# Patient Record
Sex: MALE | Race: WHITE | NOT HISPANIC OR LATINO | Employment: OTHER | URBAN - METROPOLITAN AREA
[De-identification: names, ages, dates, MRNs, and addresses within clinical notes are randomized per-mention and may not be internally consistent; named-entity substitution may affect disease eponyms.]

---

## 2018-08-08 PROBLEM — E87.1 HYPONATREMIA: Status: ACTIVE | Noted: 2018-08-08

## 2018-08-08 PROBLEM — R56.9 OBSERVED SEIZURE-LIKE ACTIVITY: Status: ACTIVE | Noted: 2018-08-08

## 2018-08-08 PROBLEM — G93.89 RIGHT FRONTAL LOBE MASS: Status: ACTIVE | Noted: 2018-08-08

## 2018-08-08 PROBLEM — Z72.0 TOBACCO USE: Status: ACTIVE | Noted: 2018-08-08

## 2018-08-11 PROBLEM — R55 SYNCOPE: Status: ACTIVE | Noted: 2018-08-11

## 2018-08-11 PROBLEM — R56.9 SEIZURE: Status: ACTIVE | Noted: 2018-08-11

## 2018-08-13 PROBLEM — E87.1 HYPONATREMIA: Status: RESOLVED | Noted: 2018-08-08 | Resolved: 2018-08-13

## 2018-08-13 PROBLEM — R55 SYNCOPE: Status: RESOLVED | Noted: 2018-08-11 | Resolved: 2018-08-13

## 2018-08-22 ENCOUNTER — TELEPHONE (OUTPATIENT)
Dept: NEUROSURGERY | Facility: CLINIC | Age: 33
End: 2018-08-22

## 2018-08-22 ENCOUNTER — CLINICAL SUPPORT (OUTPATIENT)
Dept: NEUROSURGERY | Facility: CLINIC | Age: 33
End: 2018-08-22
Payer: MEDICAID

## 2018-08-22 DIAGNOSIS — D49.7 NEOPLASM OF CENTRAL NERVOUS SYSTEM: ICD-10-CM

## 2018-08-22 NOTE — PROGRESS NOTES
Pt is two weeks s/p craniotomy with Dr. Paulson. No s/s of infection. Incision is warm, dry, and intact. Pt reports no post operative pain. Pt is not requesting pain medication refill. Educated patient on weight lifting status, bending/lifting/twisting, and to call with any changes or questions. Pt aware of imaging and follow up appt. with provider. No further questions.

## 2018-08-22 NOTE — TELEPHONE ENCOUNTER
This pt came in for a wound check but he wasn't scheduled for a 4 week follow up or anything. I scheduled him for a 4 week post op with a synaptive MRI. Just wanted to make sure that is all he needs for his follow up appts. Thanks!!

## 2018-09-19 ENCOUNTER — OFFICE VISIT (OUTPATIENT)
Dept: NEUROSURGERY | Facility: CLINIC | Age: 33
End: 2018-09-19
Payer: MEDICAID

## 2018-09-19 VITALS
DIASTOLIC BLOOD PRESSURE: 79 MMHG | BODY MASS INDEX: 21.52 KG/M2 | SYSTOLIC BLOOD PRESSURE: 118 MMHG | WEIGHT: 133.94 LBS | HEART RATE: 66 BPM | HEIGHT: 66 IN

## 2018-09-19 DIAGNOSIS — C71.9 LOW GRADE GLIOMA OF BRAIN: ICD-10-CM

## 2018-09-19 PROCEDURE — 99213 OFFICE O/P EST LOW 20 MIN: CPT | Mod: PBBFAC,PN | Performed by: PHYSICIAN ASSISTANT

## 2018-09-19 PROCEDURE — 99999 PR PBB SHADOW E&M-EST. PATIENT-LVL III: CPT | Mod: PBBFAC,,, | Performed by: PHYSICIAN ASSISTANT

## 2018-09-19 PROCEDURE — 99024 POSTOP FOLLOW-UP VISIT: CPT | Mod: ,,, | Performed by: PHYSICIAN ASSISTANT

## 2018-09-19 NOTE — PROGRESS NOTES
Neurosurgery History & Physical    Patient ID: Дмитрий Winn is a 33 y.o. male.    Chief Complaint   Patient presents with    Post-op Evaluation     4 week post op craniotomy neoplam excision; pt states feels 100% better; no pain       Review of Systems   Constitutional: Negative for chills, diaphoresis, fatigue and fever.   HENT: Negative for congestion, hearing loss, rhinorrhea and sore throat.    Eyes: Negative for photophobia, pain, redness and visual disturbance.   Respiratory: Negative for cough, chest tightness, shortness of breath and wheezing.    Cardiovascular: Negative for chest pain, palpitations and leg swelling.   Gastrointestinal: Negative for abdominal distention, abdominal pain, constipation, diarrhea, nausea and vomiting.   Genitourinary: Negative for difficulty urinating, dysuria, frequency, hematuria and urgency.   Musculoskeletal: Negative for back pain, gait problem, myalgias, neck pain and neck stiffness.   Skin: Negative for pallor and rash.   Neurological: Negative for dizziness, seizures, speech difficulty, weakness, light-headedness, numbness and headaches.   Psychiatric/Behavioral: Negative for confusion, hallucinations and sleep disturbance.       History reviewed. No pertinent past medical history.  Social History     Socioeconomic History    Marital status: Single     Spouse name: Not on file    Number of children: Not on file    Years of education: Not on file    Highest education level: Not on file   Social Needs    Financial resource strain: Not on file    Food insecurity - worry: Not on file    Food insecurity - inability: Not on file    Transportation needs - medical: Not on file    Transportation needs - non-medical: Not on file   Occupational History    Not on file   Tobacco Use    Smoking status: Current Every Day Smoker     Packs/day: 1.00     Years: 8.00     Pack years: 8.00     Types: Cigarettes     Start date: 2010   Substance and Sexual Activity     "Alcohol use: Yes     Alcohol/week: 15.0 oz     Types: 25 Cans of beer per week    Drug use: No    Sexual activity: Not on file   Other Topics Concern    Not on file   Social History Narrative    Not on file     History reviewed. No pertinent family history.  Review of patient's allergies indicates:  No Known Allergies    Current Outpatient Medications:     levETIRAcetam (KEPPRA) 500 MG Tab, Take 1 tablet (500 mg total) by mouth 2 (two) times daily., Disp: 60 tablet, Rfl: 1  No current facility-administered medications for this visit.   Blood pressure 118/79, pulse 66, height 5' 6" (1.676 m), weight 60.7 kg (133 lb 14.9 oz).      Neurologic Exam     Mental Status   Oriented to person, place, and time.   Oriented to person.   Oriented to place.   Oriented to time.   Follows 3 step commands.   Attention: normal. Concentration: normal.   Speech: speech is normal   Level of consciousness: alert  Knowledge: consistent with education.   Able to name object. Able to read. Able to repeat. Able to write. Normal comprehension.      Cranial Nerves      CN II   Visual acuity: normal  Right visual field deficit: none  Left visual field deficit: none      CN III, IV, VI   Pupils are equal, round, and reactive to light.  Right pupil: Size: 3 mm. Shape: regular. Reactivity: brisk. Consensual response: intact.   Left pupil: Size: 3 mm. Shape: regular. Reactivity: brisk. Consensual response: intact.   CN III: no CN III palsy  CN VI: no CN VI palsy  Nystagmus: none   Diplopia: none  Ophthalmoparesis: none  Conjugate gaze: present     CN V   Right facial sensation deficit: none  Left facial sensation deficit: none     CN VII   Right facial weakness: none  Left facial weakness: none     CN VIII   Hearing: intact     CN IX, X   CN IX normal.   CN X normal.      CN XI   Right sternocleidomastoid strength: normal  Left sternocleidomastoid strength: normal  Right trapezius strength: normal  Left trapezius strength: normal     CN XII "   Fasciculations: absent  Tongue deviation: none     Motor Exam   Muscle bulk: normal  Overall muscle tone: normal  Right arm pronator drift: absent  Left arm pronator drift: absent     Strength   Right deltoid: 5/5  Left deltoid: 5/5  Right biceps: 5/5  Left biceps: 5/5  Right triceps: 5/5  Left triceps: 5/5  Right wrist flexion: 5/5  Left wrist flexion: 5/5  Right wrist extension: 5/5  Left wrist extension: 5/5  Right interossei: 5/5  Left interossei: 5/5  Right iliopsoas: 5/5  Left iliopsoas: 5/5  Right quadriceps: 5/5  Left quadriceps: 5/5  Right hamstrin/5  Left hamstrin/5  Right anterior tibial: 5/5  Left anterior tibial: 5/5  Right posterior tibial: 5/5  Left posterior tibial: 5/5  Right peroneal: 5/5  Left peroneal: 5/5  Right gastroc: 5/5  Left gastroc: 5/5     Sensory Exam   Right arm light touch: normal  Left arm light touch: normal  Right leg light touch: normal  Left leg light touch: normal     Gait, Coordination, and Reflexes      Gait  Gait: normal      Coordination   Romberg: negative  Finger to nose coordination: normal  Heel to shin coordination: normal  Tandem walking coordination: normal     Tremor   Resting tremor: absent  Intention tremor: absent  Action tremor: absent     Reflexes   Right brachioradialis: 2+  Left brachioradialis: 2+  Right biceps: 2+  Left biceps: 2+  Right triceps: 2+  Left triceps: 2+  Right patellar: 2+  Left patellar: 2+  Right achilles: 2+  Left achilles: 2+  Right Freeman: absent  Left Freeman: absent  Right ankle clonus: absent  Left ankle clonus: absent      Physical Exam  Constitutional: Oriented to person, place, and time. Appears well-developed and well-nourished.   HENT:   Head: Normocephalic and atraumatic.   Eyes: Pupils are equal, round, and reactive to light.   Neck: Normal range of motion. Neck supple.   Cardiovascular: Normal rate.    Pulmonary/Chest: Effort normal.   Musculoskeletal: Normal range of motion. Exhibits no edema.   Neurological: Alert  and oriented to person, place, and time. Normal Finger-Nose-Finger Test, a normal Heel to Shin Test, a normal Romberg Test and a normal Tandem Gait Test. Gait normal.   Reflex Scores:       Tricep reflexes are 2+ on the right side and 2+ on the left side.       Bicep reflexes are 2+ on the right side and 2+ on the left side.       Brachioradialis reflexes are 2+ on the right side and 2+ on the left side.       Patellar reflexes are 2+ on the right side and 2+ on the left side.       Achilles reflexes are 2+ on the right side and 2+ on the left side.  Skin: Skin is warm, dry and intact.   Psychiatric: Normal mood and affect. Speech is normal and behavior is normal. Judgment and thought content normal.   Nursing note and vitals reviewed.      Provider dictation:    Дмитрий Wnin is a 33 year-old right-handed  male with normal health and development who presented to the ED via EMS following a seizure. Neuroimaging showed a large frontal mass with an unusual appearance suspicious for low grade glioma or neuroglial cyst. He was admitted for craniotomy and excisional biopsy during that hospitalization. He presents today for neurosurgical follow-up s/p right supraorbital frontal craniotomy and resection of gyrus rectus tumor on 8/10/2018. Patient denies any seizures since presentation and is currently maintained on Levetiracetam 500 mg BID. He denies any headaches, weakness, or vision changes.     On exam patient is neurologically intact. Incision is well healed.     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    MRI brain synaptive personally reviewed and shows no enhancement or tumor recurrence.     Дмитрий Winn is recovering well from surgery. Patient is currently uninsured and medicaid pending. We will refer him to [facility] oncology clinic for further evaluation and treatment. Patient will not require immediate  chemo/radiation, but we need him to establish care with oncology. Since patient has been seizure free he is clear to stop the Levetiracetam at this point and drive. He is also clear to resume normal activity and return to work from our stand point. Patient and imaging reviewed with Dr. Paulson. Patient was informed to call the clinic with any further questions or concerns.     1. Low grade glioma of brain  Ambulatory consult to Hematology / Oncology

## 2018-09-25 ENCOUNTER — TELEPHONE (OUTPATIENT)
Dept: NEUROLOGY | Facility: HOSPITAL | Age: 33
End: 2018-09-25

## 2018-09-25 NOTE — TELEPHONE ENCOUNTER
----- Message from Samantha Mo sent at 9/21/2018  3:27 PM CDT -----  Regarding: NEW  Contact: [phone]  NEW-  - Who called: Referral faxed from Jennifer Cabrera    Referred by: Dr Moreno    Why do you need to be seen?  Low grade glioma of brain    Which doctor are you requesting?     You may contact patient back to schedule at: [phone]

## 2018-09-25 NOTE — TELEPHONE ENCOUNTER
msg received, pt needs neuro oncology at [facility] or [facility]. Sent msg to Dr. Lynch office informing them of this.

## 2018-09-26 ENCOUNTER — TELEPHONE (OUTPATIENT)
Dept: NEUROSURGERY | Facility: CLINIC | Age: 33
End: 2018-09-26

## 2018-09-26 NOTE — TELEPHONE ENCOUNTER
----- Message from Cielo Betancourt RN sent at 9/25/2018  4:49 PM CDT -----  We received a call referring this patient to the neuroendcrine tumor clinic at [facility].  He would have to be referred to oncology at [facility] or [facility]. We do not see this type of patient in our clinic.

## 2018-11-01 ENCOUNTER — INITIAL CONSULT (OUTPATIENT)
Dept: HEMATOLOGY/ONCOLOGY | Facility: CLINIC | Age: 33
End: 2018-11-01
Payer: MEDICAID

## 2018-11-01 VITALS
TEMPERATURE: 98 F | BODY MASS INDEX: 22 KG/M2 | WEIGHT: 136.88 LBS | HEIGHT: 66 IN | SYSTOLIC BLOOD PRESSURE: 124 MMHG | HEART RATE: 56 BPM | DIASTOLIC BLOOD PRESSURE: 74 MMHG

## 2018-11-01 DIAGNOSIS — C71.9 ASTROCYTOMA BRAIN TUMOR: Primary | ICD-10-CM

## 2018-11-01 DIAGNOSIS — G93.89 RIGHT FRONTAL LOBE MASS: Primary | ICD-10-CM

## 2018-11-01 DIAGNOSIS — R56.9 SEIZURE: ICD-10-CM

## 2018-11-01 PROCEDURE — 99205 OFFICE O/P NEW HI 60 MIN: CPT | Mod: S$PBB,,, | Performed by: INTERNAL MEDICINE

## 2018-11-01 PROCEDURE — 99999 PR PBB SHADOW E&M-EST. PATIENT-LVL III: CPT | Mod: PBBFAC,,, | Performed by: INTERNAL MEDICINE

## 2018-11-01 PROCEDURE — 99213 OFFICE O/P EST LOW 20 MIN: CPT | Mod: PBBFAC | Performed by: INTERNAL MEDICINE

## 2018-11-01 NOTE — PROGRESS NOTES
Hematology and Medical Oncology   New Patient Consult     11/01/2018  Referred by:  Shoshana Moreno    Primary Oncologic Diagnosis: Grade II Glioma     History of Present Ilness:   Дмитрий Winn (Дмитрий) is a pleasant 33 y.o.male who presents today to discuss long term follow up plans for his low grade glioma. He is doing well 3 months post op with no side effects, complications or deficits.      Oncology History:  --On August 8, 2018 he presented to [facility].  presented to the ED via EMS following a seizure. Neuroimaging showed a large frontal mass with an unusual appearance suspicious for low grade glioma or neuroglial cyst.   --CT on 8/8/18 INTRACRANIAL: 4.8 x 3.5 x 3.6 cm  (AP x TR x CC) relatively homogeneous hypoattenuating air lesion favored to be intra-axial within the inferior right frontal lobe.  Though there does appear to be surface which is partially abuts the floor of the anterior cranial fossa.  Trace leftward anterior midline bowing.  Minimal mass effect on the anterior horn of the right lateral ventricle.  Basal cisterns are patent.  No acute intracranial hemorrhage.  No hydrocephalus.  --MRI on 8/8/18 Diffusion-weighted imaging is negative for acute or subacute ischemia.  There is right frontal lobe lesion appears likely intraparenchymal less likely dural arising from the floor of the anterior cranial fossa.  This measures 4.6 x 3.5 cm transverse by 3.8 cm craniocaudad.  This is T1 hypointense, T2 hyperintense with some mild heterogeneity within.  No enhancement is appreciated.  There is mild associated vasogenic edema.  There is mild right to left midline shift anterior falcine level of approximately 3 mm.  -- right supraorbital frontal craniotomy and resection of gyrus rectus tumor on 8/10/2018. 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   Expected postoperative changes.  Signal along the inferior margin of the resection cavity in the gyrus rectus and orbital gyrus is similar to preoperative study.        PAST MEDICAL HISTORY:   History reviewed. No pertinent past medical history.    PAST SURGICAL HISTORY:   Past Surgical History:   Procedure Laterality Date    CRANIOTOMY FOR EXCISION OF INTRACRANIAL TUMOR Right 8/10/2018    Procedure: CRANIOTOMY, FOR INTRACRANIAL NEOPLASM EXCISION Right eyebrow incision / LAY[facility] [employer] craniotomy for tumor resection;  Surgeon: Lefty Paulson MD;  Location: [facility];  Service: Neurosurgery;  Laterality: Right;    CRANIOTOMY, FOR INTRACRANIAL NEOPLASM EXCISION Right eyebrow incision / LAYNE [employer] craniotomy for tumor resection Right 8/10/2018    Performed by Lefty [facility]son MD at [facility] OR       PAST SOCIAL HISTORY:   reports that he has been smoking cigarettes.  He started smoking about 8 years ago. He has a 8.00 pack-year smoking history. He does not have any smokeless tobacco history on file. He reports that he drinks about 15.0 oz of alcohol per week. He reports that he does not use drugs.    FAMILY HISTORY:  Family History   Problem Relation Age of Onset    Cancer Maternal Grandfather     Prostate cancer Maternal Grandfather     Cancer Paternal Grandmother     Breast cancer Paternal Grandmother        CURRENT MEDICATIONS:   No current outpatient medications on file.     No current facility-administered medications for this visit.      ALLERGIES:   Review of patient's allergies indicates:  No Known Allergies      Review of Systems:     Review of Systems   Constitutional: Negative for appetite change, chills, diaphoresis, fatigue, fever and unexpected weight change.   HENT:   Negative for hearing loss, mouth sores, nosebleeds, sore throat, trouble swallowing and voice change.    Eyes: Negative for eye problems and icterus.   Respiratory: Negative for chest tightness, cough, hemoptysis, shortness of  breath and wheezing.    Cardiovascular: Negative for chest pain, leg swelling and palpitations.   Gastrointestinal: Negative for abdominal distention, abdominal pain, blood in stool, diarrhea, nausea and vomiting.   Endocrine: Negative for hot flashes.   Genitourinary: Negative for bladder incontinence, difficulty urinating, dysuria and hematuria.    Musculoskeletal: Negative for arthralgias, back pain, flank pain, gait problem, myalgias, neck pain and neck stiffness.   Skin: Negative for itching, rash and wound.   Neurological: Negative for dizziness, extremity weakness, gait problem, headaches, numbness, seizures and speech difficulty.   Hematological: Negative for adenopathy. Does not bruise/bleed easily.   Psychiatric/Behavioral: Negative for confusion, depression and sleep disturbance. The patient is not nervous/anxious.           Physical Exam:     Vitals:    11/01/18 0852   BP: 124/74   Pulse: (!) 56   Temp: 97.7 °F (36.5 °C)     Physical Exam   Constitutional: He is oriented to person, place, and time. He appears well-developed and well-nourished. No distress.   HENT:   Head: Normocephalic and atraumatic.   Mouth/Throat: Oropharynx is clear and moist. No oropharyngeal exudate.   Well healed right forehead incision    Eyes: Conjunctivae and EOM are normal. Pupils are equal, round, and reactive to light.   Neck: Normal range of motion. Neck supple. No JVD present. No tracheal deviation present. No thyromegaly present.   Cardiovascular: Normal rate, regular rhythm and normal heart sounds. Exam reveals no friction rub.   No murmur heard.  Pulmonary/Chest: Effort normal and breath sounds normal. No stridor. No respiratory distress. He has no wheezes. He has no rales. He exhibits no tenderness.   Abdominal: Soft. Bowel sounds are normal. He exhibits no distension. There is no tenderness. There is no rebound and no guarding.   Musculoskeletal: Normal range of motion. He exhibits no edema, tenderness or deformity.    Lymphadenopathy:     He has no axillary adenopathy.   Neurological: He is alert and oriented to person, place, and time. He displays normal reflexes. No cranial nerve deficit or sensory deficit. He exhibits normal muscle tone. Coordination normal.   Skin: Skin is warm and dry. Capillary refill takes less than 2 seconds. No rash noted. He is not diaphoretic. No erythema. No pallor.   Psychiatric: He has a normal mood and affect. His behavior is normal. Judgment and thought content normal.       ECOG Performance Status: (foot note - ECOG PS provided by Eastern Cooperative Oncology Group) 0 - Asymptomatic    Karnofsky Performance Score:  100%- Normal, No Complaints, No Evidence of Disease    Labs:   Lab Results   Component Value Date    WBC 20.02 (H) 08/11/2018    HGB 13.5 (L) 08/11/2018    HCT 40.2 08/11/2018     08/11/2018    ALT 33 08/08/2018    AST 27 08/08/2018     08/11/2018    K 3.9 08/11/2018     08/11/2018    CREATININE 0.61 08/11/2018    BUN 11 08/11/2018    CO2 23 08/11/2018    INR 1.0 08/09/2018         Imaging:Previous imaging has been reviewed   Assessment and Plan:     Mr. Winn is a 33 year old male who presents today for surveillance planning of his Grade II Glioma.      Astrocytoma  --WHO Grade II that was resected on 8/10/18  --Will do q3-6 month brain MRI's as active surveillance, the next scan is planned for January 2019  --No current indication for therapy        60 minutes were spent face to face with the patient and his  family to discuss the disease, natural history, treatment options and survival statistics. I have provided the patient with an opportunity to ask questions and have all questions answered to his satisfaction.       he will return to clinic in 2 months, but knows to call in the interim if symptoms change or should a problem arise.        Dasha Coronado MD  Hematology and Medical Oncology  Bone Marrow Transplant  [facility]

## 2018-11-01 NOTE — LETTER
November 1, 2018      Shoshana Moreno PA-C  [address] [facility] [address] [place] [address]           [facility] Hematology Oncology  [address]  [place] [address]  Phone: [phone]          Patient: Дмитрий Winn   MR Number: [id number]   YOB: 1985   Date of Visit: 11/1/2018       Dear Shoshana Moreno:    Thank you for referring Дмитрий Winn to me for evaluation. Attached you will find relevant portions of my assessment and plan of care.    If you have questions, please do not hesitate to call me. I look forward to following Дмитрий Winn along with you.    Sincerely,    Dasha Coronado MD    Enclosure  CC:  No Recipients    If you would like to receive this communication electronically, please contact [email or web address] or [phone] to request more information on [employer] access.    For providers and/or their staff who would like to refer a patient to [facility], please contact us through our one-stop-shop provider referral line, [facility] , at [phone].    If you feel you have received this communication in error or would no longer like to receive these types of communications, please e-mail [email or web address]

## 2019-01-11 ENCOUNTER — HOSPITAL ENCOUNTER (OUTPATIENT)
Dept: RADIOLOGY | Facility: HOSPITAL | Age: 34
Discharge: HOME | End: 2019-01-11
Attending: INTERNAL MEDICINE
Payer: MEDICAID

## 2019-01-11 DIAGNOSIS — C71.9 ASTROCYTOMA BRAIN TUMOR: ICD-10-CM

## 2019-01-11 PROCEDURE — 70553 MRI BRAIN W WO CONTRAST: ICD-10-PCS | Mod: 26,,, | Performed by: RADIOLOGY

## 2019-01-11 PROCEDURE — 25500020 PHARM REV CODE 255: Performed by: INTERNAL MEDICINE

## 2019-01-11 PROCEDURE — 70553 MRI BRAIN STEM W/O & W/DYE: CPT | Mod: TC

## 2019-01-11 PROCEDURE — A9585 GADOBUTROL INJECTION: HCPCS | Performed by: INTERNAL MEDICINE

## 2019-01-11 PROCEDURE — 70553 MRI BRAIN STEM W/O & W/DYE: CPT | Mod: 26,,, | Performed by: RADIOLOGY

## 2019-01-11 RX ORDER — GADOBUTROL 604.72 MG/ML
7 INJECTION INTRAVENOUS
Status: COMPLETED | OUTPATIENT
Start: 2019-01-11 | End: 2019-01-11

## 2019-01-11 RX ADMIN — GADOBUTROL 7 ML: 604.72 INJECTION INTRAVENOUS at 02:01

## 2019-01-14 ENCOUNTER — OFFICE VISIT (OUTPATIENT)
Dept: HEMATOLOGY/ONCOLOGY | Facility: CLINIC | Age: 34
End: 2019-01-14
Payer: MEDICAID

## 2019-01-14 VITALS
WEIGHT: 139.13 LBS | HEIGHT: 66 IN | TEMPERATURE: 98 F | DIASTOLIC BLOOD PRESSURE: 81 MMHG | BODY MASS INDEX: 22.36 KG/M2 | SYSTOLIC BLOOD PRESSURE: 135 MMHG | HEART RATE: 56 BPM | RESPIRATION RATE: 18 BRPM

## 2019-01-14 DIAGNOSIS — C71.9 ASTROCYTOMA BRAIN TUMOR: Primary | ICD-10-CM

## 2019-01-14 PROCEDURE — 99215 OFFICE O/P EST HI 40 MIN: CPT | Mod: S$PBB,,, | Performed by: INTERNAL MEDICINE

## 2019-01-14 PROCEDURE — 99215 PR OFFICE/OUTPT VISIT, EST, LEVL V, 40-54 MIN: ICD-10-PCS | Mod: S$PBB,,, | Performed by: INTERNAL MEDICINE

## 2019-01-14 PROCEDURE — 99213 OFFICE O/P EST LOW 20 MIN: CPT | Mod: PBBFAC | Performed by: INTERNAL MEDICINE

## 2019-01-14 PROCEDURE — 99999 PR PBB SHADOW E&M-EST. PATIENT-LVL III: CPT | Mod: PBBFAC,,, | Performed by: INTERNAL MEDICINE

## 2019-01-14 PROCEDURE — 99999 PR PBB SHADOW E&M-EST. PATIENT-LVL III: ICD-10-PCS | Mod: PBBFAC,,, | Performed by: INTERNAL MEDICINE

## 2019-01-14 NOTE — PROGRESS NOTES
Hematology and Medical Oncology   Follow Up     01/14/2019    Primary Oncologic Diagnosis: Grade II Glioma     History of Present Ilness:   Дмитрий Winn (Дмитрий) is a pleasant 33 y.o.male who presents today to discuss long term follow up plans for his low grade glioma. He is doing well 6 months post op with no side effects, complications or deficits.      Oncology History:  --On August 8, 2018 he presented to [facility].  presented to the ED via EMS following a seizure. Neuroimaging showed a large frontal mass with an unusual appearance suspicious for low grade glioma or neuroglial cyst.   --CT on 8/8/18 INTRACRANIAL: 4.8 x 3.5 x 3.6 cm  (AP x TR x CC) relatively homogeneous hypoattenuating air lesion favored to be intra-axial within the inferior right frontal lobe.  Though there does appear to be surface which is partially abuts the floor of the anterior cranial fossa.  Trace leftward anterior midline bowing.  Minimal mass effect on the anterior horn of the right lateral ventricle.  Basal cisterns are patent.  No acute intracranial hemorrhage.  No hydrocephalus.  --MRI on 8/8/18 Diffusion-weighted imaging is negative for acute or subacute ischemia.  There is right frontal lobe lesion appears likely intraparenchymal less likely dural arising from the floor of the anterior cranial fossa.  This measures 4.6 x 3.5 cm transverse by 3.8 cm craniocaudad.  This is T1 hypointense, T2 hyperintense with some mild heterogeneity within.  No enhancement is appreciated.  There is mild associated vasogenic edema.  There is mild right to left midline shift anterior falcine level of approximately 3 mm.  -- right supraorbital frontal craniotomy and resection of gyrus rectus tumor on 8/10/2018. 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  Expected postoperative changes.  Signal along  the inferior margin of the resection cavity in the gyrus rectus and orbital gyrus is similar to preoperative study.  --MRI on 1/11/19 Lesion best viewed on the this measures 3.7 x 2.6 cm in maximal transverse dimension on today's study and over 2.6 cm in craniocaudad extent (compared to 3.5 x 2.2 x 2.0 cm respectively on 09/19/2018).        Interval History:  Mr. Winn is doing exceptionally well. He denies all issues. 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     PAST MEDICAL HISTORY:   No past medical history on file.    PAST SURGICAL HISTORY:   Past Surgical History:   Procedure Laterality Date    CRANIOTOMY, FOR INTRACRANIAL NEOPLASM EXCISION Right eyebrow incision / LAY[facility] craniotomy for tumor resection Right 8/10/2018    Performed by Lefty Paulson MD at [facility] OR       PAST SOCIAL HISTORY:   reports that he has been smoking cigarettes.  He started smoking about 9 years ago. He has a 8.00 pack-year smoking history. He does not have any smokeless tobacco history on file. He reports that he drinks about 15.0 oz of alcohol per week. He reports that he does not use drugs.    FAMILY HISTORY:  Family History   Problem Relation Age of Onset    Cancer Maternal Grandfather     Prostate cancer Maternal Grandfather     Cancer Paternal Grandmother     Breast cancer Paternal Grandmother        CURRENT MEDICATIONS:   No current outpatient medications on file.     No current facility-administered medications for this visit.      ALLERGIES:   Review of patient's allergies indicates:  No Known Allergies      Review of Systems:     Review of Systems   Constitutional: Negative for appetite change, chills, diaphoresis, fatigue, fever and unexpected weight change.   HENT:   Negative for hearing loss, mouth sores, nosebleeds, sore throat, trouble swallowing and voice change.    Eyes: Negative for eye problems and icterus.   Respiratory: Negative for chest tightness, cough, hemoptysis,  shortness of breath and wheezing.    Cardiovascular: Negative for chest pain, leg swelling and palpitations.   Gastrointestinal: Negative for abdominal distention, abdominal pain, blood in stool, diarrhea, nausea and vomiting.   Endocrine: Negative for hot flashes.   Genitourinary: Negative for bladder incontinence, difficulty urinating, dysuria and hematuria.    Musculoskeletal: Negative for arthralgias, back pain, flank pain, gait problem, myalgias, neck pain and neck stiffness.   Skin: Negative for itching, rash and wound.   Neurological: Negative for dizziness, extremity weakness, gait problem, headaches, numbness, seizures and speech difficulty.   Hematological: Negative for adenopathy. Does not bruise/bleed easily.   Psychiatric/Behavioral: Negative for confusion, depression and sleep disturbance. The patient is not nervous/anxious.           Physical Exam:     Vitals:    01/14/19 1629   BP: 135/81   Pulse: (!) 56   Resp: 18   Temp: 98.1 °F (36.7 °C)       Physical Exam   Constitutional: He is oriented to person, place, and time. He appears well-developed and well-nourished. No distress.   HENT:   Head: Normocephalic and atraumatic.   Mouth/Throat: Oropharynx is clear and moist. No oropharyngeal exudate.   Well healed right forehead incision    Eyes: Conjunctivae and EOM are normal. Pupils are equal, round, and reactive to light.   Neck: Normal range of motion. Neck supple. No JVD present. No tracheal deviation present. No thyromegaly present.   Cardiovascular: Normal rate, regular rhythm and normal heart sounds. Exam reveals no friction rub.   No murmur heard.  Pulmonary/Chest: Effort normal and breath sounds normal. No stridor. No respiratory distress. He has no wheezes. He has no rales. He exhibits no tenderness.   Abdominal: Soft. Bowel sounds are normal. He exhibits no distension. There is no tenderness. There is no rebound and no guarding.   Musculoskeletal: Normal range of motion. He exhibits no edema,  tenderness or deformity.   Lymphadenopathy:     He has no axillary adenopathy.   Neurological: He is alert and oriented to person, place, and time. He displays normal reflexes. No cranial nerve deficit or sensory deficit. He exhibits normal muscle tone. Coordination normal.   Skin: Skin is warm and dry. Capillary refill takes less than 2 seconds. No rash noted. He is not diaphoretic. No erythema. No pallor.   Psychiatric: He has a normal mood and affect. His behavior is normal. Judgment and thought content normal.       ECOG Performance Status: (foot note - ECOG PS provided by Eastern Cooperative Oncology Group) 0 - Asymptomatic    Karnofsky Performance Score:  100%- Normal, No Complaints, No Evidence of Disease    Labs:   Lab Results   Component Value Date    WBC 9.12 01/11/2019    HGB 14.6 01/11/2019    HCT 44.0 01/11/2019     01/11/2019    ALT 21 01/11/2019    AST 25 01/11/2019     (L) 01/11/2019    K 4.2 01/11/2019    CL 98 01/11/2019    CREATININE 0.8 01/11/2019    BUN 11 01/11/2019    CO2 27 01/11/2019    INR 1.0 08/09/2018         Imaging:Previous imaging has been reviewed   Assessment and Plan:     Mr. Winn is a 33 year old male who presents today for surveillance planning of his Grade II Glioma.      Astrocytoma  --WHO Grade II that was resected on 8/10/18  --Will do q3-6 month brain MRI's as active surveillance, the next scan is planned for January 2019  --Most recent MRI reveals a 0.2cm increase in tumor size, but no change in the T2 flair. After discussion with radiation oncology we are jeffrey  --No current indication for therapy        30 minutes were spent face to face with the patient and his  family to discuss the disease, natural history, treatment options and survival statistics. I have provided the patient with an opportunity to ask questions and have all questions answered to his satisfaction.       he will return to clinic in 3 months, but knows to call in the interim if symptoms  change or should a problem arise.        Dasha Coronado MD  Hematology and Medical Oncology  Bone Marrow Transplant  [facility]

## 2019-04-29 ENCOUNTER — HOSPITAL ENCOUNTER (OUTPATIENT)
Dept: RADIOLOGY | Facility: HOSPITAL | Age: 34
Discharge: HOME | End: 2019-04-29
Attending: INTERNAL MEDICINE
Payer: MEDICAID

## 2019-04-29 DIAGNOSIS — C71.9 ASTROCYTOMA BRAIN TUMOR: ICD-10-CM

## 2019-04-29 PROCEDURE — 70553 MRI BRAIN W WO CONTRAST: ICD-10-PCS | Mod: 26,,, | Performed by: RADIOLOGY

## 2019-04-29 PROCEDURE — 70553 MRI BRAIN STEM W/O & W/DYE: CPT | Mod: 26,,, | Performed by: RADIOLOGY

## 2019-04-29 PROCEDURE — A9585 GADOBUTROL INJECTION: HCPCS | Mod: PO | Performed by: INTERNAL MEDICINE

## 2019-04-29 PROCEDURE — 70553 MRI BRAIN STEM W/O & W/DYE: CPT | Mod: TC,PO

## 2019-04-29 PROCEDURE — 25500020 PHARM REV CODE 255: Mod: PO | Performed by: INTERNAL MEDICINE

## 2019-04-29 RX ORDER — GADOBUTROL 604.72 MG/ML
6 INJECTION INTRAVENOUS
Status: COMPLETED | OUTPATIENT
Start: 2019-04-29 | End: 2019-04-29

## 2019-04-29 RX ADMIN — GADOBUTROL 6 ML: 604.72 INJECTION INTRAVENOUS at 11:04

## 2019-04-30 ENCOUNTER — OFFICE VISIT (OUTPATIENT)
Dept: HEMATOLOGY/ONCOLOGY | Facility: CLINIC | Age: 34
End: 2019-04-30
Payer: MEDICAID

## 2019-04-30 VITALS
WEIGHT: 145.5 LBS | HEART RATE: 63 BPM | BODY MASS INDEX: 23.38 KG/M2 | TEMPERATURE: 98 F | HEIGHT: 66 IN | SYSTOLIC BLOOD PRESSURE: 119 MMHG | DIASTOLIC BLOOD PRESSURE: 82 MMHG

## 2019-04-30 DIAGNOSIS — Z72.0 TOBACCO USE: ICD-10-CM

## 2019-04-30 DIAGNOSIS — C71.9 ASTROCYTOMA BRAIN TUMOR: Primary | ICD-10-CM

## 2019-04-30 PROCEDURE — 99215 PR OFFICE/OUTPT VISIT, EST, LEVL V, 40-54 MIN: ICD-10-PCS | Mod: S$PBB,,, | Performed by: INTERNAL MEDICINE

## 2019-04-30 PROCEDURE — 99213 OFFICE O/P EST LOW 20 MIN: CPT | Mod: PBBFAC | Performed by: INTERNAL MEDICINE

## 2019-04-30 PROCEDURE — 99215 OFFICE O/P EST HI 40 MIN: CPT | Mod: S$PBB,,, | Performed by: INTERNAL MEDICINE

## 2019-04-30 PROCEDURE — 99999 PR PBB SHADOW E&M-EST. PATIENT-LVL III: CPT | Mod: PBBFAC,,, | Performed by: INTERNAL MEDICINE

## 2019-04-30 PROCEDURE — 99999 PR PBB SHADOW E&M-EST. PATIENT-LVL III: ICD-10-PCS | Mod: PBBFAC,,, | Performed by: INTERNAL MEDICINE

## 2019-04-30 NOTE — PROGRESS NOTES
Hematology and Medical Oncology   Follow Up     04/30/2019    Primary Oncologic Diagnosis: Grade II Glioma     History of Present Ilness:   Дмитрий Winn (Дмитрий) is a pleasant 33 y.o.male who presents today to discuss long term follow up plans for his low grade glioma. He is doing well 6 months post op with no side effects, complications or deficits.      Oncology History:  --On August 8, 2018 he presented to [facility].  presented to the ED via EMS following a seizure. Neuroimaging showed a large frontal mass with an unusual appearance suspicious for low grade glioma or neuroglial cyst.   --CT on 8/8/18 INTRACRANIAL: 4.8 x 3.5 x 3.6 cm  (AP x TR x CC) relatively homogeneous hypoattenuating air lesion favored to be intra-axial within the inferior right frontal lobe.  Though there does appear to be surface which is partially abuts the floor of the anterior cranial fossa.  Trace leftward anterior midline bowing.  Minimal mass effect on the anterior horn of the right lateral ventricle.  Basal cisterns are patent.  No acute intracranial hemorrhage.  No hydrocephalus.  --MRI on 8/8/18 Diffusion-weighted imaging is negative for acute or subacute ischemia.  There is right frontal lobe lesion appears likely intraparenchymal less likely dural arising from the floor of the anterior cranial fossa.  This measures 4.6 x 3.5 cm transverse by 3.8 cm craniocaudad.  This is T1 hypointense, T2 hyperintense with some mild heterogeneity within.  No enhancement is appreciated.  There is mild associated vasogenic edema.  There is mild right to left midline shift anterior falcine level of approximately 3 mm.  -- right supraorbital frontal craniotomy and resection of gyrus rectus tumor on 8/10/2018. 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  Expected postoperative changes.  Signal along  the inferior margin of the resection cavity in the gyrus rectus and orbital gyrus is similar to preoperative study.  --MRI on 1/11/19 Lesion best viewed on the this measures 3.7 x 2.6 cm in maximal transverse dimension on today's study and over 2.6 cm in craniocaudad extent (compared to 3.5 x 2.2 x 2.0 cm respectively on 09/19/2018)   --MRI on 4/29/19 The residual T2 hyperintense lesion in the inferior right frontal lobe measures approximately 35 x 26 x 26 mm    Interval History:  Mr. Winn is doing exceptionally well. He denies all issues. 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  He has recently adjusted his diet to include cottage cheese and flax seeds.    PAST MEDICAL HISTORY:   No past medical history on file.    PAST SURGICAL HISTORY:   Past Surgical History:   Procedure Laterality Date    CRANIOTOMY, FOR INTRACRANIAL NEOPLASM EXCISION Right eyebrow incision / [facility] craniotomy for tumor resection Right 8/10/2018    Performed by Lefty Paulson MD at [facility] OR       PAST SOCIAL HISTORY:   reports that he quit smoking about 8 months ago. His smoking use included cigarettes. He started smoking about 9 years ago. He has a 8.00 pack-year smoking history. He does not have any smokeless tobacco history on file. He reports that he drinks about 15.0 oz of alcohol per week. He reports that he does not use drugs.    FAMILY HISTORY:  Family History   Problem Relation Age of Onset    Cancer Maternal Grandfather     Prostate cancer Maternal Grandfather     Cancer Paternal Grandmother     Breast cancer Paternal Grandmother        CURRENT MEDICATIONS:   No current outpatient medications on file.     No current facility-administered medications for this visit.      ALLERGIES:   Review of patient's allergies indicates:  No Known Allergies      Review of Systems:     Review of Systems   Constitutional: Negative for appetite change, chills, diaphoresis, fatigue, fever and unexpected  weight change.   HENT:   Negative for hearing loss, mouth sores, nosebleeds, sore throat, trouble swallowing and voice change.    Eyes: Negative for eye problems and icterus.   Respiratory: Negative for chest tightness, cough, hemoptysis, shortness of breath and wheezing.    Cardiovascular: Negative for chest pain, leg swelling and palpitations.   Gastrointestinal: Negative for abdominal distention, abdominal pain, blood in stool, diarrhea, nausea and vomiting.   Endocrine: Negative for hot flashes.   Genitourinary: Negative for bladder incontinence, difficulty urinating, dysuria and hematuria.    Musculoskeletal: Negative for arthralgias, back pain, flank pain, gait problem, myalgias, neck pain and neck stiffness.   Skin: Negative for itching, rash and wound.   Neurological: Negative for dizziness, extremity weakness, gait problem, headaches, numbness, seizures and speech difficulty.   Hematological: Negative for adenopathy. Does not bruise/bleed easily.   Psychiatric/Behavioral: Negative for confusion, depression and sleep disturbance. The patient is not nervous/anxious.           Physical Exam:     Vitals:    04/30/19 1059   BP: 119/82   Pulse: 63   Temp: 97.6 °F (36.4 °C)       Physical Exam   Constitutional: He is oriented to person, place, and time. He appears well-developed and well-nourished. No distress.   HENT:   Head: Normocephalic and atraumatic.   Mouth/Throat: Oropharynx is clear and moist. No oropharyngeal exudate.   Well healed right forehead incision    Eyes: Pupils are equal, round, and reactive to light. Conjunctivae and EOM are normal.   Neck: Normal range of motion. Neck supple. No JVD present. No tracheal deviation present. No thyromegaly present.   Cardiovascular: Normal rate, regular rhythm and normal heart sounds. Exam reveals no friction rub.   No murmur heard.  Pulmonary/Chest: Effort normal and breath sounds normal. No stridor. No respiratory distress. He has no wheezes. He has no rales.  He exhibits no tenderness.   Abdominal: Soft. Bowel sounds are normal. He exhibits no distension. There is no tenderness. There is no rebound and no guarding.   Musculoskeletal: Normal range of motion. He exhibits no edema, tenderness or deformity.   Lymphadenopathy:     He has no axillary adenopathy.   Neurological: He is alert and oriented to person, place, and time. He displays normal reflexes. No cranial nerve deficit or sensory deficit. He exhibits normal muscle tone. Coordination normal.   Skin: Skin is warm and dry. Capillary refill takes less than 2 seconds. No rash noted. He is not diaphoretic. No erythema. No pallor.   Psychiatric: He has a normal mood and affect. His behavior is normal. Judgment and thought content normal.       ECOG Performance Status: (foot note - ECOG PS provided by Eastern Cooperative Oncology Group) 0 - Asymptomatic    Karnofsky Performance Score:  100%- Normal, No Complaints, No Evidence of Disease    Labs:   Lab Results   Component Value Date    WBC 9.12 01/11/2019    HGB 14.6 01/11/2019    HCT 44.0 01/11/2019     01/11/2019    ALT 21 01/11/2019    AST 25 01/11/2019     (L) 01/11/2019    K 4.2 01/11/2019    CL 98 01/11/2019    CREATININE 0.8 01/11/2019    BUN 11 01/11/2019    CO2 27 01/11/2019    INR 1.0 08/09/2018         Imaging:Previous imaging has been reviewed   Assessment and Plan:     Mr. Winn is a 33 year old male who presents today for surveillance planning of his Grade II Glioma.      Astrocytoma  --WHO Grade II that was resected on 8/10/18  --Will do q3 month brain MRI's as active surveillance, the next scan is planned for August 2019  --Most recent MRI reveals a 0.2cm increase in tumor size, but no change in the T2 flair. After discussion with radiation oncology we are jeffrey  --No current indication for therapy        30 minutes were spent face to face with the patient and his  family to discuss the disease, natural history, treatment options and survival  statistics. I have provided the patient with an opportunity to ask questions and have all questions answered to his satisfaction.       he will return to clinic in 3 months, but knows to call in the interim if symptoms change or should a problem arise.        Dasha Coronado MD  Hematology and Medical Oncology  Bone Marrow Transplant  [facility]

## 2019-08-02 ENCOUNTER — TELEPHONE (OUTPATIENT)
Dept: HEMATOLOGY/ONCOLOGY | Facility: CLINIC | Age: 34
End: 2019-08-02

## 2019-08-02 ENCOUNTER — HOSPITAL ENCOUNTER (OUTPATIENT)
Dept: RADIOLOGY | Facility: HOSPITAL | Age: 34
Discharge: HOME | End: 2019-08-02
Attending: INTERNAL MEDICINE
Payer: MEDICAID

## 2019-08-02 DIAGNOSIS — C71.9 ASTROCYTOMA BRAIN TUMOR: ICD-10-CM

## 2019-08-02 DIAGNOSIS — C71.9 ASTROCYTOMA BRAIN TUMOR: Primary | ICD-10-CM

## 2019-08-02 PROCEDURE — 70553 MRI BRAIN STEM W/O & W/DYE: CPT | Mod: TC,PO

## 2019-08-02 PROCEDURE — 70553 MRI BRAIN STEM W/O & W/DYE: CPT | Mod: 26,,, | Performed by: RADIOLOGY

## 2019-08-02 PROCEDURE — 25500020 PHARM REV CODE 255: Mod: PO | Performed by: INTERNAL MEDICINE

## 2019-08-02 PROCEDURE — A9585 GADOBUTROL INJECTION: HCPCS | Mod: PO | Performed by: INTERNAL MEDICINE

## 2019-08-02 PROCEDURE — 70553 MRI BRAIN W WO CONTRAST: ICD-10-PCS | Mod: 26,,, | Performed by: RADIOLOGY

## 2019-08-02 RX ORDER — GADOBUTROL 604.72 MG/ML
6 INJECTION INTRAVENOUS
Status: COMPLETED | OUTPATIENT
Start: 2019-08-02 | End: 2019-08-02

## 2019-08-02 RX ADMIN — GADOBUTROL 6 ML: 604.72 INJECTION INTRAVENOUS at 09:08

## 2019-08-05 ENCOUNTER — LAB VISIT (OUTPATIENT)
Dept: LAB | Facility: HOSPITAL | Age: 34
End: 2019-08-05
Payer: MEDICAID

## 2019-08-05 ENCOUNTER — OFFICE VISIT (OUTPATIENT)
Dept: HEMATOLOGY/ONCOLOGY | Facility: CLINIC | Age: 34
End: 2019-08-05
Payer: MEDICAID

## 2019-08-05 VITALS
DIASTOLIC BLOOD PRESSURE: 80 MMHG | RESPIRATION RATE: 16 BRPM | HEIGHT: 66 IN | BODY MASS INDEX: 23.64 KG/M2 | SYSTOLIC BLOOD PRESSURE: 127 MMHG | WEIGHT: 147.06 LBS | HEART RATE: 52 BPM | OXYGEN SATURATION: 98 % | TEMPERATURE: 98 F

## 2019-08-05 DIAGNOSIS — C71.9 GLIOMA: Primary | ICD-10-CM

## 2019-08-05 DIAGNOSIS — C71.9 ASTROCYTOMA BRAIN TUMOR: ICD-10-CM

## 2019-08-05 DIAGNOSIS — Z72.0 TOBACCO USE: ICD-10-CM

## 2019-08-05 DIAGNOSIS — R56.9 SEIZURE: ICD-10-CM

## 2019-08-05 LAB
ALBUMIN SERPL BCP-MCNC: 4.5 G/DL (ref 3.5–5.2)
ALP SERPL-CCNC: 86 U/L (ref 55–135)
ALT SERPL W/O P-5'-P-CCNC: 19 U/L (ref 10–44)
ANION GAP SERPL CALC-SCNC: 8 MMOL/L (ref 8–16)
AST SERPL-CCNC: 20 U/L (ref 10–40)
BASOPHILS # BLD AUTO: 0.05 K/UL (ref 0–0.2)
BASOPHILS NFR BLD: 0.7 % (ref 0–1.9)
BILIRUB SERPL-MCNC: 0.4 MG/DL (ref 0.1–1)
BUN SERPL-MCNC: 12 MG/DL (ref 6–20)
CALCIUM SERPL-MCNC: 9.5 MG/DL (ref 8.7–10.5)
CHLORIDE SERPL-SCNC: 102 MMOL/L (ref 95–110)
CO2 SERPL-SCNC: 27 MMOL/L (ref 23–29)
CREAT SERPL-MCNC: 0.8 MG/DL (ref 0.5–1.4)
DIFFERENTIAL METHOD: NORMAL
EOSINOPHIL # BLD AUTO: 0.1 K/UL (ref 0–0.5)
EOSINOPHIL NFR BLD: 1.2 % (ref 0–8)
ERYTHROCYTE [DISTWIDTH] IN BLOOD BY AUTOMATED COUNT: 12.5 % (ref 11.5–14.5)
EST. GFR  (AFRICAN AMERICAN): >60 ML/MIN/1.73 M^2
EST. GFR  (NON AFRICAN AMERICAN): >60 ML/MIN/1.73 M^2
GLUCOSE SERPL-MCNC: 84 MG/DL (ref 70–110)
HCT VFR BLD AUTO: 45 % (ref 40–54)
HGB BLD-MCNC: 15.4 G/DL (ref 14–18)
IMM GRANULOCYTES # BLD AUTO: 0.03 K/UL (ref 0–0.04)
IMM GRANULOCYTES NFR BLD AUTO: 0.4 % (ref 0–0.5)
LYMPHOCYTES # BLD AUTO: 1.7 K/UL (ref 1–4.8)
LYMPHOCYTES NFR BLD: 25 % (ref 18–48)
MCH RBC QN AUTO: 30.3 PG (ref 27–31)
MCHC RBC AUTO-ENTMCNC: 34.2 G/DL (ref 32–36)
MCV RBC AUTO: 89 FL (ref 82–98)
MONOCYTES # BLD AUTO: 0.7 K/UL (ref 0.3–1)
MONOCYTES NFR BLD: 10.3 % (ref 4–15)
NEUTROPHILS # BLD AUTO: 4.2 K/UL (ref 1.8–7.7)
NEUTROPHILS NFR BLD: 62.4 % (ref 38–73)
NRBC BLD-RTO: 0 /100 WBC
PLATELET # BLD AUTO: 271 K/UL (ref 150–350)
PMV BLD AUTO: 12 FL (ref 9.2–12.9)
POTASSIUM SERPL-SCNC: 4.5 MMOL/L (ref 3.5–5.1)
PROT SERPL-MCNC: 7.4 G/DL (ref 6–8.4)
RBC # BLD AUTO: 5.08 M/UL (ref 4.6–6.2)
SODIUM SERPL-SCNC: 137 MMOL/L (ref 136–145)
WBC # BLD AUTO: 6.72 K/UL (ref 3.9–12.7)

## 2019-08-05 PROCEDURE — 99999 PR PBB SHADOW E&M-EST. PATIENT-LVL III: ICD-10-PCS | Mod: PBBFAC,,, | Performed by: INTERNAL MEDICINE

## 2019-08-05 PROCEDURE — 85025 COMPLETE CBC W/AUTO DIFF WBC: CPT

## 2019-08-05 PROCEDURE — 36415 COLL VENOUS BLD VENIPUNCTURE: CPT

## 2019-08-05 PROCEDURE — 99215 PR OFFICE/OUTPT VISIT, EST, LEVL V, 40-54 MIN: ICD-10-PCS | Mod: S$PBB,,, | Performed by: INTERNAL MEDICINE

## 2019-08-05 PROCEDURE — 99215 OFFICE O/P EST HI 40 MIN: CPT | Mod: S$PBB,,, | Performed by: INTERNAL MEDICINE

## 2019-08-05 PROCEDURE — 99999 PR PBB SHADOW E&M-EST. PATIENT-LVL III: CPT | Mod: PBBFAC,,, | Performed by: INTERNAL MEDICINE

## 2019-08-05 PROCEDURE — 80053 COMPREHEN METABOLIC PANEL: CPT

## 2019-08-05 PROCEDURE — 99213 OFFICE O/P EST LOW 20 MIN: CPT | Mod: PBBFAC | Performed by: INTERNAL MEDICINE

## 2019-08-05 NOTE — PROGRESS NOTES
Hematology and Medical Oncology   Follow Up     08/05/2019    Primary Oncologic Diagnosis: Grade II Glioma     History of Present Ilness:   Дмитрий Winn (Дмитрий) is a pleasant 34 y.o.male who presents today to discuss long term follow up plans for his low grade glioma. He is doing well 6 months post op with no side effects, complications or deficits.      Oncology History:  --On August 8, 2018 he presented to [facility].  presented to the ED via EMS following a seizure. Neuroimaging showed a large frontal mass with an unusual appearance suspicious for low grade glioma or neuroglial cyst.   --CT on 8/8/18 INTRACRANIAL: 4.8 x 3.5 x 3.6 cm  (AP x TR x CC) relatively homogeneous hypoattenuating air lesion favored to be intra-axial within the inferior right frontal lobe.  Though there does appear to be surface which is partially abuts the floor of the anterior cranial fossa.  Trace leftward anterior midline bowing.  Minimal mass effect on the anterior horn of the right lateral ventricle.  Basal cisterns are patent.  No acute intracranial hemorrhage.  No hydrocephalus.  --MRI on 8/8/18 Diffusion-weighted imaging is negative for acute or subacute ischemia.  There is right frontal lobe lesion appears likely intraparenchymal less likely dural arising from the floor of the anterior cranial fossa.  This measures 4.6 x 3.5 cm transverse by 3.8 cm craniocaudad.  This is T1 hypointense, T2 hyperintense with some mild heterogeneity within.  No enhancement is appreciated.  There is mild associated vasogenic edema.  There is mild right to left midline shift anterior falcine level of approximately 3 mm.  -- right supraorbital frontal craniotomy and resection of gyrus rectus tumor on 8/10/2018. 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  Expected postoperative changes.  Signal along  the inferior margin of the resection cavity in the gyrus rectus and orbital gyrus is similar to preoperative study.  --MRI on 1/11/19 Lesion best viewed on the this measures 3.7 x 2.6 cm in maximal transverse dimension on today's study and over 2.6 cm in craniocaudad extent (compared to 3.5 x 2.2 x 2.0 cm respectively on 09/19/2018)   --MRI on 4/29/19 The residual T2 hyperintense lesion in the inferior right frontal lobe measures approximately 35 x 26 x 26 mm  --MRI on 8/2/19 There is no change in the appearance of the residual inferior right frontal lobe mass when compared to the most recent prior study when measured the at similar levels.  The mass measures approximately 3.5 x 2.6 x 3.6 cm.  As discussed above, slight discrepancy in craniocaudad measurement between the current study in the most recent prior study is based on measuring defer location and different sequences (sagittal versus coronal).  By my measurement, there is no change.  There is no new abnormality.     Interval History:  Mr. Winn is doing exceptionally well. He denies all issues. 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    PAST MEDICAL HISTORY:   No past medical history on file.    PAST SURGICAL HISTORY:   Past Surgical History:   Procedure Laterality Date    CRANIOTOMY, FOR INTRACRANIAL NEOPLASM EXCISION Right eyebrow incision / LAY[facility] craniotomy for tumor resection Right 8/10/2018    Performed by Lefty Paulson MD at [facility] OR       PAST SOCIAL HISTORY:   reports that he quit smoking about a year ago. His smoking use included cigarettes. He started smoking about 9 years ago. He has a 8.00 pack-year smoking history. He does not have any smokeless tobacco history on file. He reports that he drinks about 15.0 oz of alcohol per week. He reports that he does not use drugs.    FAMILY HISTORY:  Family History   Problem Relation Age of  Onset    Cancer Maternal Grandfather     Prostate cancer Maternal Grandfather     Cancer Paternal Grandmother     Breast cancer Paternal Grandmother        CURRENT MEDICATIONS:   No current outpatient medications on file.     No current facility-administered medications for this visit.      ALLERGIES:   Review of patient's allergies indicates:  No Known Allergies      Review of Systems:     Review of Systems   Constitutional: Negative for appetite change, chills, diaphoresis, fatigue, fever and unexpected weight change.   HENT:   Negative for hearing loss, mouth sores, nosebleeds, sore throat, trouble swallowing and voice change.    Eyes: Negative for eye problems and icterus.   Respiratory: Negative for chest tightness, cough, hemoptysis, shortness of breath and wheezing.    Cardiovascular: Negative for chest pain, leg swelling and palpitations.   Gastrointestinal: Negative for abdominal distention, abdominal pain, blood in stool, diarrhea, nausea and vomiting.   Endocrine: Negative for hot flashes.   Genitourinary: Negative for bladder incontinence, difficulty urinating, dysuria and hematuria.    Musculoskeletal: Negative for arthralgias, back pain, flank pain, gait problem, myalgias, neck pain and neck stiffness.   Skin: Negative for itching, rash and wound.   Neurological: Negative for dizziness, extremity weakness, gait problem, headaches, numbness, seizures and speech difficulty.   Hematological: Negative for adenopathy. Does not bruise/bleed easily.   Psychiatric/Behavioral: Negative for confusion, depression and sleep disturbance. The patient is not nervous/anxious.           Physical Exam:     Vitals:    08/05/19 1142   BP: 127/80   Pulse: (!) 52   Resp: 16   Temp: 98.2 °F (36.8 °C)       Physical Exam   Constitutional: He is oriented to person, place, and time. He appears well-developed and well-nourished. No distress.   HENT:   Head: Normocephalic and atraumatic.   Mouth/Throat: Oropharynx is clear  and moist. No oropharyngeal exudate.   Well healed right forehead incision    Eyes: Pupils are equal, round, and reactive to light. Conjunctivae and EOM are normal.   Neck: Normal range of motion. Neck supple. No JVD present. No tracheal deviation present. No thyromegaly present.   Cardiovascular: Normal rate, regular rhythm and normal heart sounds. Exam reveals no friction rub.   No murmur heard.  Pulmonary/Chest: Effort normal and breath sounds normal. No stridor. No respiratory distress. He has no wheezes. He has no rales. He exhibits no tenderness.   Abdominal: Soft. Bowel sounds are normal. He exhibits no distension. There is no tenderness. There is no rebound and no guarding.   Musculoskeletal: Normal range of motion. He exhibits no edema, tenderness or deformity.   Lymphadenopathy:     He has no axillary adenopathy.   Neurological: He is alert and oriented to person, place, and time. He displays normal reflexes. No cranial nerve deficit or sensory deficit. He exhibits normal muscle tone. Coordination normal.   Skin: Skin is warm and dry. Capillary refill takes less than 2 seconds. No rash noted. He is not diaphoretic. No erythema. No pallor.   Psychiatric: He has a normal mood and affect. His behavior is normal. Judgment and thought content normal.       ECOG Performance Status: (foot note - ECOG PS provided by Eastern Cooperative Oncology Group) 0 - Asymptomatic    Karnofsky Performance Score:  100%- Normal, No Complaints, No Evidence of Disease    Labs:   Lab Results   Component Value Date    WBC 9.12 01/11/2019    HGB 14.6 01/11/2019    HCT 44.0 01/11/2019     01/11/2019    ALT 21 01/11/2019    AST 25 01/11/2019     (L) 01/11/2019    K 4.2 01/11/2019    CL 98 01/11/2019    CREATININE 0.8 01/11/2019    BUN 11 01/11/2019    CO2 27 01/11/2019    INR 1.0 08/09/2018         Imaging:Previous imaging has been reviewed   Assessment and Plan:     Mr. Winn is a 34 year old male who presents today  for surveillance planning of his Grade II Glioma.      Astrocytoma  --WHO Grade II that was resected on 8/10/18  --Will do q3 month brain MRI's as active surveillance, the next scan is planned for November 2019  --No current indication for therapy        30 minutes were spent face to face with the patient and his  family to discuss the disease, natural history, treatment options and survival statistics. I have provided the patient with an opportunity to ask questions and have all questions answered to his satisfaction.       he will return to clinic in 3 months, but knows to call in the interim if symptoms change or should a problem arise.        Dasha Coronado MD  Hematology and Medical Oncology  Bone Marrow Transplant  [facility]

## 2019-10-09 ENCOUNTER — TELEPHONE (OUTPATIENT)
Dept: NEUROSURGERY | Facility: CLINIC | Age: 34
End: 2019-10-09

## 2019-10-09 NOTE — TELEPHONE ENCOUNTER
----- Message from Manda Graham PA-C sent at 10/9/2019 12:42 AM CDT -----  Followed by [facility] has MRI in Nov. Please put on our recall list for tumor to look at it. S/p crani 8/10/18 by dr perera. Will need MRI every 6 months   WHO II astrocytoma    Last MRI 8/2019 showed tumor.

## 2019-10-31 ENCOUNTER — TELEPHONE (OUTPATIENT)
Dept: HEMATOLOGY/ONCOLOGY | Facility: CLINIC | Age: 34
End: 2019-10-31

## 2019-10-31 NOTE — TELEPHONE ENCOUNTER
----- Message from Adryan Rodríguez sent at 10/31/2019  9:53 AM CDT -----  Contact: patient   Pt need to reschedule his upcoming appointment to after 11/22, please call back at [phone] (home)

## 2019-10-31 NOTE — TELEPHONE ENCOUNTER
Called pt and no answer.   Rescheduled apps per his request in left message  Sending letter in mail

## 2019-11-11 ENCOUNTER — TELEPHONE (OUTPATIENT)
Dept: HEMATOLOGY/ONCOLOGY | Facility: CLINIC | Age: 34
End: 2019-11-11

## 2019-11-11 NOTE — TELEPHONE ENCOUNTER
Left message for pt notifying that we can cancel appointments for now and will reschedule once January schedule is open

## 2019-11-11 NOTE — TELEPHONE ENCOUNTER
----- Message from Shadia Nur sent at 11/11/2019 11:57 AM CST -----  Contact: self  Type:  Appointment Request      Patient states need to reschedule all appointments for 1/2/2020 due to insurance goes in affect January    Name of Caller:Patient   When is the first available appointment?  Symptoms:  Would the patient rather a call back or a response via [employer]? call  Best Call Back Number:[phone]  Additional Information:

## 2020-01-14 ENCOUNTER — LAB VISIT (OUTPATIENT)
Dept: LAB | Facility: HOSPITAL | Age: 35
End: 2020-01-14
Attending: INTERNAL MEDICINE
Payer: COMMERCIAL

## 2020-01-14 ENCOUNTER — OFFICE VISIT (OUTPATIENT)
Dept: HEMATOLOGY/ONCOLOGY | Facility: CLINIC | Age: 35
End: 2020-01-14
Payer: COMMERCIAL

## 2020-01-14 VITALS
BODY MASS INDEX: 23.81 KG/M2 | WEIGHT: 148.13 LBS | OXYGEN SATURATION: 100 % | SYSTOLIC BLOOD PRESSURE: 125 MMHG | HEART RATE: 71 BPM | RESPIRATION RATE: 16 BRPM | DIASTOLIC BLOOD PRESSURE: 75 MMHG | TEMPERATURE: 98 F | HEIGHT: 66 IN

## 2020-01-14 DIAGNOSIS — C71.9 ASTROCYTOMA BRAIN TUMOR: ICD-10-CM

## 2020-01-14 DIAGNOSIS — Z72.0 TOBACCO USE: ICD-10-CM

## 2020-01-14 DIAGNOSIS — R56.9 SEIZURE: ICD-10-CM

## 2020-01-14 DIAGNOSIS — C71.9 GLIOMA: Primary | ICD-10-CM

## 2020-01-14 DIAGNOSIS — G93.89 RIGHT FRONTAL LOBE MASS: ICD-10-CM

## 2020-01-14 LAB
ALBUMIN SERPL BCP-MCNC: 4.4 G/DL (ref 3.5–5.2)
ALP SERPL-CCNC: 72 U/L (ref 55–135)
ALT SERPL W/O P-5'-P-CCNC: 23 U/L (ref 10–44)
ANION GAP SERPL CALC-SCNC: 10 MMOL/L (ref 8–16)
AST SERPL-CCNC: 19 U/L (ref 10–40)
BASOPHILS # BLD AUTO: 0.09 K/UL (ref 0–0.2)
BASOPHILS NFR BLD: 1.4 % (ref 0–1.9)
BILIRUB SERPL-MCNC: 0.6 MG/DL (ref 0.1–1)
BUN SERPL-MCNC: 8 MG/DL (ref 6–20)
CALCIUM SERPL-MCNC: 9 MG/DL (ref 8.7–10.5)
CHLORIDE SERPL-SCNC: 103 MMOL/L (ref 95–110)
CO2 SERPL-SCNC: 26 MMOL/L (ref 23–29)
CREAT SERPL-MCNC: 0.9 MG/DL (ref 0.5–1.4)
DIFFERENTIAL METHOD: NORMAL
EOSINOPHIL # BLD AUTO: 0.1 K/UL (ref 0–0.5)
EOSINOPHIL NFR BLD: 1.2 % (ref 0–8)
ERYTHROCYTE [DISTWIDTH] IN BLOOD BY AUTOMATED COUNT: 12.5 % (ref 11.5–14.5)
EST. GFR  (AFRICAN AMERICAN): >60 ML/MIN/1.73 M^2
EST. GFR  (NON AFRICAN AMERICAN): >60 ML/MIN/1.73 M^2
GLUCOSE SERPL-MCNC: 96 MG/DL (ref 70–110)
HCT VFR BLD AUTO: 43.7 % (ref 40–54)
HGB BLD-MCNC: 14.8 G/DL (ref 14–18)
IMM GRANULOCYTES # BLD AUTO: 0.02 K/UL (ref 0–0.04)
IMM GRANULOCYTES NFR BLD AUTO: 0.3 % (ref 0–0.5)
LYMPHOCYTES # BLD AUTO: 1.9 K/UL (ref 1–4.8)
LYMPHOCYTES NFR BLD: 30.1 % (ref 18–48)
MCH RBC QN AUTO: 30 PG (ref 27–31)
MCHC RBC AUTO-ENTMCNC: 33.9 G/DL (ref 32–36)
MCV RBC AUTO: 89 FL (ref 82–98)
MONOCYTES # BLD AUTO: 0.8 K/UL (ref 0.3–1)
MONOCYTES NFR BLD: 11.8 % (ref 4–15)
NEUTROPHILS # BLD AUTO: 3.6 K/UL (ref 1.8–7.7)
NEUTROPHILS NFR BLD: 55.2 % (ref 38–73)
NRBC BLD-RTO: 0 /100 WBC
PLATELET # BLD AUTO: 282 K/UL (ref 150–350)
PMV BLD AUTO: 12.4 FL (ref 9.2–12.9)
POTASSIUM SERPL-SCNC: 4.1 MMOL/L (ref 3.5–5.1)
PROT SERPL-MCNC: 7.3 G/DL (ref 6–8.4)
RBC # BLD AUTO: 4.93 M/UL (ref 4.6–6.2)
SODIUM SERPL-SCNC: 139 MMOL/L (ref 136–145)
WBC # BLD AUTO: 6.45 K/UL (ref 3.9–12.7)

## 2020-01-14 PROCEDURE — 36415 COLL VENOUS BLD VENIPUNCTURE: CPT

## 2020-01-14 PROCEDURE — 85025 COMPLETE CBC W/AUTO DIFF WBC: CPT

## 2020-01-14 PROCEDURE — 99215 PR OFFICE/OUTPT VISIT, EST, LEVL V, 40-54 MIN: ICD-10-PCS | Mod: S$GLB,,, | Performed by: INTERNAL MEDICINE

## 2020-01-14 PROCEDURE — 80053 COMPREHEN METABOLIC PANEL: CPT

## 2020-01-14 PROCEDURE — 3008F BODY MASS INDEX DOCD: CPT | Mod: CPTII,S$GLB,, | Performed by: INTERNAL MEDICINE

## 2020-01-14 PROCEDURE — 3008F PR BODY MASS INDEX (BMI) DOCUMENTED: ICD-10-PCS | Mod: CPTII,S$GLB,, | Performed by: INTERNAL MEDICINE

## 2020-01-14 PROCEDURE — 99999 PR PBB SHADOW E&M-EST. PATIENT-LVL III: CPT | Mod: PBBFAC,,, | Performed by: INTERNAL MEDICINE

## 2020-01-14 PROCEDURE — 99215 OFFICE O/P EST HI 40 MIN: CPT | Mod: S$GLB,,, | Performed by: INTERNAL MEDICINE

## 2020-01-14 PROCEDURE — 99999 PR PBB SHADOW E&M-EST. PATIENT-LVL III: ICD-10-PCS | Mod: PBBFAC,,, | Performed by: INTERNAL MEDICINE

## 2020-01-14 NOTE — PROGRESS NOTES
Hematology and Medical Oncology   Follow Up     01/14/2020    Primary Oncologic Diagnosis: Grade II Glioma     History of Present Ilness:   Дмитрий Winn (Дмитрий) is a pleasant 34 y.o.male who presents today to discuss long term follow up plans for his low grade glioma. He is doing well 6 months post op with no side effects, complications or deficits.      Oncology History:  --On August 8, 2018 he presented to [facility].  presented to the ED via EMS following a seizure. Neuroimaging showed a large frontal mass with an unusual appearance suspicious for low grade glioma or neuroglial cyst.   --CT on 8/8/18 INTRACRANIAL: 4.8 x 3.5 x 3.6 cm  (AP x TR x CC) relatively homogeneous hypoattenuating air lesion favored to be intra-axial within the inferior right frontal lobe.  Though there does appear to be surface which is partially abuts the floor of the anterior cranial fossa.  Trace leftward anterior midline bowing.  Minimal mass effect on the anterior horn of the right lateral ventricle.  Basal cisterns are patent.  No acute intracranial hemorrhage.  No hydrocephalus.  --MRI on 8/8/18 Diffusion-weighted imaging is negative for acute or subacute ischemia.  There is right frontal lobe lesion appears likely intraparenchymal less likely dural arising from the floor of the anterior cranial fossa.  This measures 4.6 x 3.5 cm transverse by 3.8 cm craniocaudad.  This is T1 hypointense, T2 hyperintense with some mild heterogeneity within.  No enhancement is appreciated.  There is mild associated vasogenic edema.  There is mild right to left midline shift anterior falcine level of approximately 3 mm.  -- right supraorbital frontal craniotomy and resection of gyrus rectus tumor on 8/10/2018. 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  Expected postoperative changes.  Signal along  the inferior margin of the resection cavity in the gyrus rectus and orbital gyrus is similar to preoperative study.  --MRI on 1/11/19 Lesion best viewed on the this measures 3.7 x 2.6 cm in maximal transverse dimension on today's study and over 2.6 cm in craniocaudad extent (compared to 3.5 x 2.2 x 2.0 cm respectively on 09/19/2018)   --MRI on 4/29/19 The residual T2 hyperintense lesion in the inferior right frontal lobe measures approximately 35 x 26 x 26 mm  --MRI on 8/2/19 There is no change in the appearance of the residual inferior right frontal lobe mass when compared to the most recent prior study when measured the at similar levels.  The mass measures approximately 3.5 x 2.6 x 3.6 cm.  As discussed above, slight discrepancy in craniocaudad measurement between the current study in the most recent prior study is based on measuring defer location and different sequences (sagittal versus coronal).  By my measurement, there is no change.  There is no new abnormality.     Interval History:  Mr. Winn is doing exceptionally well. He denies all issues. 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    PAST MEDICAL HISTORY:   No past medical history on file.    PAST SURGICAL HISTORY:   Past Surgical History:   Procedure Laterality Date    CRANIOTOMY FOR EXCISION OF INTRACRANIAL TUMOR Right 8/10/2018    Procedure: CRANIOTOMY, FOR INTRACRANIAL NEOPLASM EXCISION Right eyebrow incision / [facility] craniotomy for tumor resection;  Surgeon: Lefty Paulson MD;  Location: [facility] OR;  Service: Neurosurgery;  Laterality: Right;       PAST SOCIAL HISTORY:   reports that he quit smoking about 17 months ago. His smoking use included cigarettes. He started smoking about 10 years ago. He has a 8.00 pack-year smoking history. He does not have any smokeless tobacco history on file. He reports that he drinks about 25.0 standard  drinks of alcohol per week. He reports that he does not use drugs.    FAMILY HISTORY:  Family History   Problem Relation Age of Onset    Cancer Maternal Grandfather     Prostate cancer Maternal Grandfather     Cancer Paternal Grandmother     Breast cancer Paternal Grandmother        CURRENT MEDICATIONS:   No current outpatient medications on file.     No current facility-administered medications for this visit.      ALLERGIES:   Review of patient's allergies indicates:  No Known Allergies      Review of Systems:     Review of Systems   Constitutional: Negative for appetite change, chills, diaphoresis, fatigue, fever and unexpected weight change.   HENT:   Negative for hearing loss, mouth sores, nosebleeds, sore throat, trouble swallowing and voice change.    Eyes: Negative for eye problems and icterus.   Respiratory: Negative for chest tightness, cough, hemoptysis, shortness of breath and wheezing.    Cardiovascular: Negative for chest pain, leg swelling and palpitations.   Gastrointestinal: Negative for abdominal distention, abdominal pain, blood in stool, diarrhea, nausea and vomiting.   Endocrine: Negative for hot flashes.   Genitourinary: Negative for bladder incontinence, difficulty urinating, dysuria and hematuria.    Musculoskeletal: Negative for arthralgias, back pain, flank pain, gait problem, myalgias, neck pain and neck stiffness.   Skin: Negative for itching, rash and wound.   Neurological: Negative for dizziness, extremity weakness, gait problem, headaches, numbness, seizures and speech difficulty.   Hematological: Negative for adenopathy. Does not bruise/bleed easily.   Psychiatric/Behavioral: Negative for confusion, depression and sleep disturbance. The patient is not nervous/anxious.           Physical Exam:     Vitals:    01/14/20 1111   BP: 125/75   Pulse: 71   Resp: 16   Temp: 98.4 °F (36.9 °C)       Physical Exam   Constitutional: He is oriented to person, place, and time. He appears  well-developed and well-nourished. No distress.   HENT:   Head: Normocephalic and atraumatic.   Mouth/Throat: Oropharynx is clear and moist. No oropharyngeal exudate.   Well healed right forehead incision    Eyes: Pupils are equal, round, and reactive to light. Conjunctivae and EOM are normal.   Neck: Normal range of motion. Neck supple. No JVD present. No tracheal deviation present. No thyromegaly present.   Cardiovascular: Normal rate, regular rhythm and normal heart sounds. Exam reveals no friction rub.   No murmur heard.  Pulmonary/Chest: Effort normal and breath sounds normal. No stridor. No respiratory distress. He has no wheezes. He has no rales. He exhibits no tenderness.   Abdominal: Soft. Bowel sounds are normal. He exhibits no distension. There is no tenderness. There is no rebound and no guarding.   Musculoskeletal: Normal range of motion. He exhibits no edema, tenderness or deformity.   Lymphadenopathy:     He has no axillary adenopathy.   Neurological: He is alert and oriented to person, place, and time. He displays normal reflexes. No cranial nerve deficit or sensory deficit. He exhibits normal muscle tone. Coordination normal.   Skin: Skin is warm and dry. Capillary refill takes less than 2 seconds. No rash noted. He is not diaphoretic. No erythema. No pallor.   Psychiatric: He has a normal mood and affect. His behavior is normal. Judgment and thought content normal.       ECOG Performance Status: (foot note - ECOG PS provided by Eastern Cooperative Oncology Group) 0 - Asymptomatic    Karnofsky Performance Score:  100%- Normal, No Complaints, No Evidence of Disease    Labs:   Lab Results   Component Value Date    WBC 6.45 01/14/2020    HGB 14.8 01/14/2020    HCT 43.7 01/14/2020     01/14/2020    ALT 23 01/14/2020    AST 19 01/14/2020     01/14/2020    K 4.1 01/14/2020     01/14/2020    CREATININE 0.9 01/14/2020    BUN 8 01/14/2020    CO2 26 01/14/2020    INR 1.0 08/09/2018          Imaging:Previous imaging has been reviewed   Assessment and Plan:     Mr. Winn is a 34 year old male who presents today for surveillance planning of his Grade II Glioma.      Astrocytoma  --WHO Grade II that was resected on 8/10/18  --Will do q3 month brain MRI's as active surveillance, the next scan is planned for November 2019  --No current indication for therapy        30 minutes were spent face to face with the patient and his  family to discuss the disease, natural history, treatment options and survival statistics. I have provided the patient with an opportunity to ask questions and have all questions answered to his satisfaction.       he will return to clinic in 3 months, but knows to call in the interim if symptoms change or should a problem arise.        Dasha Coronado MD  Hematology and Medical Oncology  Bone Marrow Transplant  [facility]

## 2020-01-14 NOTE — PROGRESS NOTES
Hematology and Medical Oncology   Follow Up     01/14/2020    Primary Oncologic Diagnosis: Grade II Glioma     History of Present Ilness:   Дмитрий Winn (Дмитрий) is a pleasant 34 y.o.male who presents today to discuss long term follow up plans for his low grade glioma. He is doing well 6 months post op with no side effects, complications or deficits.      Oncology History:  --On August 8, 2018 he presented to [facility].  presented to the ED via EMS following a seizure. Neuroimaging showed a large frontal mass with an unusual appearance suspicious for low grade glioma or neuroglial cyst.   --CT on 8/8/18 INTRACRANIAL: 4.8 x 3.5 x 3.6 cm  (AP x TR x CC) relatively homogeneous hypoattenuating air lesion favored to be intra-axial within the inferior right frontal lobe.  Though there does appear to be surface which is partially abuts the floor of the anterior cranial fossa.  Trace leftward anterior midline bowing.  Minimal mass effect on the anterior horn of the right lateral ventricle.  Basal cisterns are patent.  No acute intracranial hemorrhage.  No hydrocephalus.  --MRI on 8/8/18 Diffusion-weighted imaging is negative for acute or subacute ischemia.  There is right frontal lobe lesion appears likely intraparenchymal less likely dural arising from the floor of the anterior cranial fossa.  This measures 4.6 x 3.5 cm transverse by 3.8 cm craniocaudad.  This is T1 hypointense, T2 hyperintense with some mild heterogeneity within.  No enhancement is appreciated.  There is mild associated vasogenic edema.  There is mild right to left midline shift anterior falcine level of approximately 3 mm.  -- right supraorbital frontal craniotomy and resection of gyrus rectus tumor on 8/10/2018. 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  Expected postoperative changes.  Signal along  the inferior margin of the resection cavity in the gyrus rectus and orbital gyrus is similar to preoperative study.  --MRI on 1/11/19 Lesion best viewed on the this measures 3.7 x 2.6 cm in maximal transverse dimension on today's study and over 2.6 cm in craniocaudad extent (compared to 3.5 x 2.2 x 2.0 cm respectively on 09/19/2018)   --MRI on 4/29/19 The residual T2 hyperintense lesion in the inferior right frontal lobe measures approximately 35 x 26 x 26 mm  --MRI on 8/2/19 There is no change in the appearance of the residual inferior right frontal lobe mass when compared to the most recent prior study when measured the at similar levels.  The mass measures approximately 3.5 x 2.6 x 3.6 cm.  As discussed above, slight discrepancy in craniocaudad measurement between the current study in the most recent prior study is based on measuring defer location and different sequences (sagittal versus coronal).  By my measurement, there is no change.  There is no new abnormality.   --MRI on 1/9/2020 Right frontal resection cavity is similar appearance with residual 3.8 x 2.9 x 3.6 cm (AP x TR x CC) right inferior frontal lesion (series 12, image 84, series 14, image 46).  The size is not significantly changed from prior study as remeasured given differences in slice orientation.  It again demonstrates T1 hypointense, nonenhancing, T2 hyperintense and T2 FLAIR peripherally hyperintense and centrally isointense to white matter.  No abnormal associated enhancement.  No significant surrounding T2 FLAIR signal abnormality.  No associated significant intracranial mass effect.    Interval History:  Mr. Winn presents today with his mom. He is doing exceptionally well. He denies all issues. 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 Work has been busy for him throughout the holidays.    PAST MEDICAL HISTORY:   No past medical history on file.    PAST SURGICAL HISTORY:   Past Surgical History:   Procedure  Laterality Date    CRANIOTOMY FOR EXCISION OF INTRACRANIAL TUMOR Right 8/10/2018    Procedure: CRANIOTOMY, FOR INTRACRANIAL NEOPLASM EXCISION Right eyebrow incision / LAYNE [employer] craniotomy for tumor resection;  Surgeon: Lefty Paulson MD;  Location: [facility];  Service: Neurosurgery;  Laterality: Right;       PAST SOCIAL HISTORY:   reports that he quit smoking about 17 months ago. His smoking use included cigarettes. He started smoking about 10 years ago. He has a 8.00 pack-year smoking history. He does not have any smokeless tobacco history on file. He reports that he drinks about 25.0 standard drinks of alcohol per week. He reports that he does not use drugs.    FAMILY HISTORY:  Family History   Problem Relation Age of Onset    Cancer Maternal Grandfather     Prostate cancer Maternal Grandfather     Cancer Paternal Grandmother     Breast cancer Paternal Grandmother        CURRENT MEDICATIONS:   No current outpatient medications on file.     No current facility-administered medications for this visit.      ALLERGIES:   Review of patient's allergies indicates:  No Known Allergies      Review of Systems:     Review of Systems   Constitutional: Negative for appetite change, chills, diaphoresis, fatigue, fever and unexpected weight change.   HENT:   Negative for hearing loss, mouth sores, nosebleeds, sore throat, trouble swallowing and voice change.    Eyes: Negative for eye problems and icterus.   Respiratory: Negative for chest tightness, cough, hemoptysis, shortness of breath and wheezing.    Cardiovascular: Negative for chest pain, leg swelling and palpitations.   Gastrointestinal: Negative for abdominal distention, abdominal pain, blood in stool, diarrhea, nausea and vomiting.   Endocrine: Negative for hot flashes.   Genitourinary: Negative for bladder incontinence, difficulty urinating, dysuria and hematuria.    Musculoskeletal: Negative for arthralgias, back pain, flank pain, gait problem, myalgias,  neck pain and neck stiffness.   Skin: Negative for itching, rash and wound.   Neurological: Negative for dizziness, extremity weakness, gait problem, headaches, numbness, seizures and speech difficulty.   Hematological: Negative for adenopathy. Does not bruise/bleed easily.   Psychiatric/Behavioral: Negative for confusion, depression and sleep disturbance. The patient is not nervous/anxious.           Physical Exam:     Vitals:    01/14/20 1111   BP: 125/75   Pulse: 71   Resp: 16   Temp: 98.4 °F (36.9 °C)       Physical Exam   Constitutional: He is oriented to person, place, and time. He appears well-developed and well-nourished. No distress.   HENT:   Head: Normocephalic and atraumatic.   Mouth/Throat: Oropharynx is clear and moist. No oropharyngeal exudate.   Well healed right forehead incision    Eyes: Pupils are equal, round, and reactive to light. Conjunctivae and EOM are normal.   Neck: Normal range of motion. Neck supple. No JVD present. No tracheal deviation present. No thyromegaly present.   Cardiovascular: Normal rate, regular rhythm and normal heart sounds. Exam reveals no friction rub.   No murmur heard.  Pulmonary/Chest: Effort normal and breath sounds normal. No stridor. No respiratory distress. He has no wheezes. He has no rales. He exhibits no tenderness.   Abdominal: Soft. Bowel sounds are normal. He exhibits no distension. There is no tenderness. There is no rebound and no guarding.   Musculoskeletal: Normal range of motion. He exhibits no edema, tenderness or deformity.   Lymphadenopathy:     He has no axillary adenopathy.   Neurological: He is alert and oriented to person, place, and time. He displays normal reflexes. No cranial nerve deficit or sensory deficit. He exhibits normal muscle tone. Coordination normal.   Skin: Skin is warm and dry. Capillary refill takes less than 2 seconds. No rash noted. He is not diaphoretic. No erythema. No pallor.   Psychiatric: He has a normal mood and affect.  His behavior is normal. Judgment and thought content normal.       ECOG Performance Status: (foot note - ECOG PS provided by Eastern Cooperative Oncology Group) 0 - Asymptomatic    Karnofsky Performance Score:  100%- Normal, No Complaints, No Evidence of Disease    Labs:   Lab Results   Component Value Date    WBC 6.45 01/14/2020    HGB 14.8 01/14/2020    HCT 43.7 01/14/2020     01/14/2020    ALT 23 01/14/2020    AST 19 01/14/2020     01/14/2020    K 4.1 01/14/2020     01/14/2020    CREATININE 0.9 01/14/2020    BUN 8 01/14/2020    CO2 26 01/14/2020    INR 1.0 08/09/2018         Imaging:Previous imaging has been reviewed   Assessment and Plan:     Mr. Winn is a 34 year old male who presents today for surveillance planning of his Grade II Glioma.      Astrocytoma  --WHO Grade II that was resected on 8/10/18  --Will do q3 month brain MRI's as active surveillance, the next scan is planned for April 2020  --No current indication for therapy        30 minutes were spent face to face with the patient and his  family to discuss the disease, natural history, treatment options and survival statistics. I have provided the patient with an opportunity to ask questions and have all questions answered to his satisfaction.       he will return to clinic in 3 months, but knows to call in the interim if symptoms change or should a problem arise.        Dasha Coronado MD  Hematology and Medical Oncology  Bone Marrow Transplant  [facility]

## 2020-04-09 ENCOUNTER — TELEPHONE (OUTPATIENT)
Dept: HEMATOLOGY/ONCOLOGY | Facility: CLINIC | Age: 35
End: 2020-04-09

## 2020-04-09 NOTE — TELEPHONE ENCOUNTER
Attempted to contact pt in regards to changing 4/15 appt to virtual visit. No answer, left vm with callback number.

## 2020-05-13 ENCOUNTER — PATIENT MESSAGE (OUTPATIENT)
Dept: HEMATOLOGY/ONCOLOGY | Facility: CLINIC | Age: 35
End: 2020-05-13

## 2020-05-15 ENCOUNTER — TELEPHONE (OUTPATIENT)
Dept: HEMATOLOGY/ONCOLOGY | Facility: CLINIC | Age: 35
End: 2020-05-15

## 2020-05-15 ENCOUNTER — OFFICE VISIT (OUTPATIENT)
Dept: HEMATOLOGY/ONCOLOGY | Facility: CLINIC | Age: 35
End: 2020-05-15
Payer: COMMERCIAL

## 2020-05-15 DIAGNOSIS — R56.9 SEIZURE: ICD-10-CM

## 2020-05-15 DIAGNOSIS — C71.9 ASTROCYTOMA: Primary | ICD-10-CM

## 2020-05-15 DIAGNOSIS — C71.9 ASTROCYTOMA BRAIN TUMOR: ICD-10-CM

## 2020-05-15 PROCEDURE — 99215 PR OFFICE/OUTPT VISIT, EST, LEVL V, 40-54 MIN: ICD-10-PCS | Mod: 95,,, | Performed by: INTERNAL MEDICINE

## 2020-05-15 PROCEDURE — 99215 OFFICE O/P EST HI 40 MIN: CPT | Mod: 95,,, | Performed by: INTERNAL MEDICINE

## 2020-05-15 NOTE — PROGRESS NOTES
Hematology and Medical Oncology   Follow Up --Virtual Visit     05/15/2020    Primary Oncologic Diagnosis: Grade II Glioma     Telemedicine Documentation:  The patient location is: home  The chief complaint leading to consultation is: astrocytoma  Visit type: audiovisual  Total time spent with patient: 30  Each patient to whom he or she provides medical services by telemedicine is:  (1) informed of the relationship between the physician and patient and the respective role of any other health care provider with respect to management of the patient; and (2) notified that he or she may decline to receive medical services by telemedicine and may withdraw from such care at any time.    History of Present Ilness:   Дмитрий Winn (Дмитрий) is a pleasant 34 y.o.male who presents today to discuss long term follow up plans for his low grade glioma. He is doing well 6 months post op with no side effects, complications or deficits.      Oncology History:  --On August 8, 2018 he presented to [facility].  presented to the ED via EMS following a seizure. Neuroimaging showed a large frontal mass with an unusual appearance suspicious for low grade glioma or neuroglial cyst.   --CT on 8/8/18 INTRACRANIAL: 4.8 x 3.5 x 3.6 cm  (AP x TR x CC) relatively homogeneous hypoattenuating air lesion favored to be intra-axial within the inferior right frontal lobe.  Though there does appear to be surface which is partially abuts the floor of the anterior cranial fossa.  Trace leftward anterior midline bowing.  Minimal mass effect on the anterior horn of the right lateral ventricle.  Basal cisterns are patent.  No acute intracranial hemorrhage.  No hydrocephalus.  --MRI on 8/8/18 Diffusion-weighted imaging is negative for acute or subacute ischemia.  There is right frontal lobe lesion appears likely intraparenchymal less likely dural arising from the floor of the anterior cranial fossa.  This measures 4.6 x 3.5 cm  transverse by 3.8 cm craniocaudad.  This is T1 hypointense, T2 hyperintense with some mild heterogeneity within.  No enhancement is appreciated.  There is mild associated vasogenic edema.  There is mild right to left midline shift anterior falcine level of approximately 3 mm.  -- right supraorbital frontal craniotomy and resection of gyrus rectus tumor on 8/10/2018. 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  Expected postoperative changes.  Signal along the inferior margin of the resection cavity in the gyrus rectus and orbital gyrus is similar to preoperative study.  --MRI on 1/11/19 Lesion best viewed on the this measures 3.7 x 2.6 cm in maximal transverse dimension on today's study and over 2.6 cm in craniocaudad extent (compared to 3.5 x 2.2 x 2.0 cm respectively on 09/19/2018)   --MRI on 4/29/19 The residual T2 hyperintense lesion in the inferior right frontal lobe measures approximately 35 x 26 x 26 mm  --MRI on 8/2/19 There is no change in the appearance of the residual inferior right frontal lobe mass when compared to the most recent prior study when measured the at similar levels.  The mass measures approximately 3.5 x 2.6 x 3.6 cm.  As discussed above, slight discrepancy in craniocaudad measurement between the current study in the most recent prior study is based on measuring defer location and different sequences (sagittal versus coronal).  By my measurement, there is no change.  There is no new abnormality.   --MRI on 1/9/2020 Right frontal resection cavity is similar appearance with residual 3.8 x 2.9 x 3.6 cm (AP x TR x CC) right inferior frontal lesion (series 12, image 84, series 14, image 46).  The size is not significantly changed from prior study as remeasured given differences in slice orientation.  It again demonstrates T1 hypointense, nonenhancing, T2 hyperintense  and T2 FLAIR peripherally hyperintense and centrally isointense to white matter.  No abnormal associated enhancement.  No significant surrounding T2 FLAIR signal abnormality.  No associated significant intracranial mass effect.  --MRI on 5/11/20: Stable appearance of residual right inferior frontal lobe lesion compared to prior study.Residual 4.1 x 2.9 x 3.7 cm (AP x TR x CC) right     Interval History:  Mr. Winn presents today virtually. He is doing exceptionally well. He denies all issues. 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    PAST MEDICAL HISTORY:   No past medical history on file.    PAST SURGICAL HISTORY:   Past Surgical History:   Procedure Laterality Date    CRANIOTOMY FOR EXCISION OF INTRACRANIAL TUMOR Right 8/10/2018    Procedure: CRANIOTOMY, FOR INTRACRANIAL NEOPLASM EXCISION Right eyebrow incision / LAYNE [employer] craniotomy for tumor resection;  Surgeon: Lefty Paulson MD;  Location: [facility];  Service: Neurosurgery;  Laterality: Right;       PAST SOCIAL HISTORY:   reports that he quit smoking about 21 months ago. His smoking use included cigarettes. He started smoking about 10 years ago. He has a 8.00 pack-year smoking history. He does not have any smokeless tobacco history on file. He reports that he drinks about 25.0 standard drinks of alcohol per week. He reports that he does not use drugs.    FAMILY HISTORY:  Family History   Problem Relation Age of Onset    Cancer Maternal Grandfather     Prostate cancer Maternal Grandfather     Cancer Paternal Grandmother     Breast cancer Paternal Grandmother        CURRENT MEDICATIONS:   No current outpatient medications on file.     No current facility-administered medications for this visit.      ALLERGIES:   Review of patient's allergies indicates:  No Known Allergies      Review of Systems:     Review of Systems   Constitutional: Negative for appetite  change, chills, diaphoresis, fatigue, fever and unexpected weight change.   HENT:   Negative for hearing loss, mouth sores, nosebleeds, sore throat, trouble swallowing and voice change.    Eyes: Negative for eye problems and icterus.   Respiratory: Negative for chest tightness, cough, hemoptysis, shortness of breath and wheezing.    Cardiovascular: Negative for chest pain, leg swelling and palpitations.   Gastrointestinal: Negative for abdominal distention, abdominal pain, blood in stool, diarrhea, nausea and vomiting.   Endocrine: Negative for hot flashes.   Genitourinary: Negative for bladder incontinence, difficulty urinating, dysuria and hematuria.    Musculoskeletal: Negative for arthralgias, back pain, flank pain, gait problem, myalgias, neck pain and neck stiffness.   Skin: Negative for itching, rash and wound.   Neurological: Negative for dizziness, extremity weakness, gait problem, headaches, numbness, seizures and speech difficulty.   Hematological: Negative for adenopathy. Does not bruise/bleed easily.   Psychiatric/Behavioral: Negative for confusion, depression and sleep disturbance. The patient is not nervous/anxious.           Physical Exam:     Limited due to virtual visit    ECOG Performance Status: (foot note - ECOG PS provided by Eastern Cooperative Oncology Group) 0 - Asymptomatic    Karnofsky Performance Score:  100%- Normal, No Complaints, No Evidence of Disease    Labs:   Lab Results   Component Value Date    WBC 6.45 01/14/2020    HGB 14.8 01/14/2020    HCT 43.7 01/14/2020     01/14/2020    ALT 23 01/14/2020    AST 19 01/14/2020     01/14/2020    K 4.1 01/14/2020     01/14/2020    CREATININE 0.9 01/14/2020    BUN 8 01/14/2020    CO2 26 01/14/2020    INR 1.0 08/09/2018         Imaging:Previous imaging has been reviewed   Assessment and Plan:     Mr. Winn is a 34 year old male who presents today for surveillance planning of his Grade II Glioma.      Astrocytoma  --WHO  Grade II that was resected on 8/10/18  --Will do q4 month brain MRI's as active surveillance, the next scan is planned for September 2020  --No current indication for therapy      30 minutes were spent face to face with the patient and his  family to discuss the disease, natural history, treatment options and survival statistics. I have provided the patient with an opportunity to ask questions and have all questions answered to his satisfaction.     he will return to clinic in 3 months, but knows to call in the interim if symptoms change or should a problem arise.      Dasha Coronado MD  Hematology and Medical Oncology  Bone Marrow Transplant  [facility]

## 2020-05-15 NOTE — TELEPHONE ENCOUNTER
"----- Message from Gisele Stahl sent at 5/15/2020  1:26 PM CDT -----  Contact: Дмитрий  Consult/Advisory:    Name Of Caller: Дмитрий  Provider Name: Dr. Coronado  Does patient feel the need to be seen today? No  Relationship to the Pt?: Self  Contact Preference?: [phone]  What is the nature of the call?:  Patient calling regarding appointment at 130 PM. He thought it was a virtual video appointment could not check in.  Please contact to discuss    Additional Notes:  "Thank you for all that you do for our patients'"      "

## 2020-05-29 PROBLEM — R27.0 ATAXIA: Status: ACTIVE | Noted: 2020-05-29

## 2020-05-30 PROBLEM — G40.209 LOCALIZATION-RELATED (FOCAL) (PARTIAL) SYMPTOMATIC EPILEPSY AND EPILEPTIC SYNDROMES WITH COMPLEX PARTIAL SEIZURES, NOT INTRACTABLE, WITHOUT STATUS EPILEPTICUS: Status: ACTIVE | Noted: 2018-08-11

## 2020-06-01 ENCOUNTER — TELEPHONE (OUTPATIENT)
Dept: NEUROLOGY | Facility: CLINIC | Age: 35
End: 2020-06-01

## 2020-06-01 DIAGNOSIS — G40.209 LOCALIZATION-RELATED (FOCAL) (PARTIAL) SYMPTOMATIC EPILEPSY AND EPILEPTIC SYNDROMES WITH COMPLEX PARTIAL SEIZURES, NOT INTRACTABLE, WITHOUT STATUS EPILEPTICUS: Primary | ICD-10-CM

## 2020-06-01 NOTE — TELEPHONE ENCOUNTER
----- Message from Rashaun Noel, DO sent at 5/30/2020  1:12 PM CDT -----   Hey guys can we please arrange for follow up for this very pleasant young man in the next 6 weeks or so, either with Dr. Olivo or Dileep.  Francesca could also see him as a hospital follow up.  He will also need to get a routine outpatient EEG for baseline.  Thanks

## 2020-06-16 ENCOUNTER — PATIENT MESSAGE (OUTPATIENT)
Dept: NEUROLOGY | Facility: CLINIC | Age: 35
End: 2020-06-16

## 2020-06-16 RX ORDER — LEVETIRACETAM 1000 MG/1
1000 TABLET ORAL 2 TIMES DAILY
Qty: 60 TABLET | Refills: 1 | Status: SHIPPED | OUTPATIENT
Start: 2020-06-16 | End: 2020-07-24

## 2020-06-16 NOTE — TELEPHONE ENCOUNTER
This is a patient seen by Dr. Noel in the hospital who has been scheduled for follow up in clinic with me.  I have not personally seen him.  I will provide refills sufficient to see me in clinic.  He will need outpatient follow up to continue getting refills.

## 2020-07-06 ENCOUNTER — OFFICE VISIT (OUTPATIENT)
Dept: URGENT CARE | Facility: CLINIC | Age: 35
End: 2020-07-06
Payer: COMMERCIAL

## 2020-07-06 VITALS — HEART RATE: 59 BPM | OXYGEN SATURATION: 98 % | TEMPERATURE: 97 F

## 2020-07-06 DIAGNOSIS — R53.83 FATIGUE, UNSPECIFIED TYPE: ICD-10-CM

## 2020-07-06 DIAGNOSIS — Z20.822 EXPOSURE TO COVID-19 VIRUS: Primary | ICD-10-CM

## 2020-07-06 PROCEDURE — 99202 PR OFFICE/OUTPT VISIT, NEW, LEVL II, 15-29 MIN: ICD-10-PCS | Mod: S$GLB,,, | Performed by: FAMILY MEDICINE

## 2020-07-06 PROCEDURE — 99202 OFFICE O/P NEW SF 15 MIN: CPT | Mod: S$GLB,,, | Performed by: FAMILY MEDICINE

## 2020-07-06 PROCEDURE — U0003 INFECTIOUS AGENT DETECTION BY NUCLEIC ACID (DNA OR RNA); SEVERE ACUTE RESPIRATORY SYNDROME CORONAVIRUS 2 (SARS-COV-2) (CORONAVIRUS DISEASE [COVID-19]), AMPLIFIED PROBE TECHNIQUE, MAKING USE OF HIGH THROUGHPUT TECHNOLOGIES AS DESCRIBED BY CMS-2020-01-R: HCPCS

## 2020-07-06 NOTE — PATIENT INSTRUCTIONS

## 2020-07-06 NOTE — PROGRESS NOTES
Subjective:       Patient ID: Дмитрий Winn is a 34 y.o. male.    Vitals:  temperature is 96.8 °F (36 °C). His pulse is 59 (abnormal). His oxygen saturation is 98%.     Chief Complaint: Sore Throat    34-year-old male with a exposure to COVID last week.  Now with slightly scratchy throat with, had cough earlier this week that is now resolved.  No shortness of breath or fever.  No change in taste or smell.    Sore Throat   This is a new problem. Episode onset: few days. The problem has been unchanged. The pain is worse on the left side. There has been no fever. Associated symptoms include congestion. Pertinent negatives include no coughing, ear pain, shortness of breath, stridor or vomiting. He has tried nothing for the symptoms.       Constitution: Positive for sweating and fatigue. Negative for chills and fever.   HENT: Positive for congestion and sore throat. Negative for ear pain, sinus pain, sinus pressure and voice change.    Neck: Negative for painful lymph nodes.   Eyes: Negative for eye redness.   Respiratory: Negative for chest tightness, cough, sputum production, bloody sputum, COPD, shortness of breath, stridor, wheezing and asthma.    Gastrointestinal: Negative for nausea and vomiting.   Musculoskeletal: Negative for muscle ache.   Skin: Negative for rash.   Allergic/Immunologic: Negative for seasonal allergies and asthma.   Hematologic/Lymphatic: Negative for swollen lymph nodes.       Objective:      Physical Exam      Due to state of emergency, patient was not examined per [facility] protocol for this situation. Patient is speaking in complete sentences and in no distress.   Assessment:       1. Exposure to Covid-19 Virus    2. Fatigue, unspecified type        Plan:         Exposure to Covid-19 Virus  -     COVID-19 Routine Screening    Fatigue, unspecified type  -     COVID-19 Routine Screening    WE WILL CALL YOU IN 4-6 DAYS WITH ANY POSITIVE RESULT, OR IF WE SEE YOU HAVE NOT VIEWED ON THE  PATIENT PORTAL

## 2020-07-09 LAB — SARS-COV-2 RNA RESP QL NAA+PROBE: NOT DETECTED

## 2020-07-24 ENCOUNTER — OFFICE VISIT (OUTPATIENT)
Dept: NEUROLOGY | Facility: CLINIC | Age: 35
End: 2020-07-24
Payer: COMMERCIAL

## 2020-07-24 VITALS
HEIGHT: 66 IN | BODY MASS INDEX: 23.66 KG/M2 | HEART RATE: 64 BPM | DIASTOLIC BLOOD PRESSURE: 81 MMHG | WEIGHT: 147.25 LBS | TEMPERATURE: 98 F | RESPIRATION RATE: 20 BRPM | SYSTOLIC BLOOD PRESSURE: 126 MMHG

## 2020-07-24 DIAGNOSIS — G40.209 LOCALIZATION-RELATED (FOCAL) (PARTIAL) SYMPTOMATIC EPILEPSY AND EPILEPTIC SYNDROMES WITH COMPLEX PARTIAL SEIZURES, NOT INTRACTABLE, WITHOUT STATUS EPILEPTICUS: Primary | ICD-10-CM

## 2020-07-24 DIAGNOSIS — C71.9 ASTROCYTOMA: ICD-10-CM

## 2020-07-24 PROCEDURE — 3008F PR BODY MASS INDEX (BMI) DOCUMENTED: ICD-10-PCS | Mod: CPTII,S$GLB,, | Performed by: PSYCHIATRY & NEUROLOGY

## 2020-07-24 PROCEDURE — 99999 PR PBB SHADOW E&M-EST. PATIENT-LVL III: ICD-10-PCS | Mod: PBBFAC,,, | Performed by: PSYCHIATRY & NEUROLOGY

## 2020-07-24 PROCEDURE — 99215 OFFICE O/P EST HI 40 MIN: CPT | Mod: S$GLB,,, | Performed by: PSYCHIATRY & NEUROLOGY

## 2020-07-24 PROCEDURE — 99215 PR OFFICE/OUTPT VISIT, EST, LEVL V, 40-54 MIN: ICD-10-PCS | Mod: S$GLB,,, | Performed by: PSYCHIATRY & NEUROLOGY

## 2020-07-24 PROCEDURE — 99999 PR PBB SHADOW E&M-EST. PATIENT-LVL III: CPT | Mod: PBBFAC,,, | Performed by: PSYCHIATRY & NEUROLOGY

## 2020-07-24 PROCEDURE — 3008F BODY MASS INDEX DOCD: CPT | Mod: CPTII,S$GLB,, | Performed by: PSYCHIATRY & NEUROLOGY

## 2020-07-24 RX ORDER — LEVETIRACETAM 750 MG/1
750 TABLET ORAL 2 TIMES DAILY
Qty: 60 TABLET | Refills: 11 | Status: SHIPPED | OUTPATIENT
Start: 2020-07-24 | End: 2021-07-16
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
## 2020-11-24 ENCOUNTER — PATIENT MESSAGE (OUTPATIENT)
Dept: NEUROLOGY | Facility: CLINIC | Age: 35
End: 2020-11-24

## 2021-02-23 ENCOUNTER — PATIENT MESSAGE (OUTPATIENT)
Dept: HEMATOLOGY/ONCOLOGY | Facility: CLINIC | Age: 36
End: 2021-02-23

## 2021-02-23 DIAGNOSIS — C71.9 ASTROCYTOMA: Primary | ICD-10-CM

## 2021-02-23 DIAGNOSIS — C71.9 GLIOMA: ICD-10-CM

## 2021-03-05 ENCOUNTER — LAB VISIT (OUTPATIENT)
Dept: LAB | Facility: HOSPITAL | Age: 36
End: 2021-03-05
Payer: COMMERCIAL

## 2021-03-05 ENCOUNTER — OFFICE VISIT (OUTPATIENT)
Dept: HEMATOLOGY/ONCOLOGY | Facility: CLINIC | Age: 36
End: 2021-03-05
Payer: COMMERCIAL

## 2021-03-05 VITALS
WEIGHT: 152.56 LBS | OXYGEN SATURATION: 100 % | HEIGHT: 66 IN | DIASTOLIC BLOOD PRESSURE: 77 MMHG | SYSTOLIC BLOOD PRESSURE: 138 MMHG | BODY MASS INDEX: 24.52 KG/M2 | HEART RATE: 68 BPM | RESPIRATION RATE: 17 BRPM | TEMPERATURE: 98 F

## 2021-03-05 DIAGNOSIS — C71.9 ASTROCYTOMA: ICD-10-CM

## 2021-03-05 DIAGNOSIS — C71.9 GLIOMA: ICD-10-CM

## 2021-03-05 DIAGNOSIS — G93.89 RIGHT FRONTAL LOBE MASS: ICD-10-CM

## 2021-03-05 DIAGNOSIS — C71.9 ASTROCYTOMA: Primary | ICD-10-CM

## 2021-03-05 LAB
ALBUMIN SERPL BCP-MCNC: 4.6 G/DL (ref 3.5–5.2)
ALP SERPL-CCNC: 81 U/L (ref 55–135)
ALT SERPL W/O P-5'-P-CCNC: 29 U/L (ref 10–44)
ANION GAP SERPL CALC-SCNC: 9 MMOL/L (ref 8–16)
AST SERPL-CCNC: 25 U/L (ref 10–40)
BASOPHILS # BLD AUTO: 0.07 K/UL (ref 0–0.2)
BASOPHILS NFR BLD: 1 % (ref 0–1.9)
BILIRUB SERPL-MCNC: 0.8 MG/DL (ref 0.1–1)
BUN SERPL-MCNC: 9 MG/DL (ref 6–20)
CALCIUM SERPL-MCNC: 9.7 MG/DL (ref 8.7–10.5)
CHLORIDE SERPL-SCNC: 103 MMOL/L (ref 95–110)
CO2 SERPL-SCNC: 27 MMOL/L (ref 23–29)
CREAT SERPL-MCNC: 1 MG/DL (ref 0.5–1.4)
DIFFERENTIAL METHOD: ABNORMAL
EOSINOPHIL # BLD AUTO: 0.1 K/UL (ref 0–0.5)
EOSINOPHIL NFR BLD: 1.1 % (ref 0–8)
ERYTHROCYTE [DISTWIDTH] IN BLOOD BY AUTOMATED COUNT: 12.3 % (ref 11.5–14.5)
EST. GFR  (AFRICAN AMERICAN): >60 ML/MIN/1.73 M^2
EST. GFR  (NON AFRICAN AMERICAN): >60 ML/MIN/1.73 M^2
GLUCOSE SERPL-MCNC: 99 MG/DL (ref 70–110)
HCT VFR BLD AUTO: 45.3 % (ref 40–54)
HGB BLD-MCNC: 15.1 G/DL (ref 14–18)
IMM GRANULOCYTES # BLD AUTO: 0.02 K/UL (ref 0–0.04)
IMM GRANULOCYTES NFR BLD AUTO: 0.3 % (ref 0–0.5)
LYMPHOCYTES # BLD AUTO: 1.8 K/UL (ref 1–4.8)
LYMPHOCYTES NFR BLD: 24.7 % (ref 18–48)
MCH RBC QN AUTO: 30 PG (ref 27–31)
MCHC RBC AUTO-ENTMCNC: 33.3 G/DL (ref 32–36)
MCV RBC AUTO: 90 FL (ref 82–98)
MONOCYTES # BLD AUTO: 0.9 K/UL (ref 0.3–1)
MONOCYTES NFR BLD: 12.6 % (ref 4–15)
NEUTROPHILS # BLD AUTO: 4.3 K/UL (ref 1.8–7.7)
NEUTROPHILS NFR BLD: 60.3 % (ref 38–73)
NRBC BLD-RTO: 0 /100 WBC
PLATELET # BLD AUTO: 245 K/UL (ref 150–350)
PMV BLD AUTO: 13 FL (ref 9.2–12.9)
POTASSIUM SERPL-SCNC: 5.1 MMOL/L (ref 3.5–5.1)
PROT SERPL-MCNC: 7.6 G/DL (ref 6–8.4)
RBC # BLD AUTO: 5.04 M/UL (ref 4.6–6.2)
SODIUM SERPL-SCNC: 139 MMOL/L (ref 136–145)
WBC # BLD AUTO: 7.17 K/UL (ref 3.9–12.7)

## 2021-03-05 PROCEDURE — 99999 PR PBB SHADOW E&M-EST. PATIENT-LVL III: ICD-10-PCS | Mod: PBBFAC,,, | Performed by: INTERNAL MEDICINE

## 2021-03-05 PROCEDURE — 99215 OFFICE O/P EST HI 40 MIN: CPT | Mod: S$GLB,,, | Performed by: INTERNAL MEDICINE

## 2021-03-05 PROCEDURE — 85025 COMPLETE CBC W/AUTO DIFF WBC: CPT | Performed by: INTERNAL MEDICINE

## 2021-03-05 PROCEDURE — 99999 PR PBB SHADOW E&M-EST. PATIENT-LVL III: CPT | Mod: PBBFAC,,, | Performed by: INTERNAL MEDICINE

## 2021-03-05 PROCEDURE — 3008F BODY MASS INDEX DOCD: CPT | Mod: CPTII,S$GLB,, | Performed by: INTERNAL MEDICINE

## 2021-03-05 PROCEDURE — 80053 COMPREHEN METABOLIC PANEL: CPT | Performed by: INTERNAL MEDICINE

## 2021-03-05 PROCEDURE — 36415 COLL VENOUS BLD VENIPUNCTURE: CPT | Performed by: INTERNAL MEDICINE

## 2021-03-05 PROCEDURE — 99215 PR OFFICE/OUTPT VISIT, EST, LEVL V, 40-54 MIN: ICD-10-PCS | Mod: S$GLB,,, | Performed by: INTERNAL MEDICINE

## 2021-03-05 PROCEDURE — 1126F AMNT PAIN NOTED NONE PRSNT: CPT | Mod: S$GLB,,, | Performed by: INTERNAL MEDICINE

## 2021-03-05 PROCEDURE — 1126F PR PAIN SEVERITY QUANTIFIED, NO PAIN PRESENT: ICD-10-PCS | Mod: S$GLB,,, | Performed by: INTERNAL MEDICINE

## 2021-03-05 PROCEDURE — 3008F PR BODY MASS INDEX (BMI) DOCUMENTED: ICD-10-PCS | Mod: CPTII,S$GLB,, | Performed by: INTERNAL MEDICINE

## 2021-03-08 ENCOUNTER — TELEPHONE (OUTPATIENT)
Dept: NEUROSURGERY | Facility: CLINIC | Age: 36
End: 2021-03-08

## 2021-03-09 ENCOUNTER — OFFICE VISIT (OUTPATIENT)
Dept: NEUROSURGERY | Facility: CLINIC | Age: 36
End: 2021-03-09
Payer: COMMERCIAL

## 2021-03-09 VITALS
WEIGHT: 153 LBS | SYSTOLIC BLOOD PRESSURE: 131 MMHG | HEART RATE: 60 BPM | BODY MASS INDEX: 24.69 KG/M2 | DIASTOLIC BLOOD PRESSURE: 89 MMHG

## 2021-03-09 DIAGNOSIS — C71.9 ASTROCYTOMA: Primary | ICD-10-CM

## 2021-03-09 PROCEDURE — 3008F PR BODY MASS INDEX (BMI) DOCUMENTED: ICD-10-PCS | Mod: CPTII,S$GLB,, | Performed by: NEUROLOGICAL SURGERY

## 2021-03-09 PROCEDURE — 99214 OFFICE O/P EST MOD 30 MIN: CPT | Mod: S$GLB,,, | Performed by: NEUROLOGICAL SURGERY

## 2021-03-09 PROCEDURE — 99214 PR OFFICE/OUTPT VISIT, EST, LEVL IV, 30-39 MIN: ICD-10-PCS | Mod: S$GLB,,, | Performed by: NEUROLOGICAL SURGERY

## 2021-03-09 PROCEDURE — 99999 PR PBB SHADOW E&M-EST. PATIENT-LVL III: CPT | Mod: PBBFAC,,, | Performed by: NEUROLOGICAL SURGERY

## 2021-03-09 PROCEDURE — 99999 PR PBB SHADOW E&M-EST. PATIENT-LVL III: ICD-10-PCS | Mod: PBBFAC,,, | Performed by: NEUROLOGICAL SURGERY

## 2021-03-09 PROCEDURE — 1126F AMNT PAIN NOTED NONE PRSNT: CPT | Mod: S$GLB,,, | Performed by: NEUROLOGICAL SURGERY

## 2021-03-09 PROCEDURE — 3008F BODY MASS INDEX DOCD: CPT | Mod: CPTII,S$GLB,, | Performed by: NEUROLOGICAL SURGERY

## 2021-03-09 PROCEDURE — 1126F PR PAIN SEVERITY QUANTIFIED, NO PAIN PRESENT: ICD-10-PCS | Mod: S$GLB,,, | Performed by: NEUROLOGICAL SURGERY

## 2021-04-20 ENCOUNTER — HOSPITAL ENCOUNTER (OUTPATIENT)
Dept: RADIOLOGY | Facility: HOSPITAL | Age: 36
Discharge: HOME | End: 2021-04-20
Attending: NEUROLOGICAL SURGERY
Payer: COMMERCIAL

## 2021-04-20 DIAGNOSIS — C71.9 ASTROCYTOMA: Primary | ICD-10-CM

## 2021-04-20 DIAGNOSIS — C71.9 ASTROCYTOMA: ICD-10-CM

## 2021-04-20 PROCEDURE — 70551 MRI BRAIN WITHOUT CONTRAST: ICD-10-PCS | Mod: 26,,, | Performed by: RADIOLOGY

## 2021-04-20 PROCEDURE — 70551 MRI BRAIN STEM W/O DYE: CPT | Mod: TC

## 2021-04-20 PROCEDURE — 70551 MRI BRAIN STEM W/O DYE: CPT | Mod: 26,,, | Performed by: RADIOLOGY

## 2021-04-20 RX ORDER — LORAZEPAM 0.5 MG/1
TABLET ORAL
Qty: 2 TABLET | Refills: 0 | Status: SHIPPED | OUTPATIENT
Start: 2021-04-20 | End: 2022-02-02 | Stop reason: CLARIF

## 2021-04-30 ENCOUNTER — HOSPITAL ENCOUNTER (OUTPATIENT)
Dept: RADIOLOGY | Facility: HOSPITAL | Age: 36
Discharge: HOME | End: 2021-04-30
Attending: NEUROLOGICAL SURGERY
Payer: COMMERCIAL

## 2021-04-30 DIAGNOSIS — C71.9 ASTROCYTOMA: ICD-10-CM

## 2021-04-30 PROCEDURE — 25500020 PHARM REV CODE 255: Performed by: NEUROLOGICAL SURGERY

## 2021-04-30 PROCEDURE — A9585 GADOBUTROL INJECTION: HCPCS | Performed by: NEUROLOGICAL SURGERY

## 2021-04-30 PROCEDURE — 70553 MRI BRAIN STEM W/O & W/DYE: CPT | Mod: 26,,, | Performed by: RADIOLOGY

## 2021-04-30 PROCEDURE — 70553 MRI BRAIN STEM W/O & W/DYE: CPT | Mod: TC

## 2021-04-30 PROCEDURE — 70553 MRI BRAIN (TUMOR WITH PERFUSION) W W/O CONTRAST (XPD): ICD-10-PCS | Mod: 26,,, | Performed by: RADIOLOGY

## 2021-04-30 RX ORDER — GADOBUTROL 604.72 MG/ML
10 INJECTION INTRAVENOUS
Status: COMPLETED | OUTPATIENT
Start: 2021-04-30 | End: 2021-04-30

## 2021-04-30 RX ADMIN — GADOBUTROL 10 ML: 604.72 INJECTION INTRAVENOUS at 04:04

## 2021-05-04 ENCOUNTER — OFFICE VISIT (OUTPATIENT)
Dept: NEUROSURGERY | Facility: CLINIC | Age: 36
End: 2021-05-04
Payer: COMMERCIAL

## 2021-05-04 VITALS
DIASTOLIC BLOOD PRESSURE: 88 MMHG | BODY MASS INDEX: 24.69 KG/M2 | HEART RATE: 64 BPM | SYSTOLIC BLOOD PRESSURE: 148 MMHG | WEIGHT: 153 LBS

## 2021-05-04 DIAGNOSIS — C71.9 ASTROCYTOMA: Primary | ICD-10-CM

## 2021-05-04 PROCEDURE — 99999 PR PBB SHADOW E&M-EST. PATIENT-LVL III: CPT | Mod: PBBFAC,,, | Performed by: NEUROLOGICAL SURGERY

## 2021-05-04 PROCEDURE — 3008F PR BODY MASS INDEX (BMI) DOCUMENTED: ICD-10-PCS | Mod: CPTII,S$GLB,, | Performed by: NEUROLOGICAL SURGERY

## 2021-05-04 PROCEDURE — 1126F AMNT PAIN NOTED NONE PRSNT: CPT | Mod: S$GLB,,, | Performed by: NEUROLOGICAL SURGERY

## 2021-05-04 PROCEDURE — 99214 PR OFFICE/OUTPT VISIT, EST, LEVL IV, 30-39 MIN: ICD-10-PCS | Mod: S$GLB,,, | Performed by: NEUROLOGICAL SURGERY

## 2021-05-04 PROCEDURE — 3008F BODY MASS INDEX DOCD: CPT | Mod: CPTII,S$GLB,, | Performed by: NEUROLOGICAL SURGERY

## 2021-05-04 PROCEDURE — 1126F PR PAIN SEVERITY QUANTIFIED, NO PAIN PRESENT: ICD-10-PCS | Mod: S$GLB,,, | Performed by: NEUROLOGICAL SURGERY

## 2021-05-04 PROCEDURE — 99999 PR PBB SHADOW E&M-EST. PATIENT-LVL III: ICD-10-PCS | Mod: PBBFAC,,, | Performed by: NEUROLOGICAL SURGERY

## 2021-05-04 PROCEDURE — 99214 OFFICE O/P EST MOD 30 MIN: CPT | Mod: S$GLB,,, | Performed by: NEUROLOGICAL SURGERY

## 2021-06-22 ENCOUNTER — PATIENT MESSAGE (OUTPATIENT)
Dept: NEUROSURGERY | Facility: CLINIC | Age: 36
End: 2021-06-22

## 2021-07-06 ENCOUNTER — PATIENT MESSAGE (OUTPATIENT)
Dept: HEMATOLOGY/ONCOLOGY | Facility: CLINIC | Age: 36
End: 2021-07-06

## 2021-08-20 RX ORDER — LEVETIRACETAM 750 MG/1
TABLET ORAL
Qty: 60 TABLET | Refills: 0 | Status: SHIPPED | OUTPATIENT
Start: 2021-08-20 | End: 2021-09-14

## 2021-09-03 ENCOUNTER — PATIENT MESSAGE (OUTPATIENT)
Dept: NEUROSURGERY | Facility: CLINIC | Age: 36
End: 2021-09-03

## 2021-10-12 ENCOUNTER — OFFICE VISIT (OUTPATIENT)
Dept: FAMILY MEDICINE | Facility: CLINIC | Age: 36
End: 2021-10-12
Payer: COMMERCIAL

## 2021-10-12 VITALS
HEIGHT: 66 IN | WEIGHT: 157.88 LBS | HEART RATE: 65 BPM | DIASTOLIC BLOOD PRESSURE: 92 MMHG | SYSTOLIC BLOOD PRESSURE: 132 MMHG | OXYGEN SATURATION: 99 % | BODY MASS INDEX: 25.37 KG/M2

## 2021-10-12 DIAGNOSIS — S99.921A FOOT INJURY, RIGHT, INITIAL ENCOUNTER: Primary | ICD-10-CM

## 2021-10-12 PROCEDURE — 1160F RVW MEDS BY RX/DR IN RCRD: CPT | Mod: CPTII,S$GLB,, | Performed by: FAMILY MEDICINE

## 2021-10-12 PROCEDURE — 3080F PR MOST RECENT DIASTOLIC BLOOD PRESSURE >= 90 MM HG: ICD-10-PCS | Mod: CPTII,S$GLB,, | Performed by: FAMILY MEDICINE

## 2021-10-12 PROCEDURE — 3075F PR MOST RECENT SYSTOLIC BLOOD PRESS GE 130-139MM HG: ICD-10-PCS | Mod: CPTII,S$GLB,, | Performed by: FAMILY MEDICINE

## 2021-10-12 PROCEDURE — 3080F DIAST BP >= 90 MM HG: CPT | Mod: CPTII,S$GLB,, | Performed by: FAMILY MEDICINE

## 2021-10-12 PROCEDURE — 1159F PR MEDICATION LIST DOCUMENTED IN MEDICAL RECORD: ICD-10-PCS | Mod: CPTII,S$GLB,, | Performed by: FAMILY MEDICINE

## 2021-10-12 PROCEDURE — 99214 OFFICE O/P EST MOD 30 MIN: CPT | Mod: S$GLB,,, | Performed by: FAMILY MEDICINE

## 2021-10-12 PROCEDURE — 99214 PR OFFICE/OUTPT VISIT, EST, LEVL IV, 30-39 MIN: ICD-10-PCS | Mod: S$GLB,,, | Performed by: FAMILY MEDICINE

## 2021-10-12 PROCEDURE — 1159F MED LIST DOCD IN RCRD: CPT | Mod: CPTII,S$GLB,, | Performed by: FAMILY MEDICINE

## 2021-10-12 PROCEDURE — 1160F PR REVIEW ALL MEDS BY PRESCRIBER/CLIN PHARMACIST DOCUMENTED: ICD-10-PCS | Mod: CPTII,S$GLB,, | Performed by: FAMILY MEDICINE

## 2021-10-12 PROCEDURE — 99999 PR PBB SHADOW E&M-EST. PATIENT-LVL III: ICD-10-PCS | Mod: PBBFAC,,, | Performed by: FAMILY MEDICINE

## 2021-10-12 PROCEDURE — 3008F PR BODY MASS INDEX (BMI) DOCUMENTED: ICD-10-PCS | Mod: CPTII,S$GLB,, | Performed by: FAMILY MEDICINE

## 2021-10-12 PROCEDURE — 99999 PR PBB SHADOW E&M-EST. PATIENT-LVL III: CPT | Mod: PBBFAC,,, | Performed by: FAMILY MEDICINE

## 2021-10-12 PROCEDURE — 3075F SYST BP GE 130 - 139MM HG: CPT | Mod: CPTII,S$GLB,, | Performed by: FAMILY MEDICINE

## 2021-10-12 PROCEDURE — 3008F BODY MASS INDEX DOCD: CPT | Mod: CPTII,S$GLB,, | Performed by: FAMILY MEDICINE

## 2021-10-12 RX ORDER — INDOMETHACIN 50 MG/1
CAPSULE ORAL
COMMUNITY
Start: 2021-10-08 | End: 2021-10-13

## 2021-10-13 ENCOUNTER — HOSPITAL ENCOUNTER (OUTPATIENT)
Dept: RADIOLOGY | Facility: CLINIC | Age: 36
Discharge: HOME | End: 2021-10-13
Attending: PODIATRIST
Payer: COMMERCIAL

## 2021-10-13 ENCOUNTER — OFFICE VISIT (OUTPATIENT)
Dept: PODIATRY | Facility: CLINIC | Age: 36
End: 2021-10-13
Payer: COMMERCIAL

## 2021-10-13 VITALS — HEIGHT: 66 IN | BODY MASS INDEX: 25.39 KG/M2 | HEART RATE: 76 BPM | WEIGHT: 158 LBS | OXYGEN SATURATION: 100 %

## 2021-10-13 DIAGNOSIS — M79.671 RIGHT FOOT PAIN: ICD-10-CM

## 2021-10-13 DIAGNOSIS — M10.071 ACUTE IDIOPATHIC GOUT OF RIGHT FOOT: Primary | ICD-10-CM

## 2021-10-13 PROCEDURE — 99203 OFFICE O/P NEW LOW 30 MIN: CPT | Mod: S$GLB,,, | Performed by: PODIATRIST

## 2021-10-13 PROCEDURE — 1159F MED LIST DOCD IN RCRD: CPT | Mod: CPTII,S$GLB,, | Performed by: PODIATRIST

## 2021-10-13 PROCEDURE — 1159F PR MEDICATION LIST DOCUMENTED IN MEDICAL RECORD: ICD-10-PCS | Mod: CPTII,S$GLB,, | Performed by: PODIATRIST

## 2021-10-13 PROCEDURE — 73630 X-RAY EXAM OF FOOT: CPT | Mod: RT,S$GLB,, | Performed by: RADIOLOGY

## 2021-10-13 PROCEDURE — 1160F RVW MEDS BY RX/DR IN RCRD: CPT | Mod: CPTII,S$GLB,, | Performed by: PODIATRIST

## 2021-10-13 PROCEDURE — 3008F BODY MASS INDEX DOCD: CPT | Mod: CPTII,S$GLB,, | Performed by: PODIATRIST

## 2021-10-13 PROCEDURE — 3008F PR BODY MASS INDEX (BMI) DOCUMENTED: ICD-10-PCS | Mod: CPTII,S$GLB,, | Performed by: PODIATRIST

## 2021-10-13 PROCEDURE — 73630 XR FOOT COMPLETE 3 VIEW RIGHT: ICD-10-PCS | Mod: RT,S$GLB,, | Performed by: RADIOLOGY

## 2021-10-13 PROCEDURE — 1160F PR REVIEW ALL MEDS BY PRESCRIBER/CLIN PHARMACIST DOCUMENTED: ICD-10-PCS | Mod: CPTII,S$GLB,, | Performed by: PODIATRIST

## 2021-10-13 PROCEDURE — 99203 PR OFFICE/OUTPT VISIT, NEW, LEVL III, 30-44 MIN: ICD-10-PCS | Mod: S$GLB,,, | Performed by: PODIATRIST

## 2021-10-13 RX ORDER — INDOMETHACIN 50 MG/1
50 CAPSULE ORAL 2 TIMES DAILY WITH MEALS
Qty: 90 CAPSULE | Refills: 0 | Status: SHIPPED | OUTPATIENT
Start: 2021-10-13 | End: 2022-09-27

## 2021-10-15 ENCOUNTER — PATIENT MESSAGE (OUTPATIENT)
Dept: NEUROLOGY | Facility: CLINIC | Age: 36
End: 2021-10-15
Payer: COMMERCIAL

## 2021-10-15 RX ORDER — LEVETIRACETAM 750 MG/1
TABLET ORAL
Qty: 60 TABLET | Refills: 0 | Status: SHIPPED | OUTPATIENT
Start: 2021-10-15 | End: 2021-11-20

## 2021-10-15 RX ORDER — LEVETIRACETAM 1000 MG/1
TABLET ORAL
Qty: 60 TABLET | Refills: 0 | Status: SHIPPED | OUTPATIENT
Start: 2021-10-15 | End: 2022-02-02 | Stop reason: CLARIF

## 2021-11-20 RX ORDER — LEVETIRACETAM 750 MG/1
TABLET ORAL
Qty: 60 TABLET | Refills: 0 | Status: SHIPPED | OUTPATIENT
Start: 2021-11-20 | End: 2022-01-07 | Stop reason: SDUPTHER

## 2022-01-07 RX ORDER — LEVETIRACETAM 750 MG/1
750 TABLET ORAL 2 TIMES DAILY
Qty: 60 TABLET | Refills: 1 | Status: SHIPPED | OUTPATIENT
Start: 2022-01-07 | End: 2022-03-18 | Stop reason: SDUPTHER

## 2022-01-07 NOTE — TELEPHONE ENCOUNTER
----- Message from Yanely Fulton sent at 1/7/2022  8:46 AM CST -----  Type:  RX Refill Request    Who Called: mom  Refill or New Rx:  Refill  RX Name and Strength:  levETIRAcetam (KEPPRA) 750 MG Tab  How is the patient currently taking it? (ex. 1XDay):   Take 1 tablet by mouth twice daily  Is this a 30 day or 90 day RX:  60 tablet  Preferred Pharmacy with phone number:    [address] - [address]  [address]  [facility] [address]  Phone: [phone] Fax: [phone]  Local or Mail Order:  local  Ordering Provider:  Johnson Olivo Jr., MD  Best Call Back Number:  [phone]

## 2022-01-24 ENCOUNTER — PATIENT MESSAGE (OUTPATIENT)
Dept: NEUROSURGERY | Facility: CLINIC | Age: 37
End: 2022-01-24
Payer: COMMERCIAL

## 2022-01-29 ENCOUNTER — HOSPITAL ENCOUNTER (OUTPATIENT)
Dept: RADIOLOGY | Facility: HOSPITAL | Age: 37
Discharge: HOME | End: 2022-01-29
Attending: NEUROLOGICAL SURGERY
Payer: COMMERCIAL

## 2022-01-29 DIAGNOSIS — C71.9 ASTROCYTOMA: ICD-10-CM

## 2022-01-29 PROCEDURE — 70553 MRI BRAIN STEM W/O & W/DYE: CPT | Mod: 26,,, | Performed by: RADIOLOGY

## 2022-01-29 PROCEDURE — 25500020 PHARM REV CODE 255: Performed by: NEUROLOGICAL SURGERY

## 2022-01-29 PROCEDURE — 70553 MRI BRAIN (TUMOR WITH PERFUSION) W W/O CONTRAST (XPD): ICD-10-PCS | Mod: 26,,, | Performed by: RADIOLOGY

## 2022-01-29 PROCEDURE — A9585 GADOBUTROL INJECTION: HCPCS | Performed by: NEUROLOGICAL SURGERY

## 2022-01-29 PROCEDURE — 70553 MRI BRAIN STEM W/O & W/DYE: CPT | Mod: TC

## 2022-01-29 RX ORDER — GADOBUTROL 604.72 MG/ML
8 INJECTION INTRAVENOUS
Status: COMPLETED | OUTPATIENT
Start: 2022-01-29 | End: 2022-01-29

## 2022-01-29 RX ADMIN — GADOBUTROL 8 ML: 604.72 INJECTION INTRAVENOUS at 12:01

## 2022-02-01 ENCOUNTER — OFFICE VISIT (OUTPATIENT)
Dept: NEUROSURGERY | Facility: CLINIC | Age: 37
End: 2022-02-01
Payer: COMMERCIAL

## 2022-02-01 ENCOUNTER — TELEPHONE (OUTPATIENT)
Dept: NEUROSURGERY | Facility: CLINIC | Age: 37
End: 2022-02-01
Payer: COMMERCIAL

## 2022-02-01 DIAGNOSIS — Z01.818 PRE-OP TESTING: ICD-10-CM

## 2022-02-01 DIAGNOSIS — C71.9 ASTROCYTOMA: Primary | ICD-10-CM

## 2022-02-01 PROCEDURE — 1159F PR MEDICATION LIST DOCUMENTED IN MEDICAL RECORD: ICD-10-PCS | Mod: CPTII,95,, | Performed by: NEUROLOGICAL SURGERY

## 2022-02-01 PROCEDURE — 1159F MED LIST DOCD IN RCRD: CPT | Mod: CPTII,95,, | Performed by: NEUROLOGICAL SURGERY

## 2022-02-01 PROCEDURE — 99214 PR OFFICE/OUTPT VISIT, EST, LEVL IV, 30-39 MIN: ICD-10-PCS | Mod: 95,,, | Performed by: NEUROLOGICAL SURGERY

## 2022-02-01 PROCEDURE — 1160F RVW MEDS BY RX/DR IN RCRD: CPT | Mod: CPTII,95,, | Performed by: NEUROLOGICAL SURGERY

## 2022-02-01 PROCEDURE — 1160F PR REVIEW ALL MEDS BY PRESCRIBER/CLIN PHARMACIST DOCUMENTED: ICD-10-PCS | Mod: CPTII,95,, | Performed by: NEUROLOGICAL SURGERY

## 2022-02-01 PROCEDURE — 99214 OFFICE O/P EST MOD 30 MIN: CPT | Mod: 95,,, | Performed by: NEUROLOGICAL SURGERY

## 2022-02-01 NOTE — PROGRESS NOTES
The patient location is: Home  The chief complaint leading to consultation is: astrocytoma    Visit type: audiovisual    Face to Face time with patient: 15 minutes of total time spent on the encounter, which includes face to face time and non-face to face time preparing to see the patient (eg, review of tests), Obtaining and/or reviewing separately obtained history, Documenting clinical information in the electronic or other health record, Independently interpreting results (not separately reported) and communicating results to the patient/family/caregiver, or Care coordination (not separately reported).         Each patient to whom he or she provides medical services by telemedicine is:  (1) informed of the relationship between the physician and patient and the respective role of any other health care provider with respect to management of the patient; and (2) notified that he or she may decline to receive medical services by telemedicine and may withdraw from such care at any time.    Notes:   Subjective:   Mahi RANKIN, attest that this documentation has been prepared under the direction and in the presence of Chago Van MD.     Patient ID: Дмитрий Winn is a 36 y.o. male     Chief Complaint: No chief complaint on file.          HPI  MrEmelyn Winn is a 36 y.o. 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 At the time of our last office visit on 5/4/2021, pt had no complaints.     Today the pt reports he has been doing well in the interim and denies any symptoms at this time.     Review of Systems   Constitutional: Negative for activity change, appetite change, fatigue, fever and unexpected weight change.   HENT: Negative for facial swelling.    Eyes: Negative.    Respiratory: Negative.     Cardiovascular: Negative.    Gastrointestinal: Negative for diarrhea, nausea and vomiting.   Endocrine: Negative.    Genitourinary: Negative.    Musculoskeletal: Negative for back pain, joint swelling, myalgias and neck pain.   Neurological: Negative for dizziness, seizures, weakness, numbness and headaches.   Psychiatric/Behavioral: Negative.       Past Medical History:   Diagnosis Date    Cancer     brain tumor    Epilepsy        Objective:      Physical Exam  Constitutional:       General: He is not in acute distress.     Appearance: Normal appearance.   HENT:      Head: Normocephalic and atraumatic.   Neurological:      Mental Status: He is alert and oriented to person, place, and time.            IMAGING:  MRI Brain (Tumor with Perfusion) W WO Contrast (1/29/2022): There is a mild increase in size of the right frontal lesion when compared to the prior study with mild increased mass effect. No surrounding signal abnormality in the adjacent white matter is noted. Minimal enhancement within the posterosuperior portion of the lesion is questioned although this may reflect additional prominent internal vasculature. MR perfusion demonstrates mild increased signal on cerebral blood volume maps centrally within the lesion.      I have personally reviewed the images with the pt.      I, Dr. Chago Van, personally performed the services described in this documentation. All medical record entries made by the scribe, Mahi Mayer, were at my direction and in my presence.  I have reviewed the chart and agree that the record reflects my personal performance and is accurate and complete. Chago Van MD. 02/01/2022    Assessment:       Astrocytoma.     Plan:   I have personally reviewed the MRI brain with the pt which shows mild increase in size of the right frontal lesion when compared to the prior study with mild increased mass effect. No surrounding signal abnormality in the adjacent white matter is noted. Minimal  enhancement within the posterosuperior portion of the lesion is questioned although this may reflect additional prominent internal vasculature. MR perfusion demonstrates mild increased signal on cerebral blood volume maps centrally within the lesion.    I recommend craniotomy. I have discussed the risks/benefits, indications, and alternatives for the proposed procedure in detail. I have answered all of their questions and the pt wishes to proceed with surgery. We will schedule the pt on the next available date.

## 2022-02-01 NOTE — PATIENT INSTRUCTIONS
I have personally reviewed the MRI brain with the pt which shows mild increase in size of the right frontal lesion when compared to the prior study with mild increased mass effect. No surrounding signal abnormality in the adjacent white matter is noted. Minimal enhancement within the posterosuperior portion of the lesion is questioned although this may reflect additional prominent internal vasculature. MR perfusion demonstrates mild increased signal on cerebral blood volume maps centrally within the lesion.    I recommend craniotomy. I have discussed the risks/benefits, indications, and alternatives for the proposed procedure in detail. I have answered all of their questions and the pt wishes to proceed with surgery. We will schedule the pt on the next available date.

## 2022-02-02 ENCOUNTER — TELEPHONE (OUTPATIENT)
Dept: NEUROSURGERY | Facility: CLINIC | Age: 37
End: 2022-02-02
Payer: COMMERCIAL

## 2022-02-02 ENCOUNTER — TELEPHONE (OUTPATIENT)
Dept: PREADMISSION TESTING | Facility: HOSPITAL | Age: 37
End: 2022-02-02
Payer: COMMERCIAL

## 2022-02-02 DIAGNOSIS — Z01.818 PREOPERATIVE TESTING: Primary | ICD-10-CM

## 2022-02-02 NOTE — PRE-PROCEDURE INSTRUCTIONS
Patient stated has not had any problem with anesthesia in the past. Will need medical optimization by Dr Lala and poc appt or NP, and labs. Our  will call to set up these appts.  Preop instructions given. Hold aspirin, aspirin containing products, nsaids(aleve, advil, motrin, ibuprofen, naprosyn, naproxen, voltaren, diclofenac, Indocin), vitamins and supplements one week prior to surgery. May take Tylenol. ( sent to my chart)  Verbalizes understanding.

## 2022-02-02 NOTE — ANESTHESIA PAT ROS NOTE
02/02/2022  Дмитрий Winn is a 36 y.o., male.      Pre-op Assessment          Review of Systems         Anesthesia Assessment: Preoperative EQUATION    Planned Procedure: Procedure(s) (LRB):  CRANIOTOMY, USING FRAMELESS STEREOTAXY Open (Right)  Requested Anesthesia Type:General  Surgeon: Chago Van MD  Service: Neurosurgery  Known or anticipated Date of Surgery:2/17/2022    Surgeon notes: reviewed    Electronic QUestionnaire Assessment completed via nurse interview with patient.        Triage considerations:         Previous anesthesia records:GETA and No problems   8/10/2018 CRANIOTOMY, FOR INTRACRANIAL NEOPLASM EXCISION Right eyebrow incision / LAYNE BrainPath craniotomy for tumor resection (Right Head)  Airway Present Prior to Hospital Arrival?: No Method of Intubation: Direct laryngoscopy Inserted by: CRNA Airway Device: Endotracheal Tube Mask Ventilation: Easy Intubated: Postinduction Airway Device Size: 8.0 Style: Cuffed Cuff Inflation: Minimal occlusive pressure Inflation Amount (mL): 4 Placement Verified By: Auscultation;Capnometry Grade: Grade I Complicating Factors: None Findings Post-Intubation: Positive EtCO2;Bilateral breath sounds;Atraumatic/Condition of teeth unchanged Depth of Insertion (cm): 22 Secured at: Lips Complications: None Breath Sounds: Equal Bilateral Insertion attempts (enter comment if more than 2 attempts): 1 Removal Date: 08/10/18 Removal Time: 1249       Last PCP note: NO PCP  Subspecialty notes: Hematology/Oncology, Neurosurgery, PODIATRY    Other important co-morbidities:PER Epic:  ASTROCYTOMA, EPILEPSY      Tests already available:  Available tests,  within 1 month , 6-12 months ago , within [facility] .1/29/2022 MRI BRAIN( TUMOR W PERFUSION) W W/O CONTRAST, 4/20/2021 MRI BRAIN W/O CONTRAST             Instructions given. (See in Nurse's note)    Optimization:   Anesthesia Preop Clinic Assessment  Indicated    Medical Opinion Indicated          Plan:    Testing:  CBC, CMP, PT/INR, PTT, T&C and T&S   Pre-anesthesia  visit       Visit focus: concerns in complex and/or prolonged anesthesia     Consultation:IM Perioperative Hospitalist OR NP     Patient  has previously scheduled Medical Appointment:2/14 COVID TEST    Navigation: Tests Scheduled. TBD             Consults scheduled.TBD             Results will be tracked by Preop Clinic.  2/11 Labs resulted and noted by Dr. Juan Mendoza.  2/15 Medical optimization by Ramin Etienne NP on 2/15.  France Rodriguez RN BSN

## 2022-02-02 NOTE — TELEPHONE ENCOUNTER
----- Message from France Rodriguez RN sent at 2/2/2022 10:42 AM CST -----  Surgery 2/17  Please schedule Dr. Lala and poc appt or NP, and labs.  Thanks!

## 2022-02-02 NOTE — TELEPHONE ENCOUNTER
Pt responded to an old VM    ----- Message from Brenda Cross sent at 2/2/2022 10:07 AM CST -----  Contact: Patient  Patient requesting call back      [phone] ([place])

## 2022-02-10 ENCOUNTER — PATIENT MESSAGE (OUTPATIENT)
Dept: PREADMISSION TESTING | Facility: HOSPITAL | Age: 37
End: 2022-02-10
Payer: COMMERCIAL

## 2022-02-10 NOTE — PRE-PROCEDURE INSTRUCTIONS
PreOp Instructions given:    -- Medication information (what to hold and what to take)   -- NPO guidelines as follows: (or as per your Surgeon)  1. Stop ALL solid food, gum, candy (including vitamins) 8 hours before surgery/procedure time.  2. Stop all CLOUDY liquids: coffee with creamer, cloudy juices, 6 hours prior to surgery/procedure  time.  3. The patient should be ENCOURAGED to drink carbohydrate-rich clear liquids (sports drinks, clear juices) until 2 hours prior to surgery/procedure  time.  4. CLEAR liquids include only water, black coffee NO creamer, clear oral rehydration drinks, clear sports drinks or clear fruit juices (no orange juice, no pulpy juices, no apple cider).   5. IF IN DOUBT, drink water instead.   6. NOTHING TO DRINK 2 hours before to surgery/procedure  time. If you are told to take medication on the morning of surgery, it may be taken with a sip of water.   -- Arrival place and directions given; time to be given the day before procedure by the Surgeon's Office   -- Bathing with antibacterial soap   -- Don't wear any jewelry or bring any valuables AM of surgery   -- No makeup or moisturizer to face   -- No perfume/cologne, powder, lotions or aftershave       Pt verbalized understanding

## 2022-02-11 ENCOUNTER — OFFICE VISIT (OUTPATIENT)
Dept: INTERNAL MEDICINE | Facility: CLINIC | Age: 37
End: 2022-02-11
Payer: COMMERCIAL

## 2022-02-11 ENCOUNTER — LAB VISIT (OUTPATIENT)
Dept: LAB | Facility: HOSPITAL | Age: 37
End: 2022-02-11
Attending: ANESTHESIOLOGY
Payer: COMMERCIAL

## 2022-02-11 VITALS
SYSTOLIC BLOOD PRESSURE: 138 MMHG | HEART RATE: 77 BPM | BODY MASS INDEX: 25.55 KG/M2 | TEMPERATURE: 98 F | DIASTOLIC BLOOD PRESSURE: 97 MMHG | OXYGEN SATURATION: 98 % | HEIGHT: 66 IN | WEIGHT: 159 LBS

## 2022-02-11 DIAGNOSIS — G40.209 LOCALIZATION-RELATED (FOCAL) (PARTIAL) SYMPTOMATIC EPILEPSY AND EPILEPTIC SYNDROMES WITH COMPLEX PARTIAL SEIZURES, NOT INTRACTABLE, WITHOUT STATUS EPILEPTICUS: ICD-10-CM

## 2022-02-11 DIAGNOSIS — Z72.0 TOBACCO USE: ICD-10-CM

## 2022-02-11 DIAGNOSIS — R27.0 ATAXIA: ICD-10-CM

## 2022-02-11 DIAGNOSIS — C71.9 ASTROCYTOMA: ICD-10-CM

## 2022-02-11 DIAGNOSIS — Z01.818 PREOPERATIVE TESTING: ICD-10-CM

## 2022-02-11 DIAGNOSIS — Z78.9 ALCOHOL CONSUMPTION OF MORE THAN FOUR DRINKS PER DAY: ICD-10-CM

## 2022-02-11 DIAGNOSIS — R06.83 SNORING: ICD-10-CM

## 2022-02-11 DIAGNOSIS — R03.0 ELEVATED BP WITHOUT DIAGNOSIS OF HYPERTENSION: ICD-10-CM

## 2022-02-11 LAB
ABO + RH BLD: NORMAL
ALBUMIN SERPL BCP-MCNC: 4.5 G/DL (ref 3.5–5.2)
ALP SERPL-CCNC: 113 U/L (ref 55–135)
ALT SERPL W/O P-5'-P-CCNC: 63 U/L (ref 10–44)
ANION GAP SERPL CALC-SCNC: 12 MMOL/L (ref 8–16)
APTT BLDCRRT: 27.7 SEC (ref 21–32)
AST SERPL-CCNC: 34 U/L (ref 10–40)
BASOPHILS # BLD AUTO: 0.07 K/UL (ref 0–0.2)
BASOPHILS NFR BLD: 1.1 % (ref 0–1.9)
BILIRUB SERPL-MCNC: 0.5 MG/DL (ref 0.1–1)
BLD GP AB SCN CELLS X3 SERPL QL: NORMAL
BUN SERPL-MCNC: 9 MG/DL (ref 6–20)
CALCIUM SERPL-MCNC: 10 MG/DL (ref 8.7–10.5)
CHLORIDE SERPL-SCNC: 101 MMOL/L (ref 95–110)
CO2 SERPL-SCNC: 26 MMOL/L (ref 23–29)
CREAT SERPL-MCNC: 0.9 MG/DL (ref 0.5–1.4)
DIFFERENTIAL METHOD: ABNORMAL
EOSINOPHIL # BLD AUTO: 0.1 K/UL (ref 0–0.5)
EOSINOPHIL NFR BLD: 1.6 % (ref 0–8)
ERYTHROCYTE [DISTWIDTH] IN BLOOD BY AUTOMATED COUNT: 12.2 % (ref 11.5–14.5)
EST. GFR  (AFRICAN AMERICAN): >60 ML/MIN/1.73 M^2
EST. GFR  (NON AFRICAN AMERICAN): >60 ML/MIN/1.73 M^2
GLUCOSE SERPL-MCNC: 102 MG/DL (ref 70–110)
HCT VFR BLD AUTO: 45.9 % (ref 40–54)
HGB BLD-MCNC: 15.2 G/DL (ref 14–18)
IMM GRANULOCYTES # BLD AUTO: 0.06 K/UL (ref 0–0.04)
IMM GRANULOCYTES NFR BLD AUTO: 0.9 % (ref 0–0.5)
INR PPP: 1 (ref 0.8–1.2)
LYMPHOCYTES # BLD AUTO: 2.1 K/UL (ref 1–4.8)
LYMPHOCYTES NFR BLD: 32.4 % (ref 18–48)
MCH RBC QN AUTO: 29.3 PG (ref 27–31)
MCHC RBC AUTO-ENTMCNC: 33.1 G/DL (ref 32–36)
MCV RBC AUTO: 89 FL (ref 82–98)
MONOCYTES # BLD AUTO: 0.7 K/UL (ref 0.3–1)
MONOCYTES NFR BLD: 11.1 % (ref 4–15)
NEUTROPHILS # BLD AUTO: 3.4 K/UL (ref 1.8–7.7)
NEUTROPHILS NFR BLD: 52.9 % (ref 38–73)
NRBC BLD-RTO: 0 /100 WBC
PLATELET # BLD AUTO: 298 K/UL (ref 150–450)
PMV BLD AUTO: 12.3 FL (ref 9.2–12.9)
POTASSIUM SERPL-SCNC: 4.6 MMOL/L (ref 3.5–5.1)
PROT SERPL-MCNC: 8 G/DL (ref 6–8.4)
PROTHROMBIN TIME: 10.5 SEC (ref 9–12.5)
RBC # BLD AUTO: 5.18 M/UL (ref 4.6–6.2)
SODIUM SERPL-SCNC: 139 MMOL/L (ref 136–145)
WBC # BLD AUTO: 6.39 K/UL (ref 3.9–12.7)

## 2022-02-11 PROCEDURE — 1159F MED LIST DOCD IN RCRD: CPT | Mod: CPTII,S$GLB,, | Performed by: NURSE PRACTITIONER

## 2022-02-11 PROCEDURE — 3080F PR MOST RECENT DIASTOLIC BLOOD PRESSURE >= 90 MM HG: ICD-10-PCS | Mod: CPTII,S$GLB,, | Performed by: NURSE PRACTITIONER

## 2022-02-11 PROCEDURE — 1159F PR MEDICATION LIST DOCUMENTED IN MEDICAL RECORD: ICD-10-PCS | Mod: CPTII,S$GLB,, | Performed by: NURSE PRACTITIONER

## 2022-02-11 PROCEDURE — 86850 RBC ANTIBODY SCREEN: CPT | Performed by: ANESTHESIOLOGY

## 2022-02-11 PROCEDURE — 99214 PR OFFICE/OUTPT VISIT, EST, LEVL IV, 30-39 MIN: ICD-10-PCS | Mod: S$GLB,,, | Performed by: NURSE PRACTITIONER

## 2022-02-11 PROCEDURE — 99999 PR PBB SHADOW E&M-EST. PATIENT-LVL III: ICD-10-PCS | Mod: PBBFAC,,, | Performed by: NURSE PRACTITIONER

## 2022-02-11 PROCEDURE — 3008F BODY MASS INDEX DOCD: CPT | Mod: CPTII,S$GLB,, | Performed by: NURSE PRACTITIONER

## 2022-02-11 PROCEDURE — 85730 THROMBOPLASTIN TIME PARTIAL: CPT | Performed by: ANESTHESIOLOGY

## 2022-02-11 PROCEDURE — 3075F PR MOST RECENT SYSTOLIC BLOOD PRESS GE 130-139MM HG: ICD-10-PCS | Mod: CPTII,S$GLB,, | Performed by: NURSE PRACTITIONER

## 2022-02-11 PROCEDURE — 3008F PR BODY MASS INDEX (BMI) DOCUMENTED: ICD-10-PCS | Mod: CPTII,S$GLB,, | Performed by: NURSE PRACTITIONER

## 2022-02-11 PROCEDURE — 1160F PR REVIEW ALL MEDS BY PRESCRIBER/CLIN PHARMACIST DOCUMENTED: ICD-10-PCS | Mod: CPTII,S$GLB,, | Performed by: NURSE PRACTITIONER

## 2022-02-11 PROCEDURE — 85610 PROTHROMBIN TIME: CPT | Performed by: ANESTHESIOLOGY

## 2022-02-11 PROCEDURE — 80053 COMPREHEN METABOLIC PANEL: CPT | Performed by: ANESTHESIOLOGY

## 2022-02-11 PROCEDURE — 99999 PR PBB SHADOW E&M-EST. PATIENT-LVL III: CPT | Mod: PBBFAC,,, | Performed by: NURSE PRACTITIONER

## 2022-02-11 PROCEDURE — 3080F DIAST BP >= 90 MM HG: CPT | Mod: CPTII,S$GLB,, | Performed by: NURSE PRACTITIONER

## 2022-02-11 PROCEDURE — 3075F SYST BP GE 130 - 139MM HG: CPT | Mod: CPTII,S$GLB,, | Performed by: NURSE PRACTITIONER

## 2022-02-11 PROCEDURE — 99214 OFFICE O/P EST MOD 30 MIN: CPT | Mod: S$GLB,,, | Performed by: NURSE PRACTITIONER

## 2022-02-11 PROCEDURE — 85025 COMPLETE CBC W/AUTO DIFF WBC: CPT | Performed by: ANESTHESIOLOGY

## 2022-02-11 PROCEDURE — 1160F RVW MEDS BY RX/DR IN RCRD: CPT | Mod: CPTII,S$GLB,, | Performed by: NURSE PRACTITIONER

## 2022-02-11 NOTE — ASSESSMENT & PLAN NOTE
/97 and 139/101 today. Denies history of HTN. Reports being anxious due to surgery and preop visit.   Clinic readings since 2020: Systolic range (103-148) and Diastolic range (68-92)  Denies headaches/urine volume changes/dizziness/syncope/vision changes.  Recommend follow-up with PCP for monitoring of BP.  Patient will recheck BP by mother's house on 2/14/22 and call/e-mail with results.

## 2022-02-11 NOTE — HPI
This is a 36 y.o. male  who presents today for a preoperative evaluation in preparation for a Neurosurgery  procedure.  Scheduled for  craniotomy.  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   Patient is new to me.  Details of current problem: The duration of problem is three years.  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 S/P right intracranial craniotomy in August 2018. No chemo or radiation needed per patient. He has had serial MRIs to monitor progress.  Recent MRI of mass has mildly increased in size and with mild increase in mass effect.   Patient is asymptomatic. He denies dizziness, headaches, vision changes or nausea and vomiting.   There are no aggravating or relieving factors.   Denies pain    The history has been obtained by speaking with the patient and reviewing the electronic medical record and/or outside health information. Significant health conditions for the perioperative period are discussed below in assessment and plan.     Patient reports current health status to be Good.  Denies any new symptoms before surgery.

## 2022-02-11 NOTE — PROGRESS NOTES
[facility]  Progress Note    Patient Name: Дмитрий Winn  MRN: [id number]  Date of Evaluation- 02/11/2022  PCP- Primary Doctor No    Future cases for Дмитрий Winn [[id number]]     Case ID Status Date Time Barry Procedure Provider Location    [id number] [facility] 2/17/2022  7:00  CRANIOTOMY, USING FRAMELESS STEREOTAXY Open Chago Van MD [[id number]] [facility] OR 2ND FLR          HPI:  This is a 36 y.o. male  who presents today for a preoperative evaluation in preparation for a Neurosurgery  procedure.  Scheduled for  craniotomy.  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   Patient is new to me.  Details of current problem: The duration of problem is three years.  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 S/P right intracranial craniotomy in August 2018. No chemo or radiation needed per patient. He has had serial MRIs to monitor progress.  Recent MRI of mass has mildly increased in size and with mild increase in mass effect.   Patient is asymptomatic. He denies dizziness, headaches, vision changes or nausea and vomiting.   There are no aggravating or relieving factors.   Denies pain    The history has been obtained by speaking with the patient and reviewing the electronic medical record and/or outside health information. Significant health conditions for the perioperative period are discussed below in assessment and plan.     Patient reports current health status to be Good.  Denies any new symptoms before surgery.          Subjective/ Objective:     Chief Complaint: Preoperative evaulation, perioperative medical management, and complication reduction plan.     Functional Capacity: Very active as a del real; plays baseball and rides skateboard- without CP/SOB.       Anesthesia issues: None    Difficulty mouth opening: No    Steroid use in the last 12 months:  No    Dental Issues: No    Family anesthesia difficulty: None           Family Hx of Thrombosis: None    Past Medical History:    Diagnosis Date    Cancer     brain tumor    Epilepsy          Past Medical History Pertinent Negatives:   Diagnosis Date Noted    Anemia 02/11/2022    Anxiety 02/11/2022    Asthma 02/11/2022    CHF (congestive heart failure) 02/11/2022    COPD (chronic obstructive pulmonary disease) 02/11/2022    Coronary artery disease 02/11/2022    Deep vein thrombosis 02/11/2022    Depression 02/11/2022    Diabetes mellitus, type 2 02/11/2022    Disorder of kidney and ureter 07/06/2020    Emphysema of lung 07/06/2020    GERD (gastroesophageal reflux disease) 02/11/2022    Hyperlipidemia 02/11/2022    Hypertension 02/11/2022    Myocardial infarction 02/11/2022    Pulmonary embolism 02/11/2022    Stroke 02/11/2022    Thyroid disease 02/11/2022         Past Surgical History:   Procedure Laterality Date    CRANIOTOMY FOR EXCISION OF INTRACRANIAL TUMOR Right 8/10/2018    Procedure: CRANIOTOMY, FOR INTRACRANIAL NEOPLASM EXCISION Right eyebrow incision / LAY[facility] craniotomy for tumor resection;  Surgeon: Lefty Paulson MD;  Location: [facility] OR;  Service: Neurosurgery;  Laterality: Right;    cyst removed      SURGICAL REMOVAL OF PILONIDAL CYST         Review of Systems   Constitutional: Negative for chills, fatigue, fever and unexpected weight change.   HENT: Negative for dental problem, hearing loss, postnasal drip, rhinorrhea, sore throat, tinnitus and trouble swallowing.    Eyes: Negative for photophobia, pain, discharge and visual disturbance.   Respiratory: Negative for apnea, cough, chest tightness, shortness of breath and wheezing.         STOP BANG risk factors:  Snoring    Male sex   Cardiovascular: Negative for chest pain, palpitations and leg swelling.   Gastrointestinal: Negative for abdominal pain, blood in stool, constipation, nausea and vomiting.        Denies Fatty liver, Hepatitis   Endocrine: Negative for cold intolerance, heat intolerance, polydipsia, polyphagia and polyuria.  "  Genitourinary: Negative for decreased urine volume, difficulty urinating, dysuria, frequency, hematuria and urgency.   Musculoskeletal: Negative for arthralgias, back pain, neck pain and neck stiffness.   Skin: Negative for rash and wound.   Allergic/Immunologic: Negative for immunocompromised state.   Neurological: Positive for seizures. Negative for dizziness, tremors, syncope, weakness, numbness and headaches.   Hematological: Negative for adenopathy. Does not bruise/bleed easily.   Psychiatric/Behavioral: Negative for confusion, hallucinations, sleep disturbance and suicidal ideas.              VITALS  Visit Vitals  BP (!) 138/97 (BP Location: Right arm, Patient Position: Sitting)   Pulse 77   Temp 97.9 °F (36.6 °C) (Oral)   Ht 5' 6" (1.676 m)   Wt 72.1 kg (159 lb)   SpO2 98%   BMI 25.66 kg/m²          Physical Exam  Vitals reviewed.   Constitutional:       General: He is not in acute distress.     Appearance: He is well-developed.   HENT:      Head: Normocephalic.      Nose: Nose normal.      Mouth/Throat:      Pharynx: No oropharyngeal exudate.   Eyes:      General:         Right eye: No discharge.         Left eye: No discharge.      Conjunctiva/sclera: Conjunctivae normal.      Pupils: Pupils are equal, round, and reactive to light.   Neck:      Thyroid: No thyromegaly.      Vascular: No carotid bruit or JVD.      Trachea: No tracheal deviation.   Cardiovascular:      Rate and Rhythm: Normal rate and regular rhythm.      Pulses:           Carotid pulses are 2+ on the right side and 2+ on the left side.       Dorsalis pedis pulses are 2+ on the right side and 2+ on the left side.        Posterior tibial pulses are 2+ on the right side and 2+ on the left side.      Heart sounds: Normal heart sounds. No murmur heard.      Pulmonary:      Effort: Pulmonary effort is normal. No respiratory distress.      Breath sounds: Normal breath sounds. No stridor. No wheezing, rhonchi or rales.   Abdominal:      General: " Bowel sounds are normal. There is no distension.      Palpations: Abdomen is soft.      Tenderness: There is no abdominal tenderness. There is no guarding.   Musculoskeletal:      Cervical back: Normal range of motion.      Right lower leg: No edema.      Left lower leg: No edema.   Lymphadenopathy:      Cervical: No cervical adenopathy.   Skin:     General: Skin is warm and dry.      Capillary Refill: Capillary refill takes less than 2 seconds.      Findings: No erythema or rash.   Neurological:      Mental Status: He is alert and oriented to person, place, and time.      Coordination: Coordination normal.   Psychiatric:         Mood and Affect: Mood is anxious.      Comments: mild tremor to left hand while checking for asterixis          Significant Labs:  Lab Results   Component Value Date    WBC 6.39 02/11/2022    HGB 15.2 02/11/2022    HCT 45.9 02/11/2022     02/11/2022    ALT 29 03/05/2021    AST 25 03/05/2021     03/05/2021    K 5.1 03/05/2021     03/05/2021    CREATININE 1.0 03/05/2021    BUN 9 03/05/2021    CO2 27 03/05/2021    INR 1.0 08/09/2018       Diagnostic Studies: No relevant studies.    EKG:   Results for orders placed or performed during the hospital encounter of 05/29/20   EKG 12-lead    Collection Time: 05/29/20  1:35 PM    Narrative    Test Reason : R56.9,    Vent. Rate : 094 BPM     Atrial Rate : 094 BPM     P-R Int : 140 ms          QRS Dur : 100 ms      QT Int : 372 ms       P-R-T Axes : 055 029 053 degrees     QTc Int : 465 ms    Normal sinus rhythm  Normal ECG  No previous ECGs available  Confirmed by Terrance Costello MD [id number]) on 6/1/2020 8:30:25 AM    Referred By: [email or web address]   SELF           Confirmed By:Terrance Costello MD             Active Cardiac Conditions: None      Revised Cardiac Risk Index   High -Risk Surgery  Intraperitoneal; Intrathoracic; suprainguinal vascular Yes- + 1 No- 0   History of Ischemic Heart Disease   (Hx of MI/positive exercise test/current chest  pain due to ischemia/use of nitrate therapy/EKG with pathological Q waves) Yes- + 1 No- 0   History of CHF  (Pulmonary edema/bilateral rales or S3 gallop/PND/CXR showing pulmonary vascular redistribution) Yes- + 1 No- 0   History of CVA   (Prior stroke or TIA) Yes- + 1 No- 0   Pre-operative treatment with insulin Yes- + 1 No- 0   Pre-operative creatinine > 2mg/dl Yes- + 1 No- 0   Total: 0      Risk Status:  Estimated risk of cardiac complications after non-cardiac surgery using the Revised Cardiac Risk Index for Preoperative risk is 3.9 %      ARISCAT (Canet) risk index: Low: 1.6% risk of post-op pulmonary complications.    American Society of Anesthesiologists Physical Status classification (ASA): 3    [facility] respiratory failure index: 1.8 %           No further cardiac workup needed prior to surgery.                   Assessment/Plan:     Localization-related (focal) (partial) symptomatic epilepsy and epileptic syndromes with complex partial seizures, not intractable, without status epilepticus  Type of seizure: Absence/Petit mal. Started in 2018, attributed to astrocytoma.     Last seizure: 2 years ago.  Denies auras.  Currently being treated with Keppra 750 mg daily- will take AM of surgery  .  Managed by Neurology.    Recommend avoidance of Tramadol since it can lower the seizure threshold.     Ataxia  Resolved- no longer having problems with balance    Astrocytoma  Scheduled with Dr. Van on 2/17/22 for Craniotomy.    Tobacco use  Quit in 2018       Snoring  Denies RUFINA. Possible sleep apnea: recommend caution with sedating medication in the perioperative period.   Not interested in sleep study.    Alcohol consumption of more than four drinks per day  Drinks 6 pack of beer daily; will cut down gradually every day  in preparation for surgery  Patient denies alcohol abuse; ALT is mildly elevated today- 63 U/L.   Audit-C score: 10 points; alcohol misuse and possible liver damage  At increased risk for alcohol  withdrawal during the perioperative period.     Elevated BP without diagnosis of hypertension  /97 and 139/101 today. Denies history of HTN. Reports being anxious due to surgery and preop visit.   Clinic readings since 2020: Systolic range (103-148) and Diastolic range (68-92)  Denies headaches/urine volume changes/dizziness/syncope/vision changes.  Recommend follow-up with PCP for monitoring of BP.  Patient will recheck BP by mother's house on 2/14/22 and call/e-mail with results.        Discussion/Management of Perioperative Care    Thromboembolic prophylaxis (VTE) Care: Risk factors for thrombosis include: surgical procedure.  I recommend prophylaxis of thromboembolism with the use of compression stockings/pneumatic devices, and/or pharmacologic agents. The benefits should outweigh the risks for pharmacologic prophylaxis in the perioperative period. I also encourage early ambulation if not contraindicated during the post-operative period.    Risk factors for post-operative pulmonary complications include:surgery > 3 hours. To reduce the risk of pulmonary complications, prophylactic recommendations include: incentive spirometry use/deep breathing, end tidal carbon dioxide monitoring and pain control.    To reduce the risk of postoperative renal complications, I recommend the patient maintain adequate fluid volume status by drinking 2 liters of water daily.  Avoid/reduce NSAIDS and RUFFIN-2 inhibitors use as well as IV contrast for renal protection.    I recommend the use of appropriate prophylactic antibiotics to reduce the risk of surgical site infections.      This visit was focused on Preoperative evaluation, Perioperative Medical management, complication reduction plans. I suggest that the patient follows up with primary care or relevant sub specialists for ongoing health care.    I appreciate the opportunity to be involved in this patients care. Please feel free to contact me if there were any questions  about this consultation.        Left voicemail to discuss elevated ALT- likely related to alcohol consumption.     2/15/22: Home BP reading is 134/88- acceptable for surgery.     Patient is optimized for surgery.    Ramin Etienne NP  Perioperative Medicine  [facility]

## 2022-02-11 NOTE — OUTPATIENT SUBJECTIVE & OBJECTIVE
Outpatient Subjective & Objective      Chief Complaint: Preoperative evaulation, perioperative medical management, and complication reduction plan.     Functional Capacity: Very active as a del real; plays baseball and rides skateboard- without CP/SOB.       Anesthesia issues: None    Difficulty mouth opening: No    Steroid use in the last 12 months:  No    Dental Issues: No    Family anesthesia difficulty: None           Family Hx of Thrombosis: None    Past Medical History:   Diagnosis Date    Cancer     brain tumor    Epilepsy          Past Medical History Pertinent Negatives:   Diagnosis Date Noted    Anemia 02/11/2022    Anxiety 02/11/2022    Asthma 02/11/2022    CHF (congestive heart failure) 02/11/2022    COPD (chronic obstructive pulmonary disease) 02/11/2022    Coronary artery disease 02/11/2022    Deep vein thrombosis 02/11/2022    Depression 02/11/2022    Diabetes mellitus, type 2 02/11/2022    Disorder of kidney and ureter 07/06/2020    Emphysema of lung 07/06/2020    GERD (gastroesophageal reflux disease) 02/11/2022    Hyperlipidemia 02/11/2022    Hypertension 02/11/2022    Myocardial infarction 02/11/2022    Pulmonary embolism 02/11/2022    Stroke 02/11/2022    Thyroid disease 02/11/2022         Past Surgical History:   Procedure Laterality Date    CRANIOTOMY FOR EXCISION OF INTRACRANIAL TUMOR Right 8/10/2018    Procedure: CRANIOTOMY, FOR INTRACRANIAL NEOPLASM EXCISION Right eyebrow incision / [facility] craniotomy for tumor resection;  Surgeon: Lefty Paulson MD;  Location: [facility];  Service: Neurosurgery;  Laterality: Right;    cyst removed      SURGICAL REMOVAL OF PILONIDAL CYST         Review of Systems   Constitutional: Negative for chills, fatigue, fever and unexpected weight change.   HENT: Negative for dental problem, hearing loss, postnasal drip, rhinorrhea, sore throat, tinnitus and trouble swallowing.    Eyes: Negative for photophobia, pain, discharge and  "visual disturbance.   Respiratory: Negative for apnea, cough, chest tightness, shortness of breath and wheezing.         STOP BANG risk factors:  Snoring    Male sex   Cardiovascular: Negative for chest pain, palpitations and leg swelling.   Gastrointestinal: Negative for abdominal pain, blood in stool, constipation, nausea and vomiting.        Denies Fatty liver, Hepatitis   Endocrine: Negative for cold intolerance, heat intolerance, polydipsia, polyphagia and polyuria.   Genitourinary: Negative for decreased urine volume, difficulty urinating, dysuria, frequency, hematuria and urgency.   Musculoskeletal: Negative for arthralgias, back pain, neck pain and neck stiffness.   Skin: Negative for rash and wound.   Allergic/Immunologic: Negative for immunocompromised state.   Neurological: Positive for seizures. Negative for dizziness, tremors, syncope, weakness, numbness and headaches.   Hematological: Negative for adenopathy. Does not bruise/bleed easily.   Psychiatric/Behavioral: Negative for confusion, hallucinations, sleep disturbance and suicidal ideas.              VITALS  Visit Vitals  BP (!) 138/97 (BP Location: Right arm, Patient Position: Sitting)   Pulse 77   Temp 97.9 °F (36.6 °C) (Oral)   Ht 5' 6" (1.676 m)   Wt 72.1 kg (159 lb)   SpO2 98%   BMI 25.66 kg/m²          Physical Exam  Vitals reviewed.   Constitutional:       General: He is not in acute distress.     Appearance: He is well-developed.   HENT:      Head: Normocephalic.      Nose: Nose normal.      Mouth/Throat:      Pharynx: No oropharyngeal exudate.   Eyes:      General:         Right eye: No discharge.         Left eye: No discharge.      Conjunctiva/sclera: Conjunctivae normal.      Pupils: Pupils are equal, round, and reactive to light.   Neck:      Thyroid: No thyromegaly.      Vascular: No carotid bruit or JVD.      Trachea: No tracheal deviation.   Cardiovascular:      Rate and Rhythm: Normal rate and regular rhythm.      Pulses:           " Carotid pulses are 2+ on the right side and 2+ on the left side.       Dorsalis pedis pulses are 2+ on the right side and 2+ on the left side.        Posterior tibial pulses are 2+ on the right side and 2+ on the left side.      Heart sounds: Normal heart sounds. No murmur heard.      Pulmonary:      Effort: Pulmonary effort is normal. No respiratory distress.      Breath sounds: Normal breath sounds. No stridor. No wheezing, rhonchi or rales.   Abdominal:      General: Bowel sounds are normal. There is no distension.      Palpations: Abdomen is soft.      Tenderness: There is no abdominal tenderness. There is no guarding.   Musculoskeletal:      Cervical back: Normal range of motion.      Right lower leg: No edema.      Left lower leg: No edema.   Lymphadenopathy:      Cervical: No cervical adenopathy.   Skin:     General: Skin is warm and dry.      Capillary Refill: Capillary refill takes less than 2 seconds.      Findings: No erythema or rash.   Neurological:      Mental Status: He is alert and oriented to person, place, and time.      Coordination: Coordination normal.   Psychiatric:         Mood and Affect: Mood is anxious.      Comments: mild tremor to left hand while checking for asterixis          Significant Labs:  Lab Results   Component Value Date    WBC 6.39 02/11/2022    HGB 15.2 02/11/2022    HCT 45.9 02/11/2022     02/11/2022    ALT 29 03/05/2021    AST 25 03/05/2021     03/05/2021    K 5.1 03/05/2021     03/05/2021    CREATININE 1.0 03/05/2021    BUN 9 03/05/2021    CO2 27 03/05/2021    INR 1.0 08/09/2018       Diagnostic Studies: No relevant studies.    EKG:   Results for orders placed or performed during the hospital encounter of 05/29/20   EKG 12-lead    Collection Time: 05/29/20  1:35 PM    Narrative    Test Reason : R56.9,    Vent. Rate : 094 BPM     Atrial Rate : 094 BPM     P-R Int : 140 ms          QRS Dur : 100 ms      QT Int : 372 ms       P-R-T Axes : 055 029 053  degrees     QTc Int : 465 ms    Normal sinus rhythm  Normal ECG  No previous ECGs available  Confirmed by Terrance Costello MD ([id number]) on 6/1/2020 8:30:25 AM    Referred By: [email or web address]   SELF           Confirmed By:Terrance Costello MD             Active Cardiac Conditions: None      Revised Cardiac Risk Index   High -Risk Surgery  Intraperitoneal; Intrathoracic; suprainguinal vascular Yes- + 1 No- 0   History of Ischemic Heart Disease   (Hx of MI/positive exercise test/current chest pain due to ischemia/use of nitrate therapy/EKG with pathological Q waves) Yes- + 1 No- 0   History of CHF  (Pulmonary edema/bilateral rales or S3 gallop/PND/CXR showing pulmonary vascular redistribution) Yes- + 1 No- 0   History of CVA   (Prior stroke or TIA) Yes- + 1 No- 0   Pre-operative treatment with insulin Yes- + 1 No- 0   Pre-operative creatinine > 2mg/dl Yes- + 1 No- 0   Total: 0      Risk Status:  Estimated risk of cardiac complications after non-cardiac surgery using the Revised Cardiac Risk Index for Preoperative risk is 3.9 %      ARISCAT (Canet) risk index: Low: 1.6% risk of post-op pulmonary complications.    American Society of Anesthesiologists Physical Status classification (ASA): 3    [facility] respiratory failure index: 1.8 %           No further cardiac workup needed prior to surgery.    Outpatient Subjective & Objective

## 2022-02-11 NOTE — ASSESSMENT & PLAN NOTE
Denies RUFINA. Possible sleep apnea: recommend caution with sedating medication in the perioperative period.   Not interested in sleep study.

## 2022-02-11 NOTE — ASSESSMENT & PLAN NOTE
Drinks 6 pack of beer daily; will cut down gradually every day  in preparation for surgery  Patient denies alcohol abuse; ALT is mildly elevated today- 63 U/L.   Audit-C score: 10 points; alcohol misuse and possible liver damage  At increased risk for alcohol withdrawal during the perioperative period.

## 2022-02-11 NOTE — ASSESSMENT & PLAN NOTE
Type of seizure: Absence/Petit mal. Started in 2018, attributed to astrocytoma.     Last seizure: 2 years ago.  Denies auras.  Currently being treated with Keppra 750 mg daily- will take AM of surgery  .  Managed by Neurology.    Recommend avoidance of Tramadol since it can lower the seizure threshold.

## 2022-02-16 ENCOUNTER — PATIENT MESSAGE (OUTPATIENT)
Dept: SURGERY | Facility: HOSPITAL | Age: 37
End: 2022-02-16
Payer: COMMERCIAL

## 2022-02-16 ENCOUNTER — ANESTHESIA EVENT (OUTPATIENT)
Dept: SURGERY | Facility: HOSPITAL | Age: 37
DRG: 027 | End: 2022-02-16
Payer: COMMERCIAL

## 2022-02-16 ENCOUNTER — TELEPHONE (OUTPATIENT)
Dept: NEUROSURGERY | Facility: CLINIC | Age: 37
End: 2022-02-16
Payer: COMMERCIAL

## 2022-02-16 NOTE — ANESTHESIA PREPROCEDURE EVALUATION
[facility]  Anesthesia Pre-Operative Evaluation         Patient Name: Дмитрий Winn  YOB: 1985  MRN: [id number]    SUBJECTIVE:     Pre-operative evaluation for Procedure(s) (LRB):  CRANIOTOMY, USING FRAMELESS STEREOTAXY Open (Right)     02/16/2022    Дмитрий Winn is a 36 y.o. 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     MRI 1/29/22  -mild increase in size of the right frontal lesion when compared to the prior study with mild increased mass effect        Patient Active Problem List   Diagnosis    Right frontal lobe mass    Observed seizure-like activity    Localization-related (focal) (partial) symptomatic epilepsy and epileptic syndromes with complex partial seizures, not intractable, without status epilepticus    Astrocytoma    Snoring    Alcohol consumption of more than four drinks per day    Elevated BP without diagnosis of hypertension       Review of patient's allergies indicates:  No Known Allergies    Current Medications:  Current Outpatient Medications   Medication Instructions    indomethacin (INDOCIN) 50 mg, Oral, 2 times daily with meals    levETIRAcetam (KEPPRA) 750 mg, Oral, 2 times daily       Past Surgical History:   Procedure Laterality Date    CRANIOTOMY FOR EXCISION OF INTRACRANIAL TUMOR Right 8/10/2018    Procedure: CRANIOTOMY, FOR INTRACRANIAL NEOPLASM EXCISION Right eyebrow incision / LAYNE BrainPath craniotomy for tumor resection;  Surgeon: Lefty Paulson MD;  Location: [facility] OR;  Service: Neurosurgery;  Laterality: Right;    cyst removed      SURGICAL REMOVAL OF PILONIDAL CYST       Social History   -Former smoker--quit in 2010   -Alcoholism--42 beers per week    OBJECTIVE:     Vital Signs Range (Last 24H):         Significant Labs:  Lab Results   Component Value Date    WBC 6.39 02/11/2022    HGB 15.2 02/11/2022    HCT 45.9 02/11/2022    PLT  298 02/11/2022    ALT 63 (H) 02/11/2022    AST 34 02/11/2022     02/11/2022    K 4.6 02/11/2022     02/11/2022    CREATININE 0.9 02/11/2022    BUN 9 02/11/2022    CO2 26 02/11/2022    INR 1.0 02/11/2022       EKG:   Results for orders placed or performed during the hospital encounter of 05/29/20   EKG 12-lead    Collection Time: 05/29/20  1:35 PM    Narrative    Test Reason : R56.9,    Vent. Rate : 094 BPM     Atrial Rate : 094 BPM     P-R Int : 140 ms          QRS Dur : 100 ms      QT Int : 372 ms       P-R-T Axes : 055 029 053 degrees     QTc Int : 465 ms    Normal sinus rhythm  Normal ECG  No previous ECGs available  Confirmed by Terrance Costello MD ([id number]) on 6/1/2020 8:30:25 AM    Referred By: [email or web address]   SELF           Confirmed By:Terrance Costello MD       ASSESSMENT/PLAN:         Anesthesia Evaluation    I have reviewed the Patient Summary Reports.    I have reviewed the Nursing Notes. I have reviewed the NPO Status.   I have reviewed the Medications.     Review of Systems  Anesthesia Hx:  No problems with previous Anesthesia  History of prior surgery of interest to airway management or planning: Previous anesthesia: General  Denies Personal Hx of Anesthesia complications.   Social:  Alcohol Use, Former Smoker    EENT/Dental:EENT/Dental Normal   Cardiovascular:  Cardiovascular Normal     Pulmonary:  Pulmonary Normal    Renal/:  Renal/ Normal     Hepatic/GI:  Hepatic/GI Normal    Neurological:   Seizures S/p crani With astrocytoma    Increased size on MRI compared to previous   Endocrine:  Endocrine Normal        Physical Exam  General:  Well nourished    Airway/Jaw/Neck:  Airway Findings: Mouth Opening: Normal Tongue: Normal  General Airway Assessment: Adult  Mallampati: I  TM Distance: Normal, at least 6 cm  Jaw/Neck Findings:  Neck ROM: Normal ROM      Dental:  Dental Findings: In tact   Chest/Lungs:  Chest/Lungs Findings: Clear to auscultation     Heart/Vascular:  Heart Findings: Rate: Normal   Rhythm: Regular Rhythm  Sounds: Normal     Abdomen:  Abdomen Findings: Normal    Musculoskeletal:  Musculoskeletal Findings: Normal    Mental Status:  Mental Status Findings:  Alert and Oriented, Cooperative         Anesthesia Plan  Type of Anesthesia, risks & benefits discussed:  Anesthesia Type:  general    Patient's Preference:   Plan Factors:          Intra-op Monitoring Plan: arterial line  Intra-op Monitoring Plan Comments:   Post Op Pain Control Plan: IV/PO Opioids PRN, multimodal analgesia and per primary service following discharge from PACU  Post Op Pain Control Plan Comments:     Induction:   IV  Beta Blocker:  Patient is not currently on a Beta-Blocker (No further documentation required).       Informed Consent: Patient understands risks and agrees with Anesthesia plan.  Questions answered. Anesthesia consent signed with patient.  ASA Score: 3     Day of Surgery Review of History & Physical:    H&P update referred to the surgeon.         Ready For Surgery From Anesthesia Perspective.

## 2022-02-16 NOTE — TELEPHONE ENCOUNTER
Spoke with pt and gave him his time of arrival and location of his surgery tomorrow. Pt was informed of pre op instructions and has verbalized his understanding . Pt is encouraged to contact clinic should he have any questions or concerns.

## 2022-02-17 ENCOUNTER — HOSPITAL ENCOUNTER (INPATIENT)
Facility: HOSPITAL | Age: 37
LOS: 2 days | Discharge: HOME | DRG: 027 | End: 2022-02-19
Attending: NEUROLOGICAL SURGERY | Admitting: NEUROLOGICAL SURGERY
Payer: COMMERCIAL

## 2022-02-17 ENCOUNTER — ANESTHESIA (OUTPATIENT)
Dept: SURGERY | Facility: HOSPITAL | Age: 37
DRG: 027 | End: 2022-02-17
Payer: COMMERCIAL

## 2022-02-17 DIAGNOSIS — Z01.818 PREOPERATIVE TESTING: ICD-10-CM

## 2022-02-17 DIAGNOSIS — D49.6 BRAIN TUMOR: ICD-10-CM

## 2022-02-17 DIAGNOSIS — C71.9 ASTROCYTOMA: ICD-10-CM

## 2022-02-17 DIAGNOSIS — G93.89 RIGHT FRONTAL LOBE MASS: Primary | ICD-10-CM

## 2022-02-17 LAB
ABO + RH BLD: NORMAL
BLD GP AB SCN CELLS X3 SERPL QL: NORMAL
GLUCOSE SERPL-MCNC: 118 MG/DL (ref 70–110)
GLUCOSE SERPL-MCNC: 129 MG/DL (ref 70–110)
HCO3 UR-SCNC: 21 MMOL/L (ref 24–28)
HCO3 UR-SCNC: 23.8 MMOL/L (ref 24–28)
HCT VFR BLD CALC: 33 %PCV (ref 36–54)
HCT VFR BLD CALC: 34 %PCV (ref 36–54)
PCO2 BLDA: 27.2 MMHG (ref 35–45)
PCO2 BLDA: 35.2 MMHG (ref 35–45)
PH SMN: 7.44 [PH] (ref 7.35–7.45)
PH SMN: 7.5 [PH] (ref 7.35–7.45)
PO2 BLDA: 193 MMHG (ref 80–100)
PO2 BLDA: 415 MMHG (ref 80–100)
POC BE: -2 MMOL/L
POC BE: 0 MMOL/L
POC IONIZED CALCIUM: 1.12 MMOL/L (ref 1.06–1.42)
POC IONIZED CALCIUM: 1.16 MMOL/L (ref 1.06–1.42)
POC SATURATED O2: 100 % (ref 95–100)
POC SATURATED O2: 100 % (ref 95–100)
POC TCO2: 22 MMOL/L (ref 23–27)
POC TCO2: 25 MMOL/L (ref 23–27)
POCT GLUCOSE: 103 MG/DL (ref 70–110)
POTASSIUM BLD-SCNC: 3.8 MMOL/L (ref 3.5–5.1)
POTASSIUM BLD-SCNC: 4 MMOL/L (ref 3.5–5.1)
SAMPLE: ABNORMAL
SAMPLE: ABNORMAL
SODIUM BLD-SCNC: 140 MMOL/L (ref 136–145)
SODIUM BLD-SCNC: 142 MMOL/L (ref 136–145)

## 2022-02-17 PROCEDURE — 81277 CYTOGENOMIC NEO MICRORA ALYS: CPT | Performed by: STUDENT IN AN ORGANIZED HEALTH CARE EDUCATION/TRAINING PROGRAM

## 2022-02-17 PROCEDURE — 25000003 PHARM REV CODE 250: Performed by: NEUROLOGICAL SURGERY

## 2022-02-17 PROCEDURE — 37000009 HC ANESTHESIA EA ADD 15 MINS: Performed by: NEUROLOGICAL SURGERY

## 2022-02-17 PROCEDURE — 61510 PR EXCIS SUPRATENT BRAIN TUMOR: ICD-10-PCS | Mod: ,,, | Performed by: NEUROLOGICAL SURGERY

## 2022-02-17 PROCEDURE — 63600175 PHARM REV CODE 636 W HCPCS: Performed by: STUDENT IN AN ORGANIZED HEALTH CARE EDUCATION/TRAINING PROGRAM

## 2022-02-17 PROCEDURE — 88342 CHG IMMUNOCYTOCHEMISTRY: ICD-10-PCS | Mod: 26,,, | Performed by: STUDENT IN AN ORGANIZED HEALTH CARE EDUCATION/TRAINING PROGRAM

## 2022-02-17 PROCEDURE — 88342 IMHCHEM/IMCYTCHM 1ST ANTB: CPT | Performed by: STUDENT IN AN ORGANIZED HEALTH CARE EDUCATION/TRAINING PROGRAM

## 2022-02-17 PROCEDURE — 88341 IMHCHEM/IMCYTCHM EA ADD ANTB: CPT | Mod: 26,,, | Performed by: STUDENT IN AN ORGANIZED HEALTH CARE EDUCATION/TRAINING PROGRAM

## 2022-02-17 PROCEDURE — 69990 PR MICROSURG TECHNIQUES,REQ OPER MICROSCOPE: ICD-10-PCS | Mod: 59,,, | Performed by: NEUROLOGICAL SURGERY

## 2022-02-17 PROCEDURE — 99223 1ST HOSP IP/OBS HIGH 75: CPT | Mod: ,,, | Performed by: NURSE PRACTITIONER

## 2022-02-17 PROCEDURE — 81455 SO/HL 51/>GSAP DNA/DNA&RNA: CPT | Performed by: STUDENT IN AN ORGANIZED HEALTH CARE EDUCATION/TRAINING PROGRAM

## 2022-02-17 PROCEDURE — 36620 ARTERIAL: ICD-10-PCS | Mod: 59,,, | Performed by: ANESTHESIOLOGY

## 2022-02-17 PROCEDURE — D9220A PRA ANESTHESIA: ICD-10-PCS | Mod: ,,, | Performed by: ANESTHESIOLOGY

## 2022-02-17 PROCEDURE — 88331 PATH CONSLTJ SURG 1 BLK 1SPC: CPT | Mod: 26,,, | Performed by: STUDENT IN AN ORGANIZED HEALTH CARE EDUCATION/TRAINING PROGRAM

## 2022-02-17 PROCEDURE — 36000713 HC OR TIME LEV V EA ADD 15 MIN: Performed by: NEUROLOGICAL SURGERY

## 2022-02-17 PROCEDURE — 25000003 PHARM REV CODE 250: Performed by: STUDENT IN AN ORGANIZED HEALTH CARE EDUCATION/TRAINING PROGRAM

## 2022-02-17 PROCEDURE — 88307 TISSUE EXAM BY PATHOLOGIST: CPT | Mod: 26,,, | Performed by: STUDENT IN AN ORGANIZED HEALTH CARE EDUCATION/TRAINING PROGRAM

## 2022-02-17 PROCEDURE — 61781 PR STEREOTACTIC COMP ASSIST PROC,CRANIAL,INTRADURAL: ICD-10-PCS | Mod: ,,, | Performed by: NEUROLOGICAL SURGERY

## 2022-02-17 PROCEDURE — 88307 PR  SURG PATH,LEVEL V: ICD-10-PCS | Mod: 26,,, | Performed by: STUDENT IN AN ORGANIZED HEALTH CARE EDUCATION/TRAINING PROGRAM

## 2022-02-17 PROCEDURE — 88342 IMHCHEM/IMCYTCHM 1ST ANTB: CPT | Mod: 91 | Performed by: STUDENT IN AN ORGANIZED HEALTH CARE EDUCATION/TRAINING PROGRAM

## 2022-02-17 PROCEDURE — 27201423 OPTIME MED/SURG SUP & DEVICES STERILE SUPPLY: Performed by: NEUROLOGICAL SURGERY

## 2022-02-17 PROCEDURE — 88331 PR  PATH CONSULT IN SURG,W FRZ SEC: ICD-10-PCS | Mod: 26,,, | Performed by: STUDENT IN AN ORGANIZED HEALTH CARE EDUCATION/TRAINING PROGRAM

## 2022-02-17 PROCEDURE — 63600175 PHARM REV CODE 636 W HCPCS: Performed by: NEUROLOGICAL SURGERY

## 2022-02-17 PROCEDURE — 88341 IMHCHEM/IMCYTCHM EA ADD ANTB: CPT | Mod: 59 | Performed by: STUDENT IN AN ORGANIZED HEALTH CARE EDUCATION/TRAINING PROGRAM

## 2022-02-17 PROCEDURE — 88307 TISSUE EXAM BY PATHOLOGIST: CPT | Performed by: STUDENT IN AN ORGANIZED HEALTH CARE EDUCATION/TRAINING PROGRAM

## 2022-02-17 PROCEDURE — 61510 CRNEC TREPH EXC BRN TUM STTL: CPT | Mod: ,,, | Performed by: NEUROLOGICAL SURGERY

## 2022-02-17 PROCEDURE — 25000003 PHARM REV CODE 250: Performed by: NURSE PRACTITIONER

## 2022-02-17 PROCEDURE — 88341 PR IHC OR ICC EACH ADD'L SINGLE ANTIBODY  STAINPR: ICD-10-PCS | Mod: 26,,, | Performed by: STUDENT IN AN ORGANIZED HEALTH CARE EDUCATION/TRAINING PROGRAM

## 2022-02-17 PROCEDURE — 20000000 HC ICU ROOM

## 2022-02-17 PROCEDURE — 88381 MICRODISSECTION MANUAL: CPT | Performed by: STUDENT IN AN ORGANIZED HEALTH CARE EDUCATION/TRAINING PROGRAM

## 2022-02-17 PROCEDURE — 88331 PATH CONSLTJ SURG 1 BLK 1SPC: CPT | Performed by: STUDENT IN AN ORGANIZED HEALTH CARE EDUCATION/TRAINING PROGRAM

## 2022-02-17 PROCEDURE — 86901 BLOOD TYPING SEROLOGIC RH(D): CPT | Performed by: STUDENT IN AN ORGANIZED HEALTH CARE EDUCATION/TRAINING PROGRAM

## 2022-02-17 PROCEDURE — C1713 ANCHOR/SCREW BN/BN,TIS/BN: HCPCS | Performed by: NEUROLOGICAL SURGERY

## 2022-02-17 PROCEDURE — 36000712 HC OR TIME LEV V 1ST 15 MIN: Performed by: NEUROLOGICAL SURGERY

## 2022-02-17 PROCEDURE — 37000008 HC ANESTHESIA 1ST 15 MINUTES: Performed by: NEUROLOGICAL SURGERY

## 2022-02-17 PROCEDURE — 27201037 HC PRESSURE MONITORING SET UP

## 2022-02-17 PROCEDURE — 69990 MICROSURGERY ADD-ON: CPT | Mod: 59,,, | Performed by: NEUROLOGICAL SURGERY

## 2022-02-17 PROCEDURE — 86920 COMPATIBILITY TEST SPIN: CPT | Performed by: NEUROLOGICAL SURGERY

## 2022-02-17 PROCEDURE — D9220A PRA ANESTHESIA: Mod: ,,, | Performed by: ANESTHESIOLOGY

## 2022-02-17 PROCEDURE — A9585 GADOBUTROL INJECTION: HCPCS | Performed by: NEUROLOGICAL SURGERY

## 2022-02-17 PROCEDURE — 61781 SCAN PROC CRANIAL INTRA: CPT | Mod: ,,, | Performed by: NEUROLOGICAL SURGERY

## 2022-02-17 PROCEDURE — 99223 PR INITIAL HOSPITAL CARE,LEVL III: ICD-10-PCS | Mod: ,,, | Performed by: NURSE PRACTITIONER

## 2022-02-17 PROCEDURE — 88342 IMHCHEM/IMCYTCHM 1ST ANTB: CPT | Mod: 26,,, | Performed by: STUDENT IN AN ORGANIZED HEALTH CARE EDUCATION/TRAINING PROGRAM

## 2022-02-17 PROCEDURE — C1762 CONN TISS, HUMAN(INC FASCIA): HCPCS | Performed by: NEUROLOGICAL SURGERY

## 2022-02-17 PROCEDURE — 36620 INSERTION CATHETER ARTERY: CPT | Mod: 59,,, | Performed by: ANESTHESIOLOGY

## 2022-02-17 PROCEDURE — 25500020 PHARM REV CODE 255: Performed by: NEUROLOGICAL SURGERY

## 2022-02-17 PROCEDURE — 94761 N-INVAS EAR/PLS OXIMETRY MLT: CPT

## 2022-02-17 PROCEDURE — 86920 COMPATIBILITY TEST SPIN: CPT | Performed by: ANESTHESIOLOGY

## 2022-02-17 DEVICE — SCREW UN3 AXS SD 1.5X4MM: Type: IMPLANTABLE DEVICE | Site: CRANIAL | Status: FUNCTIONAL

## 2022-02-17 DEVICE — PLATE BONE BUR HOLE COVER 10MM: Type: IMPLANTABLE DEVICE | Site: CRANIAL | Status: FUNCTIONAL

## 2022-02-17 DEVICE — DURA MATRIX ONLAY PLUS 2X2: Type: IMPLANTABLE DEVICE | Site: CRANIAL | Status: FUNCTIONAL

## 2022-02-17 DEVICE — PLATE BONE 2X2 HOLE SM BOX: Type: IMPLANTABLE DEVICE | Site: CRANIAL | Status: FUNCTIONAL

## 2022-02-17 RX ORDER — LEVETIRACETAM 750 MG/1
750 TABLET ORAL 2 TIMES DAILY
Status: DISCONTINUED | OUTPATIENT
Start: 2022-02-17 | End: 2022-02-19 | Stop reason: HOSPADM

## 2022-02-17 RX ORDER — PROCHLORPERAZINE EDISYLATE 5 MG/ML
5 INJECTION INTRAMUSCULAR; INTRAVENOUS EVERY 30 MIN PRN
Status: CANCELLED | OUTPATIENT
Start: 2022-02-17

## 2022-02-17 RX ORDER — DIPHENHYDRAMINE HYDROCHLORIDE 50 MG/ML
25 INJECTION INTRAMUSCULAR; INTRAVENOUS EVERY 6 HOURS PRN
Status: CANCELLED | OUTPATIENT
Start: 2022-02-17

## 2022-02-17 RX ORDER — PHENYLEPHRINE HYDROCHLORIDE 10 MG/ML
INJECTION INTRAVENOUS
Status: DISCONTINUED | OUTPATIENT
Start: 2022-02-17 | End: 2022-02-17

## 2022-02-17 RX ORDER — LIDOCAINE HYDROCHLORIDE 10 MG/ML
1 INJECTION, SOLUTION EPIDURAL; INFILTRATION; INTRACAUDAL; PERINEURAL ONCE
Status: DISCONTINUED | OUTPATIENT
Start: 2022-02-17 | End: 2022-02-17 | Stop reason: HOSPADM

## 2022-02-17 RX ORDER — OXYCODONE AND ACETAMINOPHEN 5; 325 MG/1; MG/1
1 TABLET ORAL EVERY 4 HOURS PRN
Status: DISCONTINUED | OUTPATIENT
Start: 2022-02-17 | End: 2022-02-19 | Stop reason: HOSPADM

## 2022-02-17 RX ORDER — LIDOCAINE HYDROCHLORIDE AND EPINEPHRINE 10; 10 MG/ML; UG/ML
INJECTION, SOLUTION INFILTRATION; PERINEURAL
Status: DISCONTINUED | OUTPATIENT
Start: 2022-02-17 | End: 2022-02-17 | Stop reason: HOSPADM

## 2022-02-17 RX ORDER — LABETALOL HCL 20 MG/4 ML
10 SYRINGE (ML) INTRAVENOUS EVERY 4 HOURS PRN
Status: DISCONTINUED | OUTPATIENT
Start: 2022-02-17 | End: 2022-02-19 | Stop reason: HOSPADM

## 2022-02-17 RX ORDER — FAMOTIDINE 20 MG/1
20 TABLET, FILM COATED ORAL 2 TIMES DAILY
Status: DISCONTINUED | OUTPATIENT
Start: 2022-02-17 | End: 2022-02-19 | Stop reason: HOSPADM

## 2022-02-17 RX ORDER — ROPIVACAINE HYDROCHLORIDE 5 MG/ML
INJECTION, SOLUTION EPIDURAL; INFILTRATION; PERINEURAL
Status: DISPENSED
Start: 2022-02-17 | End: 2022-02-17

## 2022-02-17 RX ORDER — PROPOFOL 10 MG/ML
VIAL (ML) INTRAVENOUS
Status: DISCONTINUED | OUTPATIENT
Start: 2022-02-17 | End: 2022-02-17

## 2022-02-17 RX ORDER — DEXAMETHASONE SODIUM PHOSPHATE 4 MG/ML
INJECTION, SOLUTION INTRA-ARTICULAR; INTRALESIONAL; INTRAMUSCULAR; INTRAVENOUS; SOFT TISSUE
Status: DISCONTINUED | OUTPATIENT
Start: 2022-02-17 | End: 2022-02-17

## 2022-02-17 RX ORDER — ACETAMINOPHEN 325 MG/1
650 TABLET ORAL EVERY 6 HOURS PRN
Status: DISCONTINUED | OUTPATIENT
Start: 2022-02-17 | End: 2022-02-19 | Stop reason: HOSPADM

## 2022-02-17 RX ORDER — BUPIVACAINE HYDROCHLORIDE AND EPINEPHRINE 5; 5 MG/ML; UG/ML
INJECTION, SOLUTION EPIDURAL; INTRACAUDAL; PERINEURAL
Status: DISCONTINUED | OUTPATIENT
Start: 2022-02-17 | End: 2022-02-17 | Stop reason: HOSPADM

## 2022-02-17 RX ORDER — OXYCODONE HYDROCHLORIDE 5 MG/1
5 TABLET ORAL
Status: CANCELLED | OUTPATIENT
Start: 2022-02-17

## 2022-02-17 RX ORDER — LEVETIRACETAM 500 MG/5ML
INJECTION, SOLUTION, CONCENTRATE INTRAVENOUS
Status: DISCONTINUED | OUTPATIENT
Start: 2022-02-17 | End: 2022-02-17

## 2022-02-17 RX ORDER — SODIUM CHLORIDE 0.9 % (FLUSH) 0.9 %
10 SYRINGE (ML) INJECTION
Status: CANCELLED | OUTPATIENT
Start: 2022-02-17

## 2022-02-17 RX ORDER — DEXAMETHASONE SODIUM PHOSPHATE 4 MG/ML
4 INJECTION, SOLUTION INTRA-ARTICULAR; INTRALESIONAL; INTRAMUSCULAR; INTRAVENOUS; SOFT TISSUE
Status: DISCONTINUED | OUTPATIENT
Start: 2022-02-17 | End: 2022-02-19 | Stop reason: HOSPADM

## 2022-02-17 RX ORDER — MUPIROCIN 20 MG/G
1 OINTMENT TOPICAL 2 TIMES DAILY
Status: DISCONTINUED | OUTPATIENT
Start: 2022-02-17 | End: 2022-02-17 | Stop reason: HOSPADM

## 2022-02-17 RX ORDER — FENTANYL CITRATE 50 UG/ML
INJECTION, SOLUTION INTRAMUSCULAR; INTRAVENOUS
Status: DISCONTINUED | OUTPATIENT
Start: 2022-02-17 | End: 2022-02-17

## 2022-02-17 RX ORDER — BACITRACIN ZINC 500 UNIT/G
OINTMENT (GRAM) TOPICAL
Status: DISCONTINUED | OUTPATIENT
Start: 2022-02-17 | End: 2022-02-17 | Stop reason: HOSPADM

## 2022-02-17 RX ORDER — LIDOCAINE HYDROCHLORIDE 20 MG/ML
INJECTION INTRAVENOUS
Status: DISCONTINUED | OUTPATIENT
Start: 2022-02-17 | End: 2022-02-17

## 2022-02-17 RX ORDER — MUPIROCIN 20 MG/G
OINTMENT TOPICAL
Status: DISCONTINUED | OUTPATIENT
Start: 2022-02-17 | End: 2022-02-17 | Stop reason: HOSPADM

## 2022-02-17 RX ORDER — ROCURONIUM BROMIDE 10 MG/ML
INJECTION, SOLUTION INTRAVENOUS
Status: DISCONTINUED | OUTPATIENT
Start: 2022-02-17 | End: 2022-02-17

## 2022-02-17 RX ORDER — GADOBUTROL 604.72 MG/ML
8 INJECTION INTRAVENOUS
Status: COMPLETED | OUTPATIENT
Start: 2022-02-17 | End: 2022-02-17

## 2022-02-17 RX ADMIN — PROPOFOL 200 MG: 10 INJECTION, EMULSION INTRAVENOUS at 07:02

## 2022-02-17 RX ADMIN — ROCURONIUM BROMIDE 40 MG: 10 INJECTION, SOLUTION INTRAVENOUS at 07:02

## 2022-02-17 RX ADMIN — SODIUM CHLORIDE: 0.9 INJECTION, SOLUTION INTRAVENOUS at 06:02

## 2022-02-17 RX ADMIN — DEXAMETHASONE SODIUM PHOSPHATE 12 MG: 4 INJECTION, SOLUTION INTRAMUSCULAR; INTRAVENOUS at 07:02

## 2022-02-17 RX ADMIN — PHENYLEPHRINE HYDROCHLORIDE 25 MCG: 10 INJECTION INTRAVENOUS at 08:02

## 2022-02-17 RX ADMIN — FENTANYL CITRATE 100 MCG: 50 INJECTION, SOLUTION INTRAMUSCULAR; INTRAVENOUS at 07:02

## 2022-02-17 RX ADMIN — DEXAMETHASONE SODIUM PHOSPHATE 4 MG: 4 INJECTION INTRA-ARTICULAR; INTRALESIONAL; INTRAMUSCULAR; INTRAVENOUS; SOFT TISSUE at 08:02

## 2022-02-17 RX ADMIN — CEFTRIAXONE SODIUM 2 G: 1 INJECTION, SOLUTION INTRAVENOUS at 07:02

## 2022-02-17 RX ADMIN — ROCURONIUM BROMIDE 10 MG: 10 INJECTION, SOLUTION INTRAVENOUS at 09:02

## 2022-02-17 RX ADMIN — GADOBUTROL 8 ML: 604.72 INJECTION INTRAVENOUS at 03:02

## 2022-02-17 RX ADMIN — LEVETIRACETAM 750 MG: 750 TABLET ORAL at 08:02

## 2022-02-17 RX ADMIN — ROCURONIUM BROMIDE 20 MG: 10 INJECTION, SOLUTION INTRAVENOUS at 08:02

## 2022-02-17 RX ADMIN — ROCURONIUM BROMIDE 10 MG: 10 INJECTION, SOLUTION INTRAVENOUS at 07:02

## 2022-02-17 RX ADMIN — DEXAMETHASONE SODIUM PHOSPHATE 4 MG: 4 INJECTION INTRA-ARTICULAR; INTRALESIONAL; INTRAMUSCULAR; INTRAVENOUS; SOFT TISSUE at 05:02

## 2022-02-17 RX ADMIN — FENTANYL CITRATE 25 MCG: 50 INJECTION, SOLUTION INTRAMUSCULAR; INTRAVENOUS at 09:02

## 2022-02-17 RX ADMIN — LEVETIRACETAM 1000 MG: 100 INJECTION, SOLUTION, CONCENTRATE INTRAVENOUS at 07:02

## 2022-02-17 RX ADMIN — FENTANYL CITRATE 25 MCG: 50 INJECTION, SOLUTION INTRAMUSCULAR; INTRAVENOUS at 08:02

## 2022-02-17 RX ADMIN — LIDOCAINE HYDROCHLORIDE 90 MG: 20 INJECTION, SOLUTION INTRAVENOUS at 10:02

## 2022-02-17 RX ADMIN — LIDOCAINE HYDROCHLORIDE 80 MG: 20 INJECTION, SOLUTION INTRAVENOUS at 07:02

## 2022-02-17 RX ADMIN — MUPIROCIN: 20 OINTMENT TOPICAL at 05:02

## 2022-02-17 RX ADMIN — FAMOTIDINE 20 MG: 20 TABLET ORAL at 08:02

## 2022-02-17 RX ADMIN — DEXAMETHASONE SODIUM PHOSPHATE 4 MG: 4 INJECTION INTRA-ARTICULAR; INTRALESIONAL; INTRAMUSCULAR; INTRAVENOUS; SOFT TISSUE at 11:02

## 2022-02-17 RX ADMIN — SODIUM CHLORIDE, SODIUM GLUCONATE, SODIUM ACETATE, POTASSIUM CHLORIDE, MAGNESIUM CHLORIDE, SODIUM PHOSPHATE, DIBASIC, AND POTASSIUM PHOSPHATE: .53; .5; .37; .037; .03; .012; .00082 INJECTION, SOLUTION INTRAVENOUS at 07:02

## 2022-02-17 NOTE — HPI
Mr. Дмитрий Winn is a 36 y.o. 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 Patient admittd to [facility] s/p frontal mass resection. Admit for higher level of care and neuro monitoring.

## 2022-02-17 NOTE — PROGRESS NOTES
Called MRI to coordinate time for scan. Stated they are backed up and will try and get it done before shift change.   Gave MRI name and phone # for call back when ready.

## 2022-02-17 NOTE — ASSESSMENT & PLAN NOTE
S/p right frontal lobe resection    - admit to NCC  - Q1 neuro checks  - SBP< 160  - dexamethasone 4mg Q6  - Keppra 750 mg   - PT/OT/SLP  - MRI pending  - D/C angelo

## 2022-02-17 NOTE — OP NOTE
DATE OF PROCEDURE:  2/17/2022     SURGEON:  Chago Van M.D., Ph.D.     ASSISTANT FOR THIS SURGERY:  ADRIANA Abraham M.D.  (RES) (the assistant is [facility]).     PREOPERATIVE DIAGNOSES:  Astrocytoma.     POSTOPERATIVE DIAGNOSES: Astrocytoma.     PROCEDURES PERFORMED:  1.  Right frontal craniotomy for tumor.  2.  Stealth navigation.  3.  Microsurgical technique.     INDICATIONS IN DETAIL:  Mr. Дмитрий Winn is a 36 y.o. 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 At the time of our last office visit on 5/4/2021, pt had no complaints.  MRI Brain (Tumor with Perfusion) W WO Contrast (1/29/2022): There is a mild increase in size of the right frontal lesion when compared to the prior study with mild increased mass effect. No surrounding signal abnormality in the adjacent white matter is noted. Minimal enhancement within the posterosuperior portion of the lesion is questioned although this may reflect additional prominent internal vasculature. MR perfusion demonstrates mild increased signal on cerebral blood volume maps centrally within the lesion.  I have recommended craniotomy. I have discussed the risks/benefits, indications, and alternatives for the proposed procedure in detail. I have answered all of their questions and the pt wishes to proceed with surgery.          PROCEDURE IN DETAIL:    The patient was seen in the pretreatment area and the risks, benefits and alternatives were again discussed and the patient wished to proceed.  The patient was brought to the Operating Room and a general anesthetic was administered.  All proper lines were placed.  The patient was placed in the supine position with the head slightly turned towards the left.  The head was placed in 3-point fixation using [place] headholder.  The  Stealth navigation system was brought to the field and an accurate registration was achieved using the tracer function.  The tumor was well visualized.  The tumor projection of the tumor on the patient's scalp.  The patient's hairline was drawn.  We designed a curved incision that was mostly behind the hairline.  The hair was clipped using electric clippers.  The patient's head was cleaned, prepped, and draped in usual fashion.  A lidocaine/Marcaine mix was infiltrated on the skin.  Incision was made using a 10 blade and carried down through the galea using Bovie cautery.  All bleeding points were coagulated.  The skin flap was dissected forward and held in retraction using fishhooks and a Tami bar.  The patient has a large frontal sinus.  This was drawn on the patient's skull so that we could avoid it.  A bur hole was made laterally and a craniotomy flap was then turned avoiding the frontal sinus.  The wound was irrigated copiously and all bleeding points were coagulated.  The microscope was brought to the field.  There was a dural went which we used as an opening.  Under microscopic visualization we opened the dura.  Sheryl cortex could be seen.  A corticectomy over the large area of tumor was performed.  We were able to find the tumor and the junction with normal brain.  This was found on all sides of the tumor.  We then dissected this removing what appeared to be low-grade tumor.  A portion of the tumor was sent for frozen section.  This came back as astrocytoma.  We continued to dissect and remove the tumor until it was clean on all sides.  There was a portion in the middle of this tumor where there was increased vascularity.  We took great care to ensure that this was part of the permanent evaluation pathologically.  Once the tumor was removed, the wound was irrigated copiously and all bleeding points were coagulated.  Resection cavity was lined with Surgicel.  DuraGen was used to replace the dura.  The bone  flap was placed using Caitie plates and screws.  Soft tissues were then closed in layers with staples in the skin.    A clean dressing was placed.  The patient was awakened by the Anesthesia staff.  At the point the patient was awake and following commands, he was extubated.  The patient was transferred to the ICU in excellent condition.     Specimen included tumor taken for intraoperative and permanent evaluation.     EBL was approximately 100 mL.     There were no intraprocedural complications.     All counts were correct at the end of surgery.     Dr. hCago Van was present during the entire procedure.

## 2022-02-17 NOTE — ANESTHESIA PROCEDURE NOTES
Intubation    Date/Time: 2/17/2022 7:08 AM  Performed by: Girma Lazar DO  Authorized by: Ray Stone MD     Intubation:     Mask Ventilation:  Easy with oral airway    Attempts:  1    Attempted By:  Resident anesthesiologist    Method of Intubation:  Direct    Blade:  Mercer 2    Laryngeal View Grade: Grade I - full view of cords      Difficult Airway Encountered?: No      Complications:  None    Airway Device Size:  7.5    Style/Cuff Inflation:  Cuffed (inflated to minimal occlusive pressure)    Tube secured:  23    Secured at:  The teeth    Placement Verified By:  Revisualization with laryngoscopy and Capnometry    Complicating Factors:  None    Findings Post-Intubation:  Atraumatic/condition of teeth unchanged

## 2022-02-17 NOTE — PLAN OF CARE
Pt resting comfortably.    Call light in reach.    No questions or concerns at this time.    Wife at bedside. Will keep belongings

## 2022-02-17 NOTE — INTERVAL H&P NOTE
The patient has been examined and the H&P has been reviewed:    I concur with the findings and no changes have occurred since H&P was written.    Surgery risks, benefits and alternative options discussed and understood by patient/family.      -- Patient evaluated prior to surgery.  -- All diagnostics and imaging reviewed.  -- Patient NPO since midnight.  -- No anticogulant or antiplatelet medication in the last 72 hours.  -- Patient marked, consented, and all questions answered.  -- Further orders to follow s/p surgery.  -- To OR for surgery as planned.       There are no hospital problems to display for this patient.

## 2022-02-17 NOTE — H&P
[facility]  Neurocritical Care  History & Physical    Admit Date: 2/17/2022  Service Date: 02/17/2022  Length of Stay: 0    Subjective:     Chief Complaint: Astrocytoma    History of Present Illness: Mr. Дмитрий Winn is a 36 y.o. 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 Patient admittd to [facility] s/p frontal mass resection. Admit for higher level of care and neuro monitoring.      Past Medical History:   Diagnosis Date    Ataxia 5/29/2020    Cancer     brain tumor    Epilepsy      Past Surgical History:   Procedure Laterality Date    CRANIOTOMY FOR EXCISION OF INTRACRANIAL TUMOR Right 8/10/2018    Procedure: CRANIOTOMY, FOR INTRACRANIAL NEOPLASM EXCISION Right eyebrow incision / LAYNE BrainPath craniotomy for tumor resection;  Surgeon: Lefty Paulson MD;  Location: [facility] OR;  Service: Neurosurgery;  Laterality: Right;    cyst removed      SURGICAL REMOVAL OF PILONIDAL CYST        No current facility-administered medications on file prior to encounter.     Current Outpatient Medications on File Prior to Encounter   Medication Sig Dispense Refill    levETIRAcetam (KEPPRA) 750 MG Tab Take 1 tablet (750 mg total) by mouth 2 (two) times daily. 60 tablet 1    indomethacin (INDOCIN) 50 MG capsule Take 1 capsule (50 mg total) by mouth 2 (two) times daily with meals. 90 capsule 0      Allergies: Patient has no known allergies.    Family History   Problem Relation Age of Onset    Cancer Maternal Grandfather     Prostate cancer Maternal Grandfather     Cancer Paternal Grandmother     Breast cancer Paternal Grandmother     Hypertension Mother     No Known Problems Sister     No Known Problems Brother      Social History      Tobacco Use    Smoking status: Former Smoker     Packs/day: 1.00     Years: 8.00     Pack years: 8.00     Types: Cigarettes     Start date: 2010     Quit date: 8/14/2018     Years since quitting: 3.5    Smokeless tobacco: Never Used   Substance Use Topics    Alcohol use: Yes     Alcohol/week: 42.0 standard drinks     Types: 42 Cans of beer per week     Comment: 6 pack daily     Drug use: No     Review of Systems   Constitutional: Negative.    HENT: Negative.    Eyes: Negative.    Respiratory: Negative.    Cardiovascular: Negative.    Gastrointestinal: Negative.    Endocrine: Negative.    Genitourinary: Negative.    Musculoskeletal: Negative.    Skin: Negative.    Neurological: Negative.      Objective:     Vitals:    Temp: 97.2 °F (36.2 °C)  Pulse: 76  Rhythm: normal sinus rhythm  BP: 116/72  MAP (mmHg): 88  Resp: (!) 59  SpO2: 99 %  O2 Device (Oxygen Therapy): room air    Temp  Min: 97.2 °F (36.2 °C)  Max: 98.7 °F (37.1 °C)  Pulse  Min: 74  Max: 105  BP  Min: 100/59  Max: 134/86  MAP (mmHg)  Min: 76  Max: 105  Resp  Min: 17  Max: 59  SpO2  Min: 96 %  Max: 99 %    No intake/output data recorded.   Unmeasured Output  Stool Occurrence: 0       Physical Exam  Neurological:      Comments: GCS 15  AAO X4  PERRL  RUE 5/5  LUE 5/5  RLE  5/5  LLE 5/5  Sensation intact in all extremities            Today I personally reviewed pertinent medications, lines/drains/airways, imaging, cardiology results, laboratory results, microbiology results, notably:        Assessment/Plan:     Neuro  Right frontal lobe mass  - see astrocytoma    Oncology  * Astrocytoma  S/p right frontal lobe resection    - admit to NCC  - Q1 neuro checks  - SBP< 160  - dexamethasone 4mg Q6  - Keppra 750 mg   - PT/OT/SLP  - MRI pending  - D/C angelo          The patient is being Prophylaxed for:  Venous Thromboembolism with: Mechanical  Stress Ulcer with: H2B  Ventilator Pneumonia with: not applicable    Activity Orders          Diet Adult Regular  (IDDSI Level 7): Regular starting at 02/17 1044        Prior  Level 3  Raiza Scott NP  Neurocritical Care  [facility]

## 2022-02-17 NOTE — NURSING
Patient arrived to [facility] from OR >> [id number]  Type of stroke/diagnosis:  Crani-Mass resection    TPA start and end time N/A    Thrombectomy start and end time N/A    Current symptoms: alert, oriented x4, follows commands x4, denies pain     Skin assessment done: Y    Wounds noted: Crani incision    *If wounds noted, was Wound Care consulted? N/A    Calle Completed? Pending-drowsy    Patient Belongings on Admit: none    NCC notified: CHRISTIANE Scott NP

## 2022-02-17 NOTE — TRANSFER OF CARE
Anesthesia Transfer of Care Note    Patient: Дмитрий Winn    Procedure(s) Performed: Procedure(s) (LRB):  CRANIOTOMY, USING FRAMELESS STEREOTAXY (Right)    Patient location: ICU    Anesthesia Type: general    Transport from OR: Transported from OR on 6-10 L/min O2 by face mask with adequate spontaneous ventilation    Post pain: adequate analgesia    Post assessment: no apparent anesthetic complications    Post vital signs: stable    Level of consciousness: awake, alert and oriented    Nausea/Vomiting: no nausea/vomiting    Complications: none    Transfer of care protocol was followed      Last vitals:   Visit Vitals  /69 (BP Location: Right arm, Patient Position: Lying)   Pulse 105   Temp 37.1 °C (98.7 °F) (Oral)   Resp 18   Wt 72.1 kg (158 lb 15.2 oz)   SpO2 98%   BMI 25.66 kg/m²

## 2022-02-17 NOTE — PLAN OF CARE
[facility]  Initial Discharge Assessment       Primary Care Provider: Primary Doctor No    Admission Diagnosis: Astrocytoma [C71.9]  Brain tumor [D49.6]    Admission Date: 2/17/2022  Expected Discharge Date: 2/24/2022    Discharge Barriers Identified: (P) None    Payor: BLUE CROSS BLUE SHIELD / Plan: BLUE CONNECT / Product Type: HMO /     Extended Emergency Contact Information  Primary Emergency Contact: Blu Winn   [facility]  Home Phone: [phone]  Relation: Father    Discharge Plan A: (P) Home with family  Discharge Plan B: (P) Home Health      [facility] [address] - [place], [place] - [address]   [place] [address]  Phone: [phone] Fax: [phone]    CM completed pt's initial discharge assessment at the bedside. Pt alert and oriented. Pt plans to discharge to his parents' home. No DME.    CM will cont to follow for d/c needs.      Initial Assessment (most recent)       Adult Discharge Assessment - 02/17/22 1410          Discharge Assessment    Assessment Type Discharge Planning Assessment (P)      Confirmed/corrected address, phone number and insurance Yes (P)      Confirmed Demographics Correct on Facesheet (P)      Source of Information patient (P)      Communicated DANII with patient/caregiver Yes (P)      Reason For Admission Astrocytoma (P)      Lives With spouse (P)      Do you expect to return to your current living situation? No (P)      Do you have help at home or someone to help you manage your care at home? Yes (P)      Who are your caregiver(s) and their phone number(s)? Blu Winn (father) [phone] (P)      Current cognitive status: Alert/Oriented (P)      Home Layout -- (P)    2 story home with 0 JOSHUA    Equipment Currently Used at Home none (P)      Readmission within 30 days? No (P)      Do you currently have service(s) that help you manage your care at home? No (P)      Do you take prescription medications? Yes (P)       Do you have prescription coverage? Yes (P)      Coverage [employer] (P)      Do you have any problems affording any of your prescribed medications? No (P)      Is the patient taking medications as prescribed? yes (P)      Who is going to help you get home at discharge? Blu Winn (father) [phone] (P)      How do you get to doctors appointments? family or friend will provide (P)      Are you on dialysis? No (P)      Do you take coumadin? No (P)      Discharge Plan A Home with family (P)      Discharge Plan B Home Health (P)      Discharge Plan discussed with: Patient (P)      Discharge Barriers Identified None (P)         Relationship/Environment    Name(s) of Who Lives With Patient Pt lived w/spouse but plans to d/c to his parents' home and does not think it is necessary to list his spouse and her number (P)                    Melisa Junior RN Case Manager  [facility]  [phone]

## 2022-02-17 NOTE — ANESTHESIA PROCEDURE NOTES
Arterial    Diagnosis: Intracranial mass    Patient location during procedure: done in OR  Procedure start time: 2/17/2022 7:28 AM  Timeout: 2/17/2022 7:28 AM  Procedure end time: 2/17/2022 7:32 AM    Staffing  Authorizing Provider: Ray Stone MD  Performing Provider: Girma Lazar DO    Staffing  Other anesthesia staff: Ray Stone MD  Anesthesiologist was present at the time of the procedure.    Preanesthetic Checklist  Completed: patient identified, IV checked, site marked, risks and benefits discussed, surgical consent, monitors and equipment checked, pre-op evaluation, timeout performed and anesthesia consent givenArterial  Orientation: left  Location: radial    Catheter Size: 20 G  Catheter placement by Anatomical landmarks. Heme positive aspiration all ports.Insertion Attempts: 1  Assessment  Dressing: secured with tape and tegaderm  Patient: Tolerated well

## 2022-02-17 NOTE — SUBJECTIVE & OBJECTIVE
Past Medical History:   Diagnosis Date    Ataxia 5/29/2020    Cancer     brain tumor    Epilepsy      Past Surgical History:   Procedure Laterality Date    CRANIOTOMY FOR EXCISION OF INTRACRANIAL TUMOR Right 8/10/2018    Procedure: CRANIOTOMY, FOR INTRACRANIAL NEOPLASM EXCISION Right eyebrow incision / LAY[facility] craniotomy for tumor resection;  Surgeon: Lefty Paulson MD;  Location: [facility];  Service: Neurosurgery;  Laterality: Right;    cyst removed      SURGICAL REMOVAL OF PILONIDAL CYST        No current facility-administered medications on file prior to encounter.     Current Outpatient Medications on File Prior to Encounter   Medication Sig Dispense Refill    levETIRAcetam (KEPPRA) 750 MG Tab Take 1 tablet (750 mg total) by mouth 2 (two) times daily. 60 tablet 1    indomethacin (INDOCIN) 50 MG capsule Take 1 capsule (50 mg total) by mouth 2 (two) times daily with meals. 90 capsule 0      Allergies: Patient has no known allergies.    Family History   Problem Relation Age of Onset    Cancer Maternal Grandfather     Prostate cancer Maternal Grandfather     Cancer Paternal Grandmother     Breast cancer Paternal Grandmother     Hypertension Mother     No Known Problems Sister     No Known Problems Brother      Social History     Tobacco Use    Smoking status: Former Smoker     Packs/day: 1.00     Years: 8.00     Pack years: 8.00     Types: Cigarettes     Start date: 2010     Quit date: 8/14/2018     Years since quitting: 3.5    Smokeless tobacco: Never Used   Substance Use Topics    Alcohol use: Yes     Alcohol/week: 42.0 standard drinks     Types: 42 Cans of beer per week     Comment: 6 pack daily     Drug use: No     Review of Systems   Constitutional: Negative.    HENT: Negative.    Eyes: Negative.    Respiratory: Negative.    Cardiovascular: Negative.    Gastrointestinal: Negative.    Endocrine: Negative.    Genitourinary: Negative.    Musculoskeletal: Negative.    Skin: Negative.     Neurological: Negative.      Objective:     Vitals:    Temp: 97.2 °F (36.2 °C)  Pulse: 76  Rhythm: normal sinus rhythm  BP: 116/72  MAP (mmHg): 88  Resp: (!) 59  SpO2: 99 %  O2 Device (Oxygen Therapy): room air    Temp  Min: 97.2 °F (36.2 °C)  Max: 98.7 °F (37.1 °C)  Pulse  Min: 74  Max: 105  BP  Min: 100/59  Max: 134/86  MAP (mmHg)  Min: 76  Max: 105  Resp  Min: 17  Max: 59  SpO2  Min: 96 %  Max: 99 %    No intake/output data recorded.   Unmeasured Output  Stool Occurrence: 0       Physical Exam  Neurological:      Comments: GCS 15  AAO X4  PERRL  RUE 5/5  LUE 5/5  RLE  5/5  LLE 5/5  Sensation intact in all extremities            Today I personally reviewed pertinent medications, lines/drains/airways, imaging, cardiology results, laboratory results, microbiology results, notably:

## 2022-02-18 LAB
ANION GAP SERPL CALC-SCNC: 11 MMOL/L (ref 8–16)
BASOPHILS # BLD AUTO: 0.02 K/UL (ref 0–0.2)
BASOPHILS NFR BLD: 0.1 % (ref 0–1.9)
BUN SERPL-MCNC: 8 MG/DL (ref 6–20)
CALCIUM SERPL-MCNC: 9.2 MG/DL (ref 8.7–10.5)
CHLORIDE SERPL-SCNC: 108 MMOL/L (ref 95–110)
CO2 SERPL-SCNC: 21 MMOL/L (ref 23–29)
CREAT SERPL-MCNC: 0.8 MG/DL (ref 0.5–1.4)
DIFFERENTIAL METHOD: ABNORMAL
EOSINOPHIL # BLD AUTO: 0 K/UL (ref 0–0.5)
EOSINOPHIL NFR BLD: 0 % (ref 0–8)
ERYTHROCYTE [DISTWIDTH] IN BLOOD BY AUTOMATED COUNT: 12.3 % (ref 11.5–14.5)
EST. GFR  (AFRICAN AMERICAN): >60 ML/MIN/1.73 M^2
EST. GFR  (NON AFRICAN AMERICAN): >60 ML/MIN/1.73 M^2
GLUCOSE SERPL-MCNC: 134 MG/DL (ref 70–110)
HCT VFR BLD AUTO: 36.9 % (ref 40–54)
HGB BLD-MCNC: 12.6 G/DL (ref 14–18)
IMM GRANULOCYTES # BLD AUTO: 0.08 K/UL (ref 0–0.04)
IMM GRANULOCYTES NFR BLD AUTO: 0.5 % (ref 0–0.5)
LYMPHOCYTES # BLD AUTO: 0.9 K/UL (ref 1–4.8)
LYMPHOCYTES NFR BLD: 5.5 % (ref 18–48)
MCH RBC QN AUTO: 29.6 PG (ref 27–31)
MCHC RBC AUTO-ENTMCNC: 34.1 G/DL (ref 32–36)
MCV RBC AUTO: 87 FL (ref 82–98)
MONOCYTES # BLD AUTO: 1.1 K/UL (ref 0.3–1)
MONOCYTES NFR BLD: 6.6 % (ref 4–15)
NEUTROPHILS # BLD AUTO: 14.8 K/UL (ref 1.8–7.7)
NEUTROPHILS NFR BLD: 87.3 % (ref 38–73)
NRBC BLD-RTO: 0 /100 WBC
PLATELET # BLD AUTO: 264 K/UL (ref 150–450)
PMV BLD AUTO: 11.7 FL (ref 9.2–12.9)
POTASSIUM SERPL-SCNC: 3.8 MMOL/L (ref 3.5–5.1)
RBC # BLD AUTO: 4.25 M/UL (ref 4.6–6.2)
SODIUM SERPL-SCNC: 140 MMOL/L (ref 136–145)
WBC # BLD AUTO: 16.92 K/UL (ref 3.9–12.7)

## 2022-02-18 PROCEDURE — 85025 COMPLETE CBC W/AUTO DIFF WBC: CPT | Performed by: STUDENT IN AN ORGANIZED HEALTH CARE EDUCATION/TRAINING PROGRAM

## 2022-02-18 PROCEDURE — 80048 BASIC METABOLIC PNL TOTAL CA: CPT | Performed by: STUDENT IN AN ORGANIZED HEALTH CARE EDUCATION/TRAINING PROGRAM

## 2022-02-18 PROCEDURE — 99233 PR SUBSEQUENT HOSPITAL CARE,LEVL III: ICD-10-PCS | Mod: ,,, | Performed by: NURSE PRACTITIONER

## 2022-02-18 PROCEDURE — 97161 PT EVAL LOW COMPLEX 20 MIN: CPT

## 2022-02-18 PROCEDURE — 25000003 PHARM REV CODE 250: Performed by: NURSE PRACTITIONER

## 2022-02-18 PROCEDURE — 63600175 PHARM REV CODE 636 W HCPCS: Performed by: NEUROLOGICAL SURGERY

## 2022-02-18 PROCEDURE — 25000003 PHARM REV CODE 250: Performed by: STUDENT IN AN ORGANIZED HEALTH CARE EDUCATION/TRAINING PROGRAM

## 2022-02-18 PROCEDURE — 11000001 HC ACUTE MED/SURG PRIVATE ROOM

## 2022-02-18 PROCEDURE — 97165 OT EVAL LOW COMPLEX 30 MIN: CPT

## 2022-02-18 PROCEDURE — 99233 SBSQ HOSP IP/OBS HIGH 50: CPT | Mod: ,,, | Performed by: NURSE PRACTITIONER

## 2022-02-18 PROCEDURE — 25000003 PHARM REV CODE 250: Performed by: PSYCHIATRY & NEUROLOGY

## 2022-02-18 PROCEDURE — 94761 N-INVAS EAR/PLS OXIMETRY MLT: CPT

## 2022-02-18 RX ORDER — AMOXICILLIN 250 MG
1 CAPSULE ORAL 2 TIMES DAILY
Status: DISCONTINUED | OUTPATIENT
Start: 2022-02-18 | End: 2022-02-19 | Stop reason: HOSPADM

## 2022-02-18 RX ADMIN — DEXAMETHASONE SODIUM PHOSPHATE 4 MG: 4 INJECTION INTRA-ARTICULAR; INTRALESIONAL; INTRAMUSCULAR; INTRAVENOUS; SOFT TISSUE at 02:02

## 2022-02-18 RX ADMIN — LEVETIRACETAM 750 MG: 750 TABLET ORAL at 08:02

## 2022-02-18 RX ADMIN — ACETAMINOPHEN 650 MG: 325 TABLET ORAL at 09:02

## 2022-02-18 RX ADMIN — DEXAMETHASONE SODIUM PHOSPHATE 4 MG: 4 INJECTION INTRA-ARTICULAR; INTRALESIONAL; INTRAMUSCULAR; INTRAVENOUS; SOFT TISSUE at 08:02

## 2022-02-18 RX ADMIN — SENNOSIDES AND DOCUSATE SODIUM 1 TABLET: 50; 8.6 TABLET ORAL at 08:02

## 2022-02-18 RX ADMIN — OXYCODONE HYDROCHLORIDE AND ACETAMINOPHEN 1 TABLET: 5; 325 TABLET ORAL at 07:02

## 2022-02-18 RX ADMIN — FAMOTIDINE 20 MG: 20 TABLET ORAL at 08:02

## 2022-02-18 RX ADMIN — OXYCODONE HYDROCHLORIDE AND ACETAMINOPHEN 1 TABLET: 5; 325 TABLET ORAL at 02:02

## 2022-02-18 NOTE — NURSING
Nursing Transfer Note       Transfer To [address]    Transfer via wheelchair    Transfer with cardiac monitoring    Transported by BRO Hughes     Medicines sent: no    Chart sent with patient: Yes    Belongings sent with patient: (specify belongings): with family    Notified: spouse and mother at bedsdie    Bedside Neuro assessment performed: Yes    Bedside Handoff given to: BRO Fletcher    Upon arrival to floor: cardiac monitor applied, patient oriented to room, call bell in reach and bed in lowest position

## 2022-02-18 NOTE — SUBJECTIVE & OBJECTIVE
Review of Systems  Review of Symptoms:  Constitutional: Denies fevers, weight loss, chills, or weakness.  Eyes: Denies changes in vision.  ENT: Denies dysphagia, nasal discharge, ear pain or discharge.  Cardiovascular: Denies chest pain, palpitations, orthopnea, or claudication.  Respiratory: Denies shortness of breath, cough, hemoptysis, or wheezing.  GI: Denies nausea/vomitting, hematochezia, melena, abd pain, or changes in appetite.  : Denies dysuria, incontinence, or hematuria.  Musculoskeletal: Denies joint pain or myalgias.  Skin/breast: Denies rashes, lumps, lesions, or discharge.  Neurologic: Denies headache, dizziness, vertigo, or paresthesias.  Psychiatric: Denies changes in mood or hallucinations.  Endocrine: Denies polyuria, polydipsia, heat/cold intolerance.  Hematologic/Lymph: Denies lymphadenopathy, easy bruising or easy bleeding.  Allergic/Immunologic: Denies rash, rhinitis.   Objective:     Vitals:  Temp: 99.3 °F (37.4 °C)  Pulse: 80  Rhythm: normal sinus rhythm  BP: 128/73  MAP (mmHg): 95  Resp: 17  SpO2: 97 %  O2 Device (Oxygen Therapy): room air    Temp  Min: 98.1 °F (36.7 °C)  Max: 99.3 °F (37.4 °C)  Pulse  Min: 58  Max: 101  BP  Min: 115/70  Max: 134/83  MAP (mmHg)  Min: 87  Max: 105  Resp  Min: 17  Max: 59  SpO2  Min: 96 %  Max: 100 %    02/17 0701 - 02/18 0700  In: 2398.1 [P.O.:340; I.V.:20]  Out: 3350 [Urine:3350]   Unmeasured Output  Urine Occurrence: 1  Stool Occurrence: 0       Physical Exam  GA: comfortable, no acute distress.   HEENT: No scleral icterus or JVD.   Cardiac: RRR S1 & S2 w/o rubs/murmurs/gallops.   Abdominal: Bowel sounds present x 4. No appreciable hepatosplenomegaly.  Skin: No jaundice, rashes, or visible lesions.  Neuro:  --GCS: E4 V5 M6  --Mental Status: awake, oriented X4, follows commands  --CN II-XII grossly intact.   --Pupils 3mm, PERRL.   --Corneal reflex, gag, cough intact.  --VALDEZ spont    Medications:  Continuous Scheduleddexamethasone, 4 mg,  Q6H  famotidine, 20 mg, BID  levETIRAcetam, 750 mg, BID  senna-docusate 8.6-50 mg, 1 tablet, BID    PRNacetaminophen, 650 mg, Q6H PRN  labetalol, 10 mg, Q4H PRN  oxyCODONE-acetaminophen, 1 tablet, Q4H PRN      Today I personally reviewed pertinent medications, lines/drains/airways, imaging, cardiology results, laboratory results,     Diet  Diet Adult Regular (IDDSI Level 7)

## 2022-02-18 NOTE — PLAN OF CARE
PT evaluation complete and appropriate goals established.    2022    Problem: Physical Therapy Goal  Goal: Physical Therapy Goal  Description: Goals to be met by: 2022     Patient will increase functional independence with mobility by performin. Supine to sit with [place]  2. Sit to stand transfer with [place]  3. Gait  x 300 feet with [place] without AD.   4. Ascend/descend 3 stair with left Handrails Supervision.  5. Lower extremity exercise program x15 reps, with supervision, in order to increase LE strength and (I) with functional mobility.      Outcome: Ongoing, Progressing

## 2022-02-18 NOTE — HOSPITAL COURSE
2/18: OR yesterday for brain tumor removal. Tolerated procedure well, only mild headache this morning. Tolerating PO. Not OOB yet. Intact on exam. OK for step down to floor today  2/19: POD#2. NAEON. AFVSS. Pt doing well. Reports mild incisional pain, o/w no acute complaints. Neuro intact. Tolerating diet. Voiding, + flatus. Ambulating independently. Postop MRI shows GTR, expected postop blood products. Medically stable for discharge home today. Taper steroids over 3 weeks. Discussed postop and discharge instructions, all questions answered, postop appointment scheduled. Encouraged to call our office with any questions or concerns.

## 2022-02-18 NOTE — PLAN OF CARE
[facility] Care Plan    POC reviewed with Дмитрий Winn and family at 0300. Pt verbalized understanding. Questions and concerns addressed. No acute events overnight. Pt progressing toward goals. Will continue to monitor. See below and flowsheets for full assessment and VS info.       Neuro:  [place] Coma Scale  Best Eye Response: 4-->(E4) spontaneous  Best Motor Response: 6-->(M6) obeys commands  Best Verbal Response: 5-->(V5) oriented  [place] Coma Scale Score: 15  Assessment Qualifiers: patient not sedated/intubated        24hr Temp:  [97.2 °F (36.2 °C)-98.7 °F (37.1 °C)]     CV:   Rhythm: normal sinus rhythm  BP goals:   SBP < 160  MAP > 65    Resp:   O2 Device (Oxygen Therapy): room air       Plan: N/A    GI/:     Diet/Nutrition Received: regular  Last Bowel Movement: 02/16/22       Intake/Output Summary (Last 24 hours) at 2/18/2022 0441  Last data filed at 2/18/2022 0100  Gross per 24 hour   Intake 2398.06 ml   Output 3350 ml   Net -951.94 ml     Unmeasured Output  Stool Occurrence: 0    Labs/Accuchecks:  Recent Labs   Lab 02/18/22  0125   WBC 16.92*   RBC 4.25*   HGB 12.6*   HCT 36.9*         Recent Labs   Lab 02/11/22  0947 02/11/22  0947 02/18/22  0125      < > 140   K 4.6   < > 3.8   CO2 26   < > 21*      < > 108   BUN 9   < > 8   CREATININE 0.9   < > 0.8   ALKPHOS 113  --   --    ALT 63*  --   --    AST 34  --   --    BILITOT 0.5  --   --     < > = values in this interval not displayed.      Recent Labs   Lab 02/11/22  0947   INR 1.0   APTT 27.7    No results for input(s): CPK, CPKMB, TROPONINI, MB in the last 168 hours.    Electrolytes: No replacement orders  Accuchecks: none    Gtts:       LDA/Wounds:  Lines/Drains/Airways       Arterial Line              Arterial Line 02/17/22 0728 Left Radial <1 day              Peripheral Intravenous Line                   Peripheral IV - Single Lumen 02/17/22 0528 18 G Left;Posterior Forearm <1 day                  Wounds: No  Wound care  consulted: No

## 2022-02-18 NOTE — PLAN OF CARE
Janes EVERETT/THOMAS OT 2/18/22    Problem: Occupational Therapy Goal  Goal: Occupational Therapy Goal  Outcome: Met

## 2022-02-18 NOTE — ASSESSMENT & PLAN NOTE
36 M PMHx R frontal LGG, prior crani for mass resection in 2018 with increasing size of residual tumor, now s/p crani for tumor on 2/17/2022    -- ICU status, OK for TTF under NSGY at this time  -- q1 hour neurochecks while in unit, q4 while on floor  -- SBP < 160  -- Dex 4q6  -- PPI while on dex  -- Keppra 750 BID  -- Regular diet  -- PT/OT  -- PPx: SQH, BR, SCDs    Dispo: pending PT/OT, TTF today

## 2022-02-18 NOTE — ANESTHESIA POSTPROCEDURE EVALUATION
Anesthesia Post Evaluation    Patient: Дмитрий Winn    Procedure(s) Performed: Procedure(s) (LRB):  CRANIOTOMY, USING FRAMELESS STEREOTAXY (Right)    Final Anesthesia Type: general      Patient location during evaluation: ICU  Patient participation: Yes- Able to Participate  Level of consciousness: awake and alert and oriented  Post-procedure vital signs: reviewed and stable  Pain management: adequate  Airway patency: patent    PONV status at discharge: No PONV  Anesthetic complications: no      Cardiovascular status: hemodynamically stable  Respiratory status: unassisted, spontaneous ventilation and nasal cannula  Hydration status: euvolemic  Follow-up not needed.          Vitals Value Taken Time   /78 02/18/22 0905   Temp 37 °C (98.6 °F) 02/18/22 0705   Pulse 77 02/18/22 0926   Resp 20 02/18/22 0926   SpO2 99 % 02/18/22 0926   Vitals shown include unvalidated device data.      No case tracking events are documented in the log.      Pain/Ramsey Score: Pain Rating Prior to Med Admin: 2 (2/18/2022  9:02 AM)

## 2022-02-18 NOTE — SUBJECTIVE & OBJECTIVE
Interval History:     2/18: OR yesterday for brain tumor removal. Tolerated procedure well, only mild headache this morning. Tolerating PO. Not OOB yet. Intact on exam. OK for step down to floor today    Medications:  Continuous Infusions:  Scheduled Meds:   dexamethasone  4 mg Intravenous Q6H    famotidine  20 mg Oral BID    levETIRAcetam  750 mg Oral BID    senna-docusate 8.6-50 mg  1 tablet Oral BID     PRN Meds:acetaminophen, labetalol, oxyCODONE-acetaminophen     Review of Systems   All other systems reviewed and are negative.    Objective:     Weight: 72.1 kg (158 lb 15.2 oz)  Body mass index is 25.66 kg/m².  Vital Signs (Most Recent):  Temp: 98.6 °F (37 °C) (02/18/22 0705)  Pulse: 92 (02/18/22 0905)  Resp: 19 (02/18/22 0905)  BP: 127/78 (02/18/22 0905)  SpO2: 97 % (02/18/22 0905) Vital Signs (24h Range):  Temp:  [97.2 °F (36.2 °C)-98.7 °F (37.1 °C)] 98.6 °F (37 °C)  Pulse:  [] 92  Resp:  [17-59] 19  SpO2:  [96 %-100 %] 97 %  BP: (100-134)/(59-87) 127/78  Arterial Line BP: (111-166)/(60-92) 166/92                          Physical Exam  Constitutional:       General: He is not in acute distress.  Eyes:      Extraocular Movements: EOM normal.      Pupils: Pupils are equal, round, and reactive to light.   Cardiovascular:      Rate and Rhythm: Normal rate and regular rhythm.   Abdominal:      Palpations: Abdomen is soft.   Neurological:      Mental Status: He is alert.       E4V5M6  AOx3  PERRL  EOMI  Face Symmetric  Tongue midline  BUE 5/5  BLE 5/5  No drift    Head incision dressed, c/d/i    Significant Labs:  Recent Labs   Lab 02/18/22  0125   *      K 3.8      CO2 21*   BUN 8   CREATININE 0.8   CALCIUM 9.2     Recent Labs   Lab 02/17/22  0752 02/17/22  0955 02/18/22  0125   WBC  --   --  16.92*   HGB  --   --  12.6*   HCT 34* 33* 36.9*   PLT  --   --  264     No results for input(s): LABPT, INR, APTT in the last 48 hours.  Microbiology Results (last 7 days)     ** No results found  for the last 168 hours. **        All pertinent labs from the last 24 hours have been reviewed.    Significant Diagnostics:  I have reviewed and interpreted all pertinent imaging results/findings within the past 24 hours.

## 2022-02-18 NOTE — PROGRESS NOTES
[facility] - Neurosurgery ([facility])  Neurocritical Care  Progress Note    Admit Date: 2/17/2022  Service Date: 02/18/2022  Length of Stay: 1    Subjective:     Chief Complaint: Astrocytoma    History of Present Illness: Mr. Дмитрий Winn is a 36 y.o. 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 Patient admittd to [facility] s/p frontal mass resection. Admit for higher level of care and neuro monitoring.      Hospital Course: 02/18/2022: ROXANN, plan to stepdown to NSGY team , d/c A-line          Review of Systems  Review of Symptoms:  Constitutional: Denies fevers, weight loss, chills, or weakness.  Eyes: Denies changes in vision.  ENT: Denies dysphagia, nasal discharge, ear pain or discharge.  Cardiovascular: Denies chest pain, palpitations, orthopnea, or claudication.  Respiratory: Denies shortness of breath, cough, hemoptysis, or wheezing.  GI: Denies nausea/vomitting, hematochezia, melena, abd pain, or changes in appetite.  : Denies dysuria, incontinence, or hematuria.  Musculoskeletal: Denies joint pain or myalgias.  Skin/breast: Denies rashes, lumps, lesions, or discharge.  Neurologic: Denies headache, dizziness, vertigo, or paresthesias.  Psychiatric: Denies changes in mood or hallucinations.  Endocrine: Denies polyuria, polydipsia, heat/cold intolerance.  Hematologic/Lymph: Denies lymphadenopathy, easy bruising or easy bleeding.  Allergic/Immunologic: Denies rash, rhinitis.   Objective:     Vitals:  Temp: 99.3 °F (37.4 °C)  Pulse: 80  Rhythm: normal sinus rhythm  BP: 128/73  MAP (mmHg): 95  Resp: 17  SpO2: 97 %  O2 Device (Oxygen Therapy): room air    Temp  Min: 98.1 °F (36.7 °C)  Max: 99.3 °F (37.4 °C)  Pulse  Min: 58  Max: 101  BP  Min: 115/70   Max: 134/83  MAP (mmHg)  Min: 87  Max: 105  Resp  Min: 17  Max: 59  SpO2  Min: 96 %  Max: 100 %    02/17 0701 - 02/18 0700  In: 2398.1 [P.O.:340; I.V.:20]  Out: 3350 [Urine:3350]   Unmeasured Output  Urine Occurrence: 1  Stool Occurrence: 0       Physical Exam  GA: comfortable, no acute distress.   HEENT: No scleral icterus or JVD.   Cardiac: RRR S1 & S2 w/o rubs/murmurs/gallops.   Abdominal: Bowel sounds present x 4. No appreciable hepatosplenomegaly.  Skin: No jaundice, rashes, or visible lesions.  Neuro:  --GCS: E4 V5 M6  --Mental Status: awake, oriented X4, follows commands  --CN II-XII grossly intact.   --Pupils 3mm, PERRL.   --Corneal reflex, gag, cough intact.  --VALDEZ spont    Medications:  Continuous Scheduleddexamethasone, 4 mg, Q6H  famotidine, 20 mg, BID  levETIRAcetam, 750 mg, BID  senna-docusate 8.6-50 mg, 1 tablet, BID    PRNacetaminophen, 650 mg, Q6H PRN  labetalol, 10 mg, Q4H PRN  oxyCODONE-acetaminophen, 1 tablet, Q4H PRN      Today I personally reviewed pertinent medications, lines/drains/airways, imaging, cardiology results, laboratory results,     Diet  Diet Adult Regular (IDDSI Level 7)        Assessment/Plan:     Neuro  Right frontal lobe mass  - see astrocytoma    Oncology  * Astrocytoma  S/p right frontal lobe resection    - admit to [facility]  - Q1 neuro checks  - SBP< 160  - dexamethasone 4mg Q6  - Keppra 750 mg   - PT/OT/SLP  - MRI   - 2/18/2022: NAEON, plan to stepdown to NSGY team          The patient is being Prophylaxed for:  Venous Thromboembolism with: Mechanical  Stress Ulcer with: None  Ventilator Pneumonia with: not applicable    Activity Orders          Diet Adult Regular (IDDSI Level 7): Regular starting at 02/17 1044        Prior    Theresa Singh NP  Neurocritical Care  [facility] - Neurosurgery ([facility])

## 2022-02-18 NOTE — PLAN OF CARE
Problem: Adult Inpatient Plan of Care  Goal: Plan of Care Review  Outcome: Ongoing, Progressing  Flowsheets (Taken 2/18/2022 1476)  Plan of Care Reviewed With:   patient   spouse   mother  Goal: Patient-Specific Goal (Individualized)  Outcome: Ongoing, Progressing  Goal: Optimal Comfort and Wellbeing  Outcome: Ongoing, Progressing     Problem: Infection  Goal: Absence of Infection Signs and Symptoms  Outcome: Ongoing, Progressing    POC reviewed with the patient and they verbalized understanding. All comments and concerns addressed. Bed locked in lowest position with bed alarm set, call light within reach. Fall/Safety precautions maintained. VSS, see flowsheets. No events this shift. Will continue to monitor for changes to POC and clinical condition.

## 2022-02-18 NOTE — PT/OT/SLP EVAL
Occupational Therapy   Co-Evaluation and Discharge Note    Name: Дмитрий Winn  MRN: [id number]  Admitting Diagnosis: Astrocytoma   Recent Surgery: Procedure(s) (LRB):  CRANIOTOMY, USING FRAMELESS STEREOTAXY (Right) 1 Day Post-Op    Recommendations:     Discharge Recommendations: home  Discharge Equipment Recommendations: none  Barriers to discharge: None    Assessment:     Дмитрий Winn is a 36 y.o. male with a medical diagnosis of Astrocytoma. At this time, patient is functioning at their prior level of function and does not require further acute OT services.     Plan:     During this hospitalization, patient does not require further acute OT services.  Please re-consult if situation changes.    · Plan of Care Reviewed with: patient,spouse,mother    Subjective     Chief Complaint: Pain  Patient/Family Comments/goals: Return home    Occupational Profile:  Pt lives with his wife in a 1SH with 3 JOSHUA, L handrail.   Prior to admission, patients level of function was independent with ambulation and ADLs, including driving. Works as a del real.    Equipment used at home: none.    DME owned (not currently used): none.    Upon discharge, patient will have assistance from family.    Pain/Comfort:  · Pain Rating 1:  (Did not rate)  · Location 1: head  · Pain Addressed 1: Pre-medicate for activity,Reposition  · Pain Rating Post-Intervention 1:  (remained throughout)    Patients cultural, spiritual, Ep[employer]alian conflicts given the current situation: no    Objective:     Communicated with: RN prior to session. Patient found with HOB elevated with telemetry,blood pressure cuff,pulse ox (continuous),peripheral IV,arterial line,SCD upon OT entry to room, family present.  Co-evaluation performed due to patient's multiple deficits requiring two skilled therapists to appropriately and safely assess patient's strength and endurance while facilitating functional tasks in addition to accommodating for patient's  activity tolerance.     General Precautions: Standard, fall   Orthopedic Precautions:N/A   Braces: N/A  Respiratory Status: Room air     Occupational Performance:    Bed Mobility:    · Patient completed Supine to Sit with modified independence    Functional Mobility/Transfers:  · Patient completed Sit <> Stand Transfer with independence with no assistive device from EOB and bedside chair  · Functional Mobility: Within room household distance several laps with supervision for line management, no assistive device    Activities of Daily Living:  · Lower Body Dressing: independence      Cognitive/Visual Perceptual:  Cognitive/Psychosocial Skills:     -       Oriented to: Person, Place, Time and Situation   -       Follows Commands/attention: Follows multistep commands  -       Communication: clear/fluent  -       Safety awareness/insight to disability: intact   Visual/Perceptual:      -Intact      Physical Exam:  Balance:    -       good sitting and standing balance  Postural examination/scapula alignment:    -       No postural abnormalities identified  Sensation:    -       Intact  Upper Extremity Range of Motion:     -       Right Upper Extremity: WNL  -       Left Upper Extremity: WNL  Upper Extremity Strength:    -       Right Upper Extremity: WNL  -       Left Upper Extremity: WNL   Strength:    -       Right Upper Extremity: WNL  -       Left Upper Extremity: WNL  Fine Motor Coordination:    -       Intact    AMPAC 6 Click ADL:  AMPAC Total Score: 24    Treatment & Education:  OT eval; educated on OT role and POC as well as no further acute OT needs; RN updated on pt's assistance levels  Education:    Patient left up in chair with all lines intact, call button in reach, RN notified and family present    GOALS:   Multidisciplinary Problems     Occupational Therapy Goals     Not on file          Multidisciplinary Problems (Resolved)        Problem: Occupational Therapy Goal    Goal Priority Disciplines Outcome  Interventions   Occupational Therapy Goal   (Resolved)     OT, PT/OT Met                    History:     Past Medical History:   Diagnosis Date    Ataxia 5/29/2020    Cancer     brain tumor    Epilepsy        Past Surgical History:   Procedure Laterality Date    CRANIOTOMY FOR EXCISION OF INTRACRANIAL TUMOR Right 8/10/2018    Procedure: CRANIOTOMY, FOR INTRACRANIAL NEOPLASM EXCISION Right eyebrow incision / LAY[facility] craniotomy for tumor resection;  Surgeon: Lefty Paulson MD;  Location: [facility];  Service: Neurosurgery;  Laterality: Right;    CRANIOTOMY USING FRAMELESS STEREOTAXY Right 2/17/2022    Procedure: CRANIOTOMY, USING FRAMELESS STEREOTAXY;  Surgeon: Chago Van MD;  Location: [address];  Service: Neurosurgery;  Laterality: Right;    cyst removed      SURGICAL REMOVAL OF PILONIDAL CYST         Time Tracking:     OT Date of Treatment: 02/18/22  OT Start Time: 0859  OT Stop Time: 0913  OT Total Time (min): 14 min    Billable Minutes:Evaluation 14 minutes    2/18/2022

## 2022-02-18 NOTE — PROGRESS NOTES
[facility]  Neurosurgery  Progress Note    Subjective:     History of Present Illness: 36 M PMHx R frontal LGG, prior crani for mass resection in 2018 with increasing size of residual tumor    Post-Op Info:  Procedure(s) (LRB):  CRANIOTOMY, USING FRAMELESS STEREOTAXY (Right)   1 Day Post-Op     Interval History:     2/18: OR yesterday for brain tumor removal. Tolerated procedure well, only mild headache this morning. Tolerating PO. Not OOB yet. Intact on exam. OK for step down to floor today    Medications:  Continuous Infusions:  Scheduled Meds:   dexamethasone  4 mg Intravenous Q6H    famotidine  20 mg Oral BID    levETIRAcetam  750 mg Oral BID    senna-docusate 8.6-50 mg  1 tablet Oral BID     PRN Meds:acetaminophen, labetalol, oxyCODONE-acetaminophen     Review of Systems   All other systems reviewed and are negative.    Objective:     Weight: 72.1 kg (158 lb 15.2 oz)  Body mass index is 25.66 kg/m².  Vital Signs (Most Recent):  Temp: 98.6 °F (37 °C) (02/18/22 0705)  Pulse: 92 (02/18/22 0905)  Resp: 19 (02/18/22 0905)  BP: 127/78 (02/18/22 0905)  SpO2: 97 % (02/18/22 0905) Vital Signs (24h Range):  Temp:  [97.2 °F (36.2 °C)-98.7 °F (37.1 °C)] 98.6 °F (37 °C)  Pulse:  [] 92  Resp:  [17-59] 19  SpO2:  [96 %-100 %] 97 %  BP: (100-134)/(59-87) 127/78  Arterial Line BP: (111-166)/(60-92) 166/92                          Physical Exam  Constitutional:       General: He is not in acute distress.  Eyes:      Extraocular Movements: EOM normal.      Pupils: Pupils are equal, round, and reactive to light.   Cardiovascular:      Rate and Rhythm: Normal rate and regular rhythm.   Abdominal:      Palpations: Abdomen is soft.   Neurological:      Mental Status: He is alert.       E4V5M6  AOx3  PERRL  EOMI  Face Symmetric  Tongue midline  BUE 5/5  BLE 5/5  No drift    Head incision dressed, c/d/i    Significant Labs:  Recent Labs   Lab 02/18/22  0125   *      K 3.8      CO2 21*    BUN 8   CREATININE 0.8   CALCIUM 9.2     Recent Labs   Lab 02/17/22  0752 02/17/22  0955 02/18/22  0125   WBC  --   --  16.92*   HGB  --   --  12.6*   HCT 34* 33* 36.9*   PLT  --   --  264     No results for input(s): LABPT, INR, APTT in the last 48 hours.  Microbiology Results (last 7 days)     ** No results found for the last 168 hours. **        All pertinent labs from the last 24 hours have been reviewed.    Significant Diagnostics:  I have reviewed and interpreted all pertinent imaging results/findings within the past 24 hours.    Assessment/Plan:     Right frontal lobe mass  36 M PMHx R frontal LGG, prior crani for mass resection in 2018 with increasing size of residual tumor, now s/p crani for tumor on 2/17/2022    -- ICU status, OK for TTF under NSGY at this time  -- q1 hour neurochecks while in unit, q4 while on floor  -- SBP < 160  -- Dex 4q6  -- PPI while on dex  -- Keppra 750 BID  -- Regular diet  -- PT/OT  -- PPx: SQH, BR, SCDs    Dispo: pending PT/OT, TTF today         Esau Goldberg MD  Neurosurgery  [facility]

## 2022-02-18 NOTE — ASSESSMENT & PLAN NOTE
S/p right frontal lobe resection    - admit to [facility]  - Q1 neuro checks  - SBP< 160  - dexamethasone 4mg Q6  - Keppra 750 mg   - PT/OT/SLP  - MRI   - 2/18/2022: ROXANN, plan to stepdown to NSGY team

## 2022-02-18 NOTE — PT/OT/SLP EVAL
"Physical Therapy Co-Evaluation    Patient Name:  Дмитрий Winn   MRN:  [id number]    Recommendations:     Discharge Recommendations:  home   Discharge Equipment Recommendations: none   Barriers to discharge: None    Assessment:     Дмитрий Winn is a 36 y.o. male admitted with a medical diagnosis of Astrocytoma.  He presents with the following impairments/functional limitations:  weakness,impaired endurance,impaired functional mobilty,pain. Pt completing functional mobility without physical assist or use of DME. Completed short gait bouts without LOB or deviations noted. Further progression of mobility limited 2* headache, acuity of condition, and length of lines. Pt would benefit from 1-2 additional sessions in order to address current deficits and progress functional mobility. Anticipate no further PT needs upon d/c.    Rehab Prognosis: Good; patient would benefit from acute skilled PT services to address these deficits and reach maximum level of function.    Recent Surgery: Procedure(s) (LRB):  CRANIOTOMY, USING FRAMELESS STEREOTAXY (Right) 1 Day Post-Op    Plan:     During this hospitalization, patient to be seen 3 x/week to address the identified rehab impairments via gait training,therapeutic activities,therapeutic exercises,neuromuscular re-education and progress toward the following goals:    · Plan of Care Expires:  03/19/22    Subjective     Chief Complaint: headache  Patient/Family Comments/goals: return home  Pain/Comfort:  · Pain Rating 1:  (reported headache, described as "throbbing"; did not rate pain)  · Pain Addressed 1: Pre-medicate for activity,Reposition,Distraction,Cessation of Activity    Patients cultural, spiritual, Sabianist conflicts given the current situation: no    Living Environment:  Pt lives with his wife in a 1SH with 3 JOSHUA, L handrail.   Prior to admission, patients level of function was independent with ambulation and ADLs, including driving. 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  Equipment used at home: none.  DME owned (not currently used): none.  Upon discharge, patient will have assistance from family.    Objective:     Communicated with RN prior to session.  Patient found supine with arterial line,pulse ox (continuous),telemetry,blood pressure cuff,SCD,bed alarm,peripheral IV  upon PT entry to room.    General Precautions: Standard, fall   Orthopedic Precautions:N/A   Braces: N/A  Respiratory Status: Room air    Exams:  · Cognitive Exam:  Patient is oriented to Person, Place, Time and Situation  · Sensation:    · -       Intact  · RLE ROM: WFL  · RLE Strength: WFL  · LLE ROM: WFL  · LLE Strength: WFL    Functional Mobility:  · Bed Mobility:     · Supine to Sit: modified independence with HOB elevated  · Transfers:     · Sit to Stand:  supervision with no AD, x1 rep from EOB and x1 rep from chair   · Bed to Chair: supervision with  no AD  using  Stand Pivot  · Gait: ~10 ft. x6 reps with supervision without AD  · No LOB or major deviations   · Distance limited 2* headache, acuity of condition, and length of lines     Therapeutic Activities and Exercises:  Pt educated on role of PT and PT POC. Pt verbalized understanding.   Pt educated on the effects of bed rest and the importance of OOB activity. Pt encouraged to sit UIC majority of day as tolerated and continue daily ambulation with nursing assist. Pt verbalized understanding.  Pt oriented to call bell and instructed to call for staff assist with standing tasks/transfers. Pt verbalized understanding.     AM-PAC 6 CLICK MOBILITY  Total Score:23     Patient left up in chair with all lines intact, call button in reach, RN notified and pt's wife and mother present.    GOALS:   Multidisciplinary Problems     Physical Therapy Goals        Problem: Physical Therapy Goal    Goal Priority Disciplines Outcome Goal Variances Interventions   Physical Therapy Goal     PT, PT/OT Ongoing, Progressing     Description: Goals to be met by:  2022     Patient will increase functional independence with mobility by performin. Supine to sit with Drain  2. Sit to stand transfer with Drain  3. Gait  x 300 feet with Drain without AD.   4. Ascend/descend 3 stair with left Handrails Supervision.  5. Lower extremity exercise program x15 reps, with supervision, in order to increase LE strength and (I) with functional mobility.                       History:     Past Medical History:   Diagnosis Date    Ataxia 2020    Cancer     brain tumor    Epilepsy        Past Surgical History:   Procedure Laterality Date    CRANIOTOMY FOR EXCISION OF INTRACRANIAL TUMOR Right 8/10/2018    Procedure: CRANIOTOMY, FOR INTRACRANIAL NEOPLASM EXCISION Right eyebrow incision / L[facility] craniotomy for tumor resection;  Surgeon: Lefty Paulson MD;  Location: [facility];  Service: Neurosurgery;  Laterality: Right;    CRANIOTOMY USING FRAMELESS STEREOTAXY Right 2022    Procedure: CRANIOTOMY, USING FRAMELESS STEREOTAXY;  Surgeon: Chago Van MD;  Location: [address];  Service: Neurosurgery;  Laterality: Right;    cyst removed      SURGICAL REMOVAL OF PILONIDAL CYST         Time Tracking:     PT Received On: 22  PT Start Time: 0859     PT Stop Time: 0915  PT Total Time (min): 16 min     Billable Minutes: Evaluation 16  (co-eval performed this date due to need for 2 skilled therapists in order to ensure pt safety, accomodate for pt activity tolerance, and maximize functional potential)     2022

## 2022-02-19 VITALS
BODY MASS INDEX: 25.54 KG/M2 | RESPIRATION RATE: 14 BRPM | DIASTOLIC BLOOD PRESSURE: 85 MMHG | HEART RATE: 63 BPM | OXYGEN SATURATION: 97 % | TEMPERATURE: 98 F | WEIGHT: 158.94 LBS | HEIGHT: 66 IN | SYSTOLIC BLOOD PRESSURE: 132 MMHG

## 2022-02-19 LAB
ANION GAP SERPL CALC-SCNC: 13 MMOL/L (ref 8–16)
BASOPHILS # BLD AUTO: 0.02 K/UL (ref 0–0.2)
BASOPHILS NFR BLD: 0.1 % (ref 0–1.9)
BUN SERPL-MCNC: 14 MG/DL (ref 6–20)
CALCIUM SERPL-MCNC: 9.1 MG/DL (ref 8.7–10.5)
CHLORIDE SERPL-SCNC: 105 MMOL/L (ref 95–110)
CO2 SERPL-SCNC: 24 MMOL/L (ref 23–29)
CREAT SERPL-MCNC: 0.7 MG/DL (ref 0.5–1.4)
DIFFERENTIAL METHOD: ABNORMAL
EOSINOPHIL # BLD AUTO: 0 K/UL (ref 0–0.5)
EOSINOPHIL NFR BLD: 0 % (ref 0–8)
ERYTHROCYTE [DISTWIDTH] IN BLOOD BY AUTOMATED COUNT: 12.2 % (ref 11.5–14.5)
EST. GFR  (AFRICAN AMERICAN): >60 ML/MIN/1.73 M^2
EST. GFR  (NON AFRICAN AMERICAN): >60 ML/MIN/1.73 M^2
GLUCOSE SERPL-MCNC: 93 MG/DL (ref 70–110)
HCT VFR BLD AUTO: 34.7 % (ref 40–54)
HGB BLD-MCNC: 11.7 G/DL (ref 14–18)
IMM GRANULOCYTES # BLD AUTO: 0.11 K/UL (ref 0–0.04)
IMM GRANULOCYTES NFR BLD AUTO: 0.7 % (ref 0–0.5)
LYMPHOCYTES # BLD AUTO: 0.9 K/UL (ref 1–4.8)
LYMPHOCYTES NFR BLD: 5.9 % (ref 18–48)
MCH RBC QN AUTO: 29.8 PG (ref 27–31)
MCHC RBC AUTO-ENTMCNC: 33.7 G/DL (ref 32–36)
MCV RBC AUTO: 88 FL (ref 82–98)
MONOCYTES # BLD AUTO: 1.8 K/UL (ref 0.3–1)
MONOCYTES NFR BLD: 11.6 % (ref 4–15)
NEUTROPHILS # BLD AUTO: 12.6 K/UL (ref 1.8–7.7)
NEUTROPHILS NFR BLD: 81.7 % (ref 38–73)
NRBC BLD-RTO: 0 /100 WBC
PLATELET # BLD AUTO: 232 K/UL (ref 150–450)
PMV BLD AUTO: 13.5 FL (ref 9.2–12.9)
POTASSIUM SERPL-SCNC: 3.6 MMOL/L (ref 3.5–5.1)
RBC # BLD AUTO: 3.93 M/UL (ref 4.6–6.2)
SODIUM SERPL-SCNC: 142 MMOL/L (ref 136–145)
WBC # BLD AUTO: 15.48 K/UL (ref 3.9–12.7)

## 2022-02-19 PROCEDURE — 80048 BASIC METABOLIC PNL TOTAL CA: CPT | Performed by: STUDENT IN AN ORGANIZED HEALTH CARE EDUCATION/TRAINING PROGRAM

## 2022-02-19 PROCEDURE — 25000003 PHARM REV CODE 250: Performed by: PSYCHIATRY & NEUROLOGY

## 2022-02-19 PROCEDURE — 36415 COLL VENOUS BLD VENIPUNCTURE: CPT | Performed by: STUDENT IN AN ORGANIZED HEALTH CARE EDUCATION/TRAINING PROGRAM

## 2022-02-19 PROCEDURE — 25000003 PHARM REV CODE 250: Performed by: NURSE PRACTITIONER

## 2022-02-19 PROCEDURE — 25000003 PHARM REV CODE 250: Performed by: STUDENT IN AN ORGANIZED HEALTH CARE EDUCATION/TRAINING PROGRAM

## 2022-02-19 PROCEDURE — 63600175 PHARM REV CODE 636 W HCPCS: Performed by: NEUROLOGICAL SURGERY

## 2022-02-19 PROCEDURE — 85025 COMPLETE CBC W/AUTO DIFF WBC: CPT | Performed by: STUDENT IN AN ORGANIZED HEALTH CARE EDUCATION/TRAINING PROGRAM

## 2022-02-19 PROCEDURE — 99024 PR POST-OP FOLLOW-UP VISIT: ICD-10-PCS | Mod: ,,, | Performed by: PHYSICIAN ASSISTANT

## 2022-02-19 PROCEDURE — 99024 POSTOP FOLLOW-UP VISIT: CPT | Mod: ,,, | Performed by: PHYSICIAN ASSISTANT

## 2022-02-19 RX ORDER — FAMOTIDINE 20 MG/1
20 TABLET, FILM COATED ORAL 2 TIMES DAILY
Qty: 60 TABLET | Refills: 0 | Status: SHIPPED | OUTPATIENT
Start: 2022-02-19 | End: 2022-09-27

## 2022-02-19 RX ORDER — HYDROCODONE BITARTRATE AND ACETAMINOPHEN 5; 325 MG/1; MG/1
1 TABLET ORAL EVERY 6 HOURS PRN
Qty: 50 TABLET | Refills: 0 | Status: SHIPPED | OUTPATIENT
Start: 2022-02-19 | End: 2022-04-28

## 2022-02-19 RX ORDER — DEXAMETHASONE 2 MG/1
TABLET ORAL
Qty: 75 TABLET | Refills: 0 | Status: SHIPPED | OUTPATIENT
Start: 2022-02-19 | End: 2022-03-12

## 2022-02-19 RX ADMIN — DEXAMETHASONE SODIUM PHOSPHATE 4 MG: 4 INJECTION INTRA-ARTICULAR; INTRALESIONAL; INTRAMUSCULAR; INTRAVENOUS; SOFT TISSUE at 03:02

## 2022-02-19 RX ADMIN — DEXAMETHASONE SODIUM PHOSPHATE 4 MG: 4 INJECTION INTRA-ARTICULAR; INTRALESIONAL; INTRAMUSCULAR; INTRAVENOUS; SOFT TISSUE at 02:02

## 2022-02-19 RX ADMIN — OXYCODONE HYDROCHLORIDE AND ACETAMINOPHEN 1 TABLET: 5; 325 TABLET ORAL at 03:02

## 2022-02-19 RX ADMIN — LEVETIRACETAM 750 MG: 750 TABLET ORAL at 08:02

## 2022-02-19 RX ADMIN — SENNOSIDES AND DOCUSATE SODIUM 1 TABLET: 50; 8.6 TABLET ORAL at 08:02

## 2022-02-19 RX ADMIN — DEXAMETHASONE SODIUM PHOSPHATE 4 MG: 4 INJECTION INTRA-ARTICULAR; INTRALESIONAL; INTRAMUSCULAR; INTRAVENOUS; SOFT TISSUE at 08:02

## 2022-02-19 RX ADMIN — OXYCODONE HYDROCHLORIDE AND ACETAMINOPHEN 1 TABLET: 5; 325 TABLET ORAL at 02:02

## 2022-02-19 RX ADMIN — FAMOTIDINE 20 MG: 20 TABLET ORAL at 08:02

## 2022-02-19 NOTE — NURSING
AVS reviewed w/ patient. Patient verbalized understanding of education. All comments and concerns addressed. PIV & telemetry removed. VSS at discharge. All belongings sent with pt via private vehicle.

## 2022-02-19 NOTE — DISCHARGE INSTRUCTIONS
Neurosurgery Patient Information  -Return to work will be determined on an individual basis.  -No driving until released by your medical provider and while taking narcotic pain medication  -Do not take any OTC products containing acetaminophen at the same time as you take your narcotic pain medication. Medications that may contain acetaminophen include but are not limited to: Excedrin and other headache medications, arthritis medications, cold and sinus medications, etc. Please review the list of active ingredients in any OTC medication prior to taking it.  -Do not take any Aspirin or Aspirin-containing products for 2 weeks after surgery.  -Do not take any Aleve, Naprosyn, Naproxen, Ibuprofen, Advil or any other nonsteroidal anti-inflammatory drug (NSAID) for 2 weeks after surgery.  -Do not take any herbal supplements for 2 weeks after surgery.   -Do not consume any alcoholic beverages until released by your neurosurgeon  -Do not perform any excessive bending over or leaning forward as this is a fall hazard.  -Do not perform any heavy lifting or lifting more than 5-10 lbs from the ground level as this is a fall hazard.  -Slowly increase your ambulation [walking] over the next 2 weeks as tolerated. The goal is to be walking 1-2 miles by the time of your 2 week post op appointment.   -Walk on paved surfaces only. It is okay to walk up and down stairs while holding onto a side rail.      Contact the Neurosurgery Office immediately if:  If you begin to notice any neurologic changes such as:           -Sudden onset of lethargy or sleepiness           -Sudden confusion, trouble speaking, or understanding            -Sudden trouble seeing in one or both eyes            -Sudden trouble walking, dizziness, loss of coordination            -Sudden severe headache with no known cause            -Sudden onset of numbness or weakness     Wound Care:  Keep your incision open to air. You may shower on the 2nd day after your  surgery. Keep the incision clean and dry at all times. Do not allow the force of water to hit the incision. If the incision gets damp, gently pat it dry. Do not rub or scrub the incision. You cannot take a bath/swim/submerge the incision until 8 weeks after surgery.    The incision does not need to be cleaned with any water, soap, alcohol, peroxide, or other substance.    Apply Bacitracin ointment (over the counter) to incision twice daily.    Call your doctor or go to the Emergency Room for any signs of infection including: increased redness, drainage, pain or fever (temperature greater than or equal to 101.4).       Miscellaneous:  -You were started on a steroid (Decadron) taper while in the hospital. Please continue to take this medication as instructed until the course is completed.   -Please continue to take famotidine (Pepcid) to protect your stomach while on a steroid. Once you have completed the steroid course, you can stop taking the Pepcid.   -Follow up with Dr. Van for a wound check and pathology results. Appointment scheduled for 3/15/22 at 9:45 am.        [facility] Neurosurgery Office: [phone]              [facility] Neurosurgery Office: [phone]

## 2022-02-19 NOTE — PLAN OF CARE
Problem: Adult Inpatient Plan of Care  Goal: Plan of Care Review  Outcome: Ongoing, Progressing  Goal: Patient-Specific Goal (Individualized)  Outcome: Ongoing, Progressing  Goal: Optimal Comfort and Wellbeing  Outcome: Ongoing, Progressing  Goal: Readiness for Transition of Care  Outcome: Ongoing, Progressing    Problem: Infection  Goal: Absence of Infection Signs and Symptoms  Outcome: Ongoing, Progressing    POC reviewed with the patient and they verbalized understanding. All comments and concerns addressed. Bed locked in lowest position with bed alarm set, call light within reach. Safety precautions maintained. VSS, see flowsheets. No events this shift. Will continue to monitor for changes to POC and clinical condition.

## 2022-02-19 NOTE — DISCHARGE SUMMARY
[facility] - Neurosurgery ([facility])  Neurosurgery  Discharge Summary      Patient Name: Дмитрий Winn  MRN: [id number]  Admission Date: 2/17/2022  Hospital Length of Stay: 2 days  Discharge Date and Time:  02/19/2022 11:31 AM  Attending Physician: Chago Van MD   Discharging Provider: Karen Conte PA-C  Primary Care Provider: Primary Doctor No    HPI:   Mr. Дмитрий Winn is a 36 y.o. 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 Patient admittd to [facility] s/p frontal mass resection. Admit for higher level of care and neuro monitoring.      Procedure(s) (LRB):  CRANIOTOMY, USING FRAMELESS STEREOTAXY (Right)     Hospital Course: 2/18: OR yesterday for brain tumor removal. Tolerated procedure well, only mild headache this morning. Tolerating PO. Not OOB yet. Intact on exam. OK for step down to floor today  2/19: POD#2. NAEON. AFVSS. Pt doing well. Reports mild incisional pain, o/w no acute complaints. Neuro intact. Tolerating diet. Voiding, + flatus. Ambulating independently. Postop MRI shows GTR, expected postop blood products. Medically stable for discharge home today. Taper steroids over 3 weeks. Discussed postop and discharge instructions, all questions answered, postop appointment scheduled. Encouraged to call our office with any questions or concerns.      Physical Exam:    General: well developed, well nourished, no distress.   Head: normocephalic  GCS: E4 V5 M6; Total: 15  Mental Status: Awake, Alert, Oriented x 4  Language: No aphasia  Speech: No dysarthria  Cranial nerves: face symmetric, tongue midline, CN II-XII grossly intact.   Eyes: pupils equal, round, reactive to light with accomodation, EOMI.   Pulmonary: normal  respirations, no signs of respiratory distress  Abdomen: soft, non-distended, not tender to palpation  Skin: Skin is warm, dry and intact.  Sensory: intact to light touch throughout  Motor Strength: Moves all extremities spontaneously with good tone.  Full strength upper and lower extremities. No abnormal movements seen.   Finger-to-nose: intact bilaterally   Pronator drift: absent bilaterally    Frontal Cranial Incision: C/D/I with skin edges well approximated with staples. No surrounding erythema or edema. No drainage from incision. No palpable hematoma or underlying fluid collection.      Goals of Care Treatment Preferences:  Code Status: Full Code      Consults: N/A    Significant Diagnostic Studies: Labs:   BMP:   Recent Labs   Lab 02/18/22  0125 02/19/22  0242   * 93    142   K 3.8 3.6    105   CO2 21* 24   BUN 8 14   CREATININE 0.8 0.7   CALCIUM 9.2 9.1   , CMP   Recent Labs   Lab 02/18/22 0125 02/19/22  0242    142   K 3.8 3.6    105   CO2 21* 24   * 93   BUN 8 14   CREATININE 0.8 0.7   CALCIUM 9.2 9.1   ANIONGAP 11 13   ESTGFRAFRICA >60.0 >60.0   EGFRNONAA >60.0 >60.0   , CBC   Recent Labs   Lab 02/18/22 0125 02/19/22 0242   WBC 16.92* 15.48*   HGB 12.6* 11.7*   HCT 36.9* 34.7*    232   , INR   Lab Results   Component Value Date    INR 1.0 02/11/2022    INR 1.0 08/09/2018    and All labs within the past 24 hours have been reviewed  Radiology: MRI brain w/wo contrast    Pending Diagnostic Studies:     Procedure Component Value Units Date/Time    Specimen to Pathology, Surgery Neurosurgery [[id number]] Collected: 02/17/22 0944    Order Status: Sent Lab Status: In process Updated: 02/17/22 1716        Final Active Diagnoses:    Diagnosis Date Noted POA    PRINCIPAL PROBLEM:  Astrocytoma [C71.9] 11/01/2018 Yes    Right frontal lobe mass [G93.89] 08/08/2018 Yes      Problems Resolved During this Admission:      Discharged Condition: good     Disposition: Home or  Self Care    Follow Up:   Follow-up Information     Chago Van MD Follow up on 3/15/2022.    Specialties: Neurosurgery, Spine Surgery  Why: For wound re-check/staple removal, pathology results  Contact information:  [address]  [facility] [address] [phone]                       Patient Instructions:      Notify your health care provider if you experience any of the following:  temperature >100.4     Notify your health care provider if you experience any of the following:  persistent nausea and vomiting or diarrhea     Notify your health care provider if you experience any of the following:  severe uncontrolled pain     Notify your health care provider if you experience any of the following:  redness, tenderness, or signs of infection (pain, swelling, redness, odor or green/yellow discharge around incision site)     Notify your health care provider if you experience any of the following:  difficulty breathing or increased cough     Notify your health care provider if you experience any of the following:  severe persistent headache     Notify your health care provider if you experience any of the following:  worsening rash     Notify your health care provider if you experience any of the following:  persistent dizziness, light-headedness, or visual disturbances     Notify your health care provider if you experience any of the following:  increased confusion or weakness     Activity as tolerated     Medications:  Reconciled Home Medications:      Medication List      START taking these medications    dexAMETHasone 2 MG tablet  Commonly known as: DECADRON  Take 2 tablets (4 mg total) by mouth every 6 (six) hours for 3 days, THEN 2 tablets (4 mg total) every 8 (eight) hours for 3 days, THEN 2 tablets (4 mg total) 2 (two) times daily with meals for 3 days, THEN 1 tablet (2 mg total) every 8 (eight) hours for 3 days, THEN 1 tablet (2 mg total) 2 (two) times daily with meals for 3 days, THEN 1 tablet (2 mg  total) daily with breakfast for 6 days.  Start taking on: February 19, 2022     famotidine 20 MG tablet  Commonly known as: PEPCID  Take 1 tablet (20 mg total) by mouth 2 (two) times daily.     HYDROcodone-acetaminophen 5-325 mg per tablet  Commonly known as: NORCO  Take 1 tablet by mouth every 6 (six) hours as needed for Pain.        CONTINUE taking these medications    indomethacin 50 MG capsule  Commonly known as: INDOCIN  Take 1 capsule (50 mg total) by mouth 2 (two) times daily with meals.     levETIRAcetam 750 MG Tab  Commonly known as: KEPPRA  Take 1 tablet (750 mg total) by mouth 2 (two) times daily.            Karen Conte PA-C  Neurosurgery  [facility] - Neurosurgery [facility])

## 2022-02-19 NOTE — HPI
Mr. Дмитрий Winn is a 36 y.o. 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 Patient admittd to [facility] s/p frontal mass resection. Admit for higher level of care and neuro monitoring.

## 2022-02-21 LAB
BLD PROD TYP BPU: NORMAL
BLOOD UNIT EXPIRATION DATE: NORMAL
BLOOD UNIT TYPE CODE: 5100
BLOOD UNIT TYPE: NORMAL
CODING SYSTEM: NORMAL
DISPENSE STATUS: NORMAL
TRANS ERYTHROCYTES VOL PATIENT: NORMAL ML

## 2022-02-25 ENCOUNTER — PATIENT MESSAGE (OUTPATIENT)
Dept: NEUROSURGERY | Facility: CLINIC | Age: 37
End: 2022-02-25
Payer: COMMERCIAL

## 2022-03-08 ENCOUNTER — OFFICE VISIT (OUTPATIENT)
Dept: NEUROSURGERY | Facility: CLINIC | Age: 37
End: 2022-03-08
Payer: COMMERCIAL

## 2022-03-08 ENCOUNTER — OFFICE VISIT (OUTPATIENT)
Dept: RADIATION ONCOLOGY | Facility: CLINIC | Age: 37
End: 2022-03-08
Payer: COMMERCIAL

## 2022-03-08 ENCOUNTER — OFFICE VISIT (OUTPATIENT)
Dept: HEMATOLOGY/ONCOLOGY | Facility: CLINIC | Age: 37
End: 2022-03-08
Payer: COMMERCIAL

## 2022-03-08 VITALS
HEIGHT: 66 IN | BODY MASS INDEX: 25.23 KG/M2 | OXYGEN SATURATION: 99 % | RESPIRATION RATE: 16 BRPM | TEMPERATURE: 98 F | HEIGHT: 66 IN | HEART RATE: 63 BPM | TEMPERATURE: 98 F | SYSTOLIC BLOOD PRESSURE: 127 MMHG | HEART RATE: 63 BPM | WEIGHT: 157 LBS | BODY MASS INDEX: 25.23 KG/M2 | DIASTOLIC BLOOD PRESSURE: 84 MMHG | WEIGHT: 157 LBS | OXYGEN SATURATION: 99 % | DIASTOLIC BLOOD PRESSURE: 84 MMHG | SYSTOLIC BLOOD PRESSURE: 127 MMHG

## 2022-03-08 DIAGNOSIS — D84.821 IMMUNODEFICIENCY DUE TO DRUG THERAPY: ICD-10-CM

## 2022-03-08 DIAGNOSIS — C71.9 ASTROCYTOMA: Primary | ICD-10-CM

## 2022-03-08 DIAGNOSIS — G40.209 LOCALIZATION-RELATED (FOCAL) (PARTIAL) SYMPTOMATIC EPILEPSY AND EPILEPTIC SYNDROMES WITH COMPLEX PARTIAL SEIZURES, NOT INTRACTABLE, WITHOUT STATUS EPILEPTICUS: ICD-10-CM

## 2022-03-08 DIAGNOSIS — C71.9 ASTROCYTOMA BRAIN TUMOR: Primary | ICD-10-CM

## 2022-03-08 DIAGNOSIS — Z79.899 IMMUNODEFICIENCY DUE TO DRUG THERAPY: ICD-10-CM

## 2022-03-08 DIAGNOSIS — C71.1 MALIGNANT ASTROCYTOMA OF FRONTAL LOBE: Primary | ICD-10-CM

## 2022-03-08 DIAGNOSIS — C71.1 MALIGNANT ASTROCYTOMA OF FRONTAL LOBE: ICD-10-CM

## 2022-03-08 DIAGNOSIS — D64.9 NORMOCYTIC ANEMIA: ICD-10-CM

## 2022-03-08 PROCEDURE — 99999 PR PBB SHADOW E&M-EST. PATIENT-LVL IV: ICD-10-PCS | Mod: PBBFAC,,, | Performed by: RADIOLOGY

## 2022-03-08 PROCEDURE — 3074F PR MOST RECENT SYSTOLIC BLOOD PRESSURE < 130 MM HG: ICD-10-PCS | Mod: CPTII,S$GLB,, | Performed by: NEUROLOGICAL SURGERY

## 2022-03-08 PROCEDURE — 3008F PR BODY MASS INDEX (BMI) DOCUMENTED: ICD-10-PCS | Mod: CPTII,S$GLB,, | Performed by: RADIOLOGY

## 2022-03-08 PROCEDURE — 99215 OFFICE O/P EST HI 40 MIN: CPT | Mod: S$GLB,,, | Performed by: STUDENT IN AN ORGANIZED HEALTH CARE EDUCATION/TRAINING PROGRAM

## 2022-03-08 PROCEDURE — 1159F PR MEDICATION LIST DOCUMENTED IN MEDICAL RECORD: ICD-10-PCS | Mod: CPTII,S$GLB,, | Performed by: NEUROLOGICAL SURGERY

## 2022-03-08 PROCEDURE — 3074F PR MOST RECENT SYSTOLIC BLOOD PRESSURE < 130 MM HG: ICD-10-PCS | Mod: CPTII,S$GLB,, | Performed by: RADIOLOGY

## 2022-03-08 PROCEDURE — 3008F BODY MASS INDEX DOCD: CPT | Mod: CPTII,S$GLB,, | Performed by: RADIOLOGY

## 2022-03-08 PROCEDURE — 99205 OFFICE O/P NEW HI 60 MIN: CPT | Mod: S$GLB,,, | Performed by: RADIOLOGY

## 2022-03-08 PROCEDURE — 1111F DSCHRG MED/CURRENT MED MERGE: CPT | Mod: CPTII,S$GLB,, | Performed by: RADIOLOGY

## 2022-03-08 PROCEDURE — 3008F BODY MASS INDEX DOCD: CPT | Mod: CPTII,S$GLB,, | Performed by: NEUROLOGICAL SURGERY

## 2022-03-08 PROCEDURE — 3079F PR MOST RECENT DIASTOLIC BLOOD PRESSURE 80-89 MM HG: ICD-10-PCS | Mod: CPTII,S$GLB,, | Performed by: NEUROLOGICAL SURGERY

## 2022-03-08 PROCEDURE — 99215 PR OFFICE/OUTPT VISIT, EST, LEVL V, 40-54 MIN: ICD-10-PCS | Mod: S$GLB,,, | Performed by: STUDENT IN AN ORGANIZED HEALTH CARE EDUCATION/TRAINING PROGRAM

## 2022-03-08 PROCEDURE — 1159F MED LIST DOCD IN RCRD: CPT | Mod: CPTII,S$GLB,, | Performed by: NEUROLOGICAL SURGERY

## 2022-03-08 PROCEDURE — 99999 PR PBB SHADOW E&M-EST. PATIENT-LVL IV: CPT | Mod: PBBFAC,,, | Performed by: RADIOLOGY

## 2022-03-08 PROCEDURE — 3079F DIAST BP 80-89 MM HG: CPT | Mod: CPTII,S$GLB,, | Performed by: RADIOLOGY

## 2022-03-08 PROCEDURE — 3074F SYST BP LT 130 MM HG: CPT | Mod: CPTII,S$GLB,, | Performed by: RADIOLOGY

## 2022-03-08 PROCEDURE — 3008F PR BODY MASS INDEX (BMI) DOCUMENTED: ICD-10-PCS | Mod: CPTII,S$GLB,, | Performed by: NEUROLOGICAL SURGERY

## 2022-03-08 PROCEDURE — 1160F PR REVIEW ALL MEDS BY PRESCRIBER/CLIN PHARMACIST DOCUMENTED: ICD-10-PCS | Mod: CPTII,S$GLB,, | Performed by: NEUROLOGICAL SURGERY

## 2022-03-08 PROCEDURE — 99205 PR OFFICE/OUTPT VISIT, NEW, LEVL V, 60-74 MIN: ICD-10-PCS | Mod: S$GLB,,, | Performed by: RADIOLOGY

## 2022-03-08 PROCEDURE — 1159F PR MEDICATION LIST DOCUMENTED IN MEDICAL RECORD: ICD-10-PCS | Mod: CPTII,S$GLB,, | Performed by: RADIOLOGY

## 2022-03-08 PROCEDURE — 99999 PR PBB SHADOW E&M-EST. PATIENT-LVL IV: CPT | Mod: PBBFAC,,, | Performed by: NEUROLOGICAL SURGERY

## 2022-03-08 PROCEDURE — 99999 PR PBB SHADOW E&M-EST. PATIENT-LVL I: CPT | Mod: PBBFAC,,, | Performed by: STUDENT IN AN ORGANIZED HEALTH CARE EDUCATION/TRAINING PROGRAM

## 2022-03-08 PROCEDURE — 99024 POSTOP FOLLOW-UP VISIT: CPT | Mod: S$GLB,,, | Performed by: NEUROLOGICAL SURGERY

## 2022-03-08 PROCEDURE — 1111F PR DISCHARGE MEDS RECONCILED W/ CURRENT OUTPATIENT MED LIST: ICD-10-PCS | Mod: CPTII,S$GLB,, | Performed by: STUDENT IN AN ORGANIZED HEALTH CARE EDUCATION/TRAINING PROGRAM

## 2022-03-08 PROCEDURE — 1111F PR DISCHARGE MEDS RECONCILED W/ CURRENT OUTPATIENT MED LIST: ICD-10-PCS | Mod: CPTII,S$GLB,, | Performed by: RADIOLOGY

## 2022-03-08 PROCEDURE — 3074F SYST BP LT 130 MM HG: CPT | Mod: CPTII,S$GLB,, | Performed by: NEUROLOGICAL SURGERY

## 2022-03-08 PROCEDURE — 3079F DIAST BP 80-89 MM HG: CPT | Mod: CPTII,S$GLB,, | Performed by: NEUROLOGICAL SURGERY

## 2022-03-08 PROCEDURE — 99999 PR PBB SHADOW E&M-EST. PATIENT-LVL I: ICD-10-PCS | Mod: PBBFAC,,, | Performed by: STUDENT IN AN ORGANIZED HEALTH CARE EDUCATION/TRAINING PROGRAM

## 2022-03-08 PROCEDURE — 1159F MED LIST DOCD IN RCRD: CPT | Mod: CPTII,S$GLB,, | Performed by: RADIOLOGY

## 2022-03-08 PROCEDURE — 99024 PR POST-OP FOLLOW-UP VISIT: ICD-10-PCS | Mod: S$GLB,,, | Performed by: NEUROLOGICAL SURGERY

## 2022-03-08 PROCEDURE — 1160F RVW MEDS BY RX/DR IN RCRD: CPT | Mod: CPTII,S$GLB,, | Performed by: NEUROLOGICAL SURGERY

## 2022-03-08 PROCEDURE — 3079F PR MOST RECENT DIASTOLIC BLOOD PRESSURE 80-89 MM HG: ICD-10-PCS | Mod: CPTII,S$GLB,, | Performed by: RADIOLOGY

## 2022-03-08 PROCEDURE — 1111F DSCHRG MED/CURRENT MED MERGE: CPT | Mod: CPTII,S$GLB,, | Performed by: STUDENT IN AN ORGANIZED HEALTH CARE EDUCATION/TRAINING PROGRAM

## 2022-03-08 PROCEDURE — 99999 PR PBB SHADOW E&M-EST. PATIENT-LVL IV: ICD-10-PCS | Mod: PBBFAC,,, | Performed by: NEUROLOGICAL SURGERY

## 2022-03-08 RX ORDER — SULFAMETHOXAZOLE AND TRIMETHOPRIM 800; 160 MG/1; MG/1
TABLET ORAL
Qty: 18 TABLET | Refills: 0 | Status: SHIPPED | OUTPATIENT
Start: 2022-03-08 | End: 2022-05-30

## 2022-03-08 RX ORDER — PROCHLORPERAZINE MALEATE 10 MG
10 TABLET ORAL 3 TIMES DAILY PRN
Qty: 30 TABLET | Refills: 3 | Status: SHIPPED | OUTPATIENT
Start: 2022-03-08 | End: 2022-09-27

## 2022-03-08 RX ORDER — TEMOZOLOMIDE 140 MG/1
140 CAPSULE ORAL DAILY
Qty: 42 CAPSULE | Refills: 0 | Status: SHIPPED | OUTPATIENT
Start: 2022-03-08 | End: 2022-06-10

## 2022-03-08 RX ORDER — ONDANSETRON HYDROCHLORIDE 8 MG/1
8 TABLET, FILM COATED ORAL EVERY 8 HOURS PRN
Qty: 30 TABLET | Refills: 3 | Status: SHIPPED | OUTPATIENT
Start: 2022-03-08 | End: 2022-08-10 | Stop reason: SDUPTHER

## 2022-03-08 NOTE — PLAN OF CARE
START OFF PATHWAY REGIMEN - Neuro            Temozolomide (Temodar)           Additional Orders: PJP prophylaxis is required for all patients. See PI   for details. Temozolomide is given continually during radiation therapy (RT)   including on weekend days. Administer 1 hour prior to radiation on days with RT.    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

## 2022-03-08 NOTE — ASSESSMENT & PLAN NOTE
Mild normocytic anemia. Suspect related to blood loss since he did not have any specific symptoms prior to surgery.  Asymptomatic.  -continue to monitor with cbc

## 2022-03-08 NOTE — PROGRESS NOTES
03/08/2022    Radiation Oncology Consultation    Assessment   This is a 36 y.o. 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 Paulson in 8/2018 with recommendation made for observation at that time. He has had gradual increase in size of Right frontal mass over [id number] and underwent STR by Dr. Van 2/17/22. He is referred for consideration of adjuvant therapy.     I reviewed the natural history and treatment options for low grade glioma with the patient. I discussed that older trials demonstrated that observation (delayed treatment until progression) may be reasonable for some patients, but newer trials have allowed us to assess risk categories based on IHC and genomic classifications. EORTC 49541-46668 (comparing RT alone to Temozolomide alone) found that median progression free survival for patients with IDH mutant was 3 years with TMZ alone and 4.5 years with RT alone. The phase II RTOG 0424 trial of RT + TMZ for high risk patients with Low Grade Glioma reported median PFS of 6.4 years for patients with intermediate risk LGG (not reported by IDH status). Given this day and the fact that his disease has now recurred with observation alone, I recommend definitive treatment at this time with radiation +TMZ followed by maintenance TMZ. I would treat T2 FLAIR plus margin to 54 Gy over 30 fractions using IMRT to limit dose to uninvolved brain and scalp. Potential short- and long-term side effects of radiotherapy were reviewed.    Given low grade disease, we discussed timing of treatment. He has an event coming up in early May and would prefer to start treatment after that, which I think is very reasonable. I will hold off on ordering his planning scans until the last week of April.        Plan   1) Schedule MRI Brain and CT Simulation for radiation treatment planning the last week of April 2022.        CHIEF COMPLAINT: Recurrent Right frontal low grade glioma    HPI:   Tatum is a 35 y/o male with h/o Right frontal WHO grade 2 astrocytoma (IDH-mut) s/p STR by Dr. Paulson 8/10/18. Observation was recommended at the time. Over the past year he has had gradual enlargement of Right frontal mass. On 2/17/22 he underwent resection by Dr. Van with pathology confirming original diagnosis and no evidence of increase in grade. Post-op MRI 2/17/22 concerning for thin rim of residual disease. He is referred for consideration of adjuvant therapy.     In clinic today, he is accompanied by his mother. He is feeling well after surgery and denies HA, focal numbness or weakness, gait imbalance, or recent seizures. He remains on Keppra due to seizures in the past.       Prior Radiation History: None    Past Medical History:   Diagnosis Date    Ataxia 5/29/2020    Cancer     brain tumor    Epilepsy        Past Surgical History:   Procedure Laterality Date    CRANIOTOMY FOR EXCISION OF INTRACRANIAL TUMOR Right 8/10/2018    Procedure: CRANIOTOMY, FOR INTRACRANIAL NEOPLASM EXCISION Right eyebrow incision / LAYNE BrainPath craniotomy for tumor resection;  Surgeon: Lefty Paulson MD;  Location: [facility];  Service: Neurosurgery;  Laterality: Right;    CRANIOTOMY USING FRAMELESS STEREOTAXY Right 2/17/2022    Procedure: CRANIOTOMY, USING FRAMELESS STEREOTAXY;  Surgeon: Chago Van MD;  Location: [address];  Service: Neurosurgery;  Laterality: Right;    cyst removed      SURGICAL REMOVAL OF PILONIDAL CYST         Social History     Tobacco Use    Smoking status: Former Smoker     Packs/day: 1.00     Years: 8.00     Pack years: 8.00     Types: Cigarettes     Start date: 2010     Quit date: 8/14/2018     Years since quitting: 3.5    Smokeless tobacco: Never Used   Substance Use Topics    Alcohol use: Yes     Alcohol/week: 42.0 standard drinks     Types: 42 Cans of beer per week     Comment: 6 pack daily     Drug use: No       Cancer-related family history includes Breast cancer in his  "paternal grandmother; Cancer in his maternal grandfather and paternal grandmother; Prostate cancer in his maternal grandfather.    Current Outpatient Medications on File Prior to Visit   Medication Sig Dispense Refill    dexAMETHasone (DECADRON) 2 MG tablet Take 2 tabs by mouth every 6 hours x 3 days, 2 tabs every 8 hours x 3 days, 2 tabs twice daily with meals x 3 days, 1 tab every 8 hours x 3 days, 1 tab twice daily with meals x 3 days, 1 tab daily w/ breakfast x 6 days 75 tablet 0    famotidine (PEPCID) 20 MG tablet Take 1 tablet (20 mg total) by mouth 2 (two) times daily. 60 tablet 0    HYDROcodone-acetaminophen (NORCO) 5-325 mg per tablet Take 1 tablet by mouth every 6 (six) hours as needed for Pain. 50 tablet 0    indomethacin (INDOCIN) 50 MG capsule Take 1 capsule (50 mg total) by mouth 2 (two) times daily with meals. 90 capsule 0    levETIRAcetam (KEPPRA) 750 MG Tab Take 1 tablet (750 mg total) by mouth 2 (two) times daily. 60 tablet 1     No current facility-administered medications on file prior to visit.       Review of patient's allergies indicates:  No Known Allergies    Review of Systems   Constitutional: Negative for fever and weight loss.   HENT: Negative for ear pain and sore throat.    Eyes: Negative for blurred vision and double vision.   Respiratory: Negative for cough, hemoptysis and shortness of breath.    Cardiovascular: Negative for chest pain and leg swelling.   Gastrointestinal: Negative for abdominal pain, constipation, diarrhea, heartburn and nausea.   Genitourinary: Negative for dysuria and hematuria.   Musculoskeletal: Negative for falls and joint pain.   Neurological: Negative for tingling, speech change, focal weakness, seizures and headaches.   Psychiatric/Behavioral: Negative for depression. The patient is not nervous/anxious.         Vital Signs: /84   Pulse 63   Temp 98.2 °F (36.8 °C)   Resp 16   Ht 5' 6" (1.676 m)   Wt 71.2 kg (157 lb)   SpO2 99%   BMI 25.34 " kg/m²     ECOG Performance Status: 0 - Fully Active    Physical Exam  Vitals reviewed.   Constitutional:       Appearance: Normal appearance.   HENT:      Head: Normocephalic.      Comments: Incision without erythema or drainage  Eyes:      General: No scleral icterus.     Extraocular Movements: Extraocular movements intact.   Pulmonary:      Effort: Pulmonary effort is normal. No respiratory distress.   Abdominal:      General: There is no distension.   Musculoskeletal:      Cervical back: Neck supple.   Lymphadenopathy:      Cervical: No cervical adenopathy.   Skin:     General: Skin is warm and dry.   Neurological:      General: No focal deficit present.      Mental Status: He is alert and oriented to person, place, and time.      Cranial Nerves: No cranial nerve deficit.   Psychiatric:         Mood and Affect: Mood normal.         Behavior: Behavior normal.         Judgment: Judgment normal.          Labs:    Imaging: I have personally reviewed the patient's available images and reports and summarized pertinent findings above in HPI.     Pathology: I have personally reviewed the patient's available pathology and summarized pertinent findings above in HPI.       - Thank you for allowing me to participate in the care of your patient.    Azael Hodges MD, PhD

## 2022-03-08 NOTE — PROGRESS NOTES
PATIENT: Дмитрий Winn  MRN: [id number]  DATE: 3/8/2022      Diagnosis:   1. Astrocytoma brain tumor    2. Malignant astrocytoma of frontal lobe    3. Localization-related (focal) (partial) symptomatic epilepsy and epileptic syndromes with complex partial seizures, not intractable, without status epilepticus    4. Immunodeficiency due to drug therapy    5. Normocytic anemia        Chief Complaint: Brain Tumor      Oncologic History:    Oncologic History 1. Astrocytoma, IDH mutated    Oncologic Treatment 1. 8/2018 STR  2. 2/17/22 STR    Pathology 1. 8/10/2018. Pathology: Diffuse Astrocytoma IDH1 and ATRX mutated. P53 over expressed, WHO Grade II        Subjective:    Interval History: Mr. Winn is here for new patient consultation.  He is a former patient of Dr. Coronado.    36 year old man with history of epilepsy and initial diagnosis of astrocytoma, WHO grade II s/p STR 8/2018.  When he was first diagnosed, he [place] with [place].  He was followed with surveillance with recent MRI 1/29/22 showing interval growth of the right frontal lesion with mild increased mass effect; patient at his follow up visit with Dr. Van was asymptomatic at the time.  Decision was made to perform craniotomy.  2/17/22 he had a STR and final pathology showed WHO grade II astrocytoma, IDH mutated.  Neuro-oncology expanded gene panel is pending.    He is doing well after his surgery.  He had some headaches but they are improving. He is on a steroid taper.  Denies any focal neuro symptoms or seizures.  He does have some difficulty sleeping since the surgery and since he's been on steroids.  No other symptoms to report.    He currently works as a del real. He lives on the [place].      His mother accompanies him at this visit today.    Past Medical History:   Past Medical History:   Diagnosis Date    Ataxia 5/29/2020    Cancer     brain tumor    Epilepsy        Past Surgical HIstory:    Past Surgical History:   Procedure Laterality Date    CRANIOTOMY FOR EXCISION OF INTRACRANIAL TUMOR Right 8/10/2018    Procedure: CRANIOTOMY, FOR INTRACRANIAL NEOPLASM EXCISION Right eyebrow incision / LAYNE BrainPath craniotomy for tumor resection;  Surgeon: Lefty Paulson MD;  Location: [facility];  Service: Neurosurgery;  Laterality: Right;    CRANIOTOMY USING FRAMELESS STEREOTAXY Right 2/17/2022    Procedure: CRANIOTOMY, USING FRAMELESS STEREOTAXY;  Surgeon: Chago Van MD;  Location: [facility];  Service: Neurosurgery;  Laterality: Right;    cyst removed      SURGICAL REMOVAL OF PILONIDAL CYST         Family History:   Family History   Problem Relation Age of Onset    Cancer Maternal Grandfather     Prostate cancer Maternal Grandfather     Cancer Paternal Grandmother     Breast cancer Paternal Grandmother     Hypertension Mother     No Known Problems Sister     No Known Problems Brother        Social History:  reports that he quit smoking about 3 years ago. His smoking use included cigarettes. He started smoking about 12 years ago. He has a 8.00 pack-year smoking history. He has never used smokeless tobacco. He reports current alcohol use of about 42.0 standard drinks of alcohol per week. He reports that he does not use drugs.    Allergies:  Review of patient's allergies indicates:  No Known Allergies    Medications:  Current Outpatient Medications   Medication Sig Dispense Refill    dexAMETHasone (DECADRON) 2 MG tablet Take 2 tabs by mouth every 6 hours x 3 days, 2 tabs every 8 hours x 3 days, 2 tabs twice daily with meals x 3 days, 1 tab every 8 hours x 3 days, 1 tab twice daily with meals x 3 days, 1 tab daily w/ breakfast x 6 days 75 tablet 0    famotidine (PEPCID) 20 MG tablet Take 1 tablet (20 mg total) by mouth 2 (two) times daily. 60 tablet 0    HYDROcodone-acetaminophen (NORCO) 5-325 mg per tablet Take 1 tablet by mouth every 6 (six) hours as needed for Pain. 50 tablet 0     indomethacin (INDOCIN) 50 MG capsule Take 1 capsule (50 mg total) by mouth 2 (two) times daily with meals. 90 capsule 0    levETIRAcetam (KEPPRA) 750 MG Tab Take 1 tablet (750 mg total) by mouth 2 (two) times daily. 60 tablet 1    ondansetron (ZOFRAN) 8 MG tablet Take 1 tablet (8 mg total) by mouth every 8 (eight) hours as needed for Nausea. 30 tablet 3    prochlorperazine (COMPAZINE) 10 MG tablet Take 1 tablet (10 mg total) by mouth 3 (three) times daily as needed (use when zofran does not work). 30 tablet 3    sulfamethoxazole-trimethoprim 800-160mg (BACTRIM DS) 800-160 mg Tab Take 1 tablet by mouth on Mondays, Wednesdays, and Fridays. 18 tablet 0     No current facility-administered medications for this visit.       Review of Systems   Constitutional: Negative for activity change, appetite change, chills, diaphoresis, fatigue, fever and unexpected weight change.   HENT: Negative for nosebleeds and trouble swallowing.    Eyes: Negative for visual disturbance.   Respiratory: Negative for cough, chest tightness, shortness of breath and wheezing.    Cardiovascular: Negative for chest pain and leg swelling.   Gastrointestinal: Negative for abdominal distention, abdominal pain, blood in stool, constipation, diarrhea, nausea and vomiting.   Endocrine: Negative for cold intolerance and heat intolerance.   Genitourinary: Negative for difficulty urinating and dysuria.   Musculoskeletal: Negative for arthralgias and back pain.   Skin: Negative for color change.   Neurological: Positive for headaches. Negative for dizziness, weakness, light-headedness and numbness.   Hematological: Negative for adenopathy. Does not bruise/bleed easily.   Psychiatric/Behavioral: Positive for sleep disturbance. Negative for confusion.       ECOG Performance Status:     ECOG SCORE    0 - Fully active-able to carry on all pre-disease performance without restriction         Objective:      Vitals: There were no vitals filed for this  "visit.  BMI: There is no height or weight on file to calculate BMI.     /84 (BP Location: Left arm, Patient Position: Sitting, BP Method: Medium (Automatic))   Pulse 63   Temp 98.2 °F (36.8 °C) (Temporal)   Ht 5' 6" (1.676 m)   Wt 71.2 kg (157 lb)   SpO2 99%   BMI 25.34 kg/m²   BSA 1.82 m²   Pain Sc 0-No pain         Physical Exam  Constitutional:       General: He is not in acute distress.     Appearance: Normal appearance. He is not ill-appearing.   HENT:      Head: Normocephalic.      Comments: Surgical incision site c/d/i     Mouth/Throat:      Pharynx: No oropharyngeal exudate or posterior oropharyngeal erythema.   Eyes:      General: No scleral icterus.     Extraocular Movements: Extraocular movements intact.      Conjunctiva/sclera: Conjunctivae normal.      Pupils: Pupils are equal, round, and reactive to light.   Cardiovascular:      Rate and Rhythm: Normal rate and regular rhythm.      Heart sounds: No murmur heard.    No friction rub. No gallop.   Pulmonary:      Effort: Pulmonary effort is normal. No respiratory distress.      Breath sounds: No stridor. No wheezing, rhonchi or rales.   Abdominal:      General: Bowel sounds are normal. There is no distension.      Palpations: Abdomen is soft. There is no mass.      Tenderness: There is no abdominal tenderness. There is no guarding or rebound.   Musculoskeletal:         General: Normal range of motion.      Cervical back: Normal range of motion and neck supple.      Right lower leg: No edema.      Left lower leg: No edema.   Skin:     General: Skin is warm and dry.   Neurological:      General: No focal deficit present.      Mental Status: He is alert.         Laboratory Data:    Latest Reference Range & Units 02/19/22 02:42   WBC 3.90 - 12.70 K/uL 15.48 (H)   RBC 4.60 - 6.20 M/uL 3.93 (L)   Hemoglobin 14.0 - 18.0 g/dL 11.7 (L)   Hematocrit 40.0 - 54.0 % 34.7 (L)   MCV 82 - 98 fL 88   MCH 27.0 - 31.0 pg 29.8   MCHC 32.0 - 36.0 g/dL 33.7   RDW 11.5 - " 14.5 % 12.2   Platelets 150 - 450 K/uL 232   MPV 9.2 - 12.9 fL 13.5 (H)   Gran % 38.0 - 73.0 % 81.7 (H)   Lymph % 18.0 - 48.0 % 5.9 (L)   Mono % 4.0 - 15.0 % 11.6   Eosinophil % 0.0 - 8.0 % 0.0   Basophil % 0.0 - 1.9 % 0.1   Immature Granulocytes 0.0 - 0.5 % 0.7 (H)   Gran # (ANC) 1.8 - 7.7 K/uL 12.6 (H)   Lymph # 1.0 - 4.8 K/uL 0.9 (L)   Mono # 0.3 - 1.0 K/uL 1.8 (H)   Eos # 0.0 - 0.5 K/uL 0.0   Baso # 0.00 - 0.20 K/uL 0.02   Immature Grans (Abs) 0.00 - 0.04 K/uL 0.11 (H) [1]   NRBC 0 /100 WBC 0   Differential Method  Automated   Sodium 136 - 145 mmol/L 142   Potassium 3.5 - 5.1 mmol/L 3.6   Chloride 95 - 110 mmol/L 105   CO2 23 - 29 mmol/L 24   Anion Gap 8 - 16 mmol/L 13   BUN 6 - 20 mg/dL 14   Creatinine 0.5 - 1.4 mg/dL 0.7   EGFR if non African American >60 mL/min/1.73 m^2 >60.0 [2]   EGFR if African American >60 mL/min/1.73 m^2 >60.0   Glucose 70 - 110 mg/dL 93   Calcium 8.7 - 10.5 mg/dL 9.1       Imaging:     MRI Brain W WO Contrast    Result Date: 2/17/2022  EXAMINATION: MRI BRAIN W WO CONTRAST CLINICAL HISTORY: Brain/CNS neoplasm, assess treatment response; TECHNIQUE: Sagittal and axial T1, axial T2, axial FLAIR, axial gradient, axial diffusion imaging of the whole brain pre-contrast with postcontrast axial T1 and axial spoiled gradient imaging reformatted in the coronal and sagittal planes.. 8 ml of Gadavist injected intravenously. COMPARISON: 01/29/2022 FINDINGS: Operative changes status post right frontal craniotomy for right inferior frontal lesion resection.  There is thin margin of diffusion restriction about the resection cavity.  T2 flair signal hyperintensity along the margin which may in part represent post treatment change and edema with small component of residual lesion along the posterior and lateral aspect of the resection cavity cannot be excluded.  Clinical correlation correlation with surgical pathology advised.  This measures approximately 0.4 cm in thickness image 14 series 12.  There is  T1 signal hyperintensity along the margin resection cavity with susceptibility compatible with postoperative blood products.  There is postoperative gas fluid and hemorrhage within the resection cavity. Please note there is small postoperative pneumocephalus overlying the right frontal lobe. There is slight reduced mass effect from the lesion resection with partial re-expansion right frontal horn lateral ventricle without evidence for hydrocephalus.  There is continued slight leftward midline shift although also improved of the left frontal lobe currently measuring 4 mm previously measuring 8 mm. Major intracranial T2 flow voids are present.  Mild moderate patchy ethmoid air cell opacities. Incidental probable punctate developmental venous anomaly within the right inferior frontal lobe unchanged. This report was flagged in Epic as abnormal.     Interval operative change right frontal craniotomy and right frontal lesion resection.  Expected postoperative gas fluid and hemorrhage within the resection cavity as well as postoperative ischemia and hemorrhage along the margin of the resection cavity in the right frontal lobe. Please note there is a thin margin of T2 FLAIR hyperintensity along the posterolateral aspect of the resection cavity while this may represent post treatment change, however component of nonenhancing residual lesion not excluded and correlation with surgical pathology intraoperative findings advised. No evidence for hydrocephalus or acute infarction remote from the resection cavity Electronically signed by: Duc Lynch DO Date:    02/17/2022 Time:    16:13    Assessment:       1. Astrocytoma brain tumor    2. Malignant astrocytoma of frontal lobe    3. Localization-related (focal) (partial) symptomatic epilepsy and epileptic syndromes with complex partial seizures, not intractable, without status epilepticus    4. Immunodeficiency due to drug therapy    5. Normocytic anemia           Plan:        Problem List Items Addressed This Visit        Neuro    Localization-related (focal) (partial) symptomatic epilepsy and epileptic syndromes with complex partial seizures, not intractable, without status epilepticus    Current Assessment & Plan     No recent seizures. Currently on keppra  -continue keppra              Oncology    Malignant astrocytoma of frontal lobe    Current Assessment & Plan     Recurrent WHO grade II astrocytoma, IDH mutant s/p STR 2/17/22.  Case reviewed at neuro-onc tumor board 3/8/22.  -Discussed with him plan for chemoradiation with temozolomide followed by maintenance temozolomide for up to 12 cycles.  -Bactrim ppx M/W/F while receiving TMZ+RT.  -Weekly CBC and CMP while receiving TMZ+RT.  -Follow up with me prior to cycle 1  -continue decadron taper per neurosurgery/radiation oncology.  -Zofran and compazine prn nausea.             Relevant Medications    prochlorperazine (COMPAZINE) 10 MG tablet    ondansetron (ZOFRAN) 8 MG tablet    sulfamethoxazole-trimethoprim 800-160mg (BACTRIM DS) 800-160 mg Tab    Normocytic anemia    Current Assessment & Plan     Mild normocytic anemia. Suspect related to blood loss since he did not have any specific symptoms prior to surgery.  Asymptomatic.  -continue to monitor with cbc              Other    Immunodeficiency due to drug therapy    Current Assessment & Plan     Temozolomide can cause neutropenia.   -monitor cbc while on treatment             Other Visit Diagnoses     Astrocytoma brain tumor    -  Primary    Relevant Medications    prochlorperazine (COMPAZINE) 10 MG tablet    ondansetron (ZOFRAN) 8 MG tablet    sulfamethoxazole-trimethoprim 800-160mg (BACTRIM DS) 800-160 mg Tab          No orders of the defined types were placed in this encounter.    Route Chart for Scheduling    Med Onc Chart Routing      Follow up with physician Other. 4/28   Follow up with [facility]ARA    Labs CMP and CBC   Lab interval:  4/28 labs   Imaging None      Pharmacy  appointment No pharmacy appointment needed      Other referrals No additional referrals needed         Treatment Plan Information   OP TEMOZOLOMIDE DAILY (6 WEEKS) WITH CONCURRENT RADIATION   Sha Pan MD   Upcoming Treatment Dates - OP TEMOZOLOMIDE DAILY (6 WEEKS) WITH CONCURRENT RADIATION    No upcoming days in selected categories.          Sha Pan MD  Hematology Oncology

## 2022-03-08 NOTE — PROGRESS NOTES
Subjective:   I, Mahi Mayer, attest that this documentation has been prepared under the direction and in the presence of Chago Van MD.     Patient ID: Дмитрий Winn is a 36 y.o. male     Chief Complaint: No chief complaint on file.          HPI  Mr. Дмитрий Winn is a 36 y.o. 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  This is a pt who [place] [facility] on 8/[place]/2018, for a seizure. Neuroimaging showed a large frontal mass with an unusual appearance suspicious for low grade glioma or neuroglial cyst. A right supraorbital frontal craniotomy and resection of gyrus rectus tumor was done on 8/10/2018. 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 He had continued to be monitored and followed up with MRI brain frequently. The pt presented to me on 3/9/21 for recent MRI brain that revealed increased in size glioma. The pt denied any symptoms. MRI Brain (Tumor with Perfusion) W WO Contrast (1/29/2022): There is a mild increase in size of the right frontal lesion when compared to the prior study with mild increased mass effect. No surrounding signal abnormality in the adjacent white matter is noted. Minimal enhancement within the posterosuperior portion of the lesion is questioned although this may reflect additional prominent internal vasculature. MR perfusion demonstrates mild increased signal on cerebral blood volume maps centrally within the lesion. I have recommended craniotomy.    Today the pt reports he has been doing well since the surgery. He denies having any pain at this time.   The pt is to be seen by Dr. Hodges and Dr. Pan today.     Review of Systems   Constitutional: Negative for activity change, appetite change, fatigue, fever and unexpected  weight change.   HENT: Negative for facial swelling.    Eyes: Negative.    Respiratory: Negative.    Cardiovascular: Negative.    Gastrointestinal: Negative for diarrhea, nausea and vomiting.   Endocrine: Negative.    Genitourinary: Negative.    Musculoskeletal: Negative for back pain, joint swelling, myalgias and neck pain.   Neurological: Negative for dizziness, seizures, weakness, numbness and headaches.   Psychiatric/Behavioral: Negative.       Past Medical History:   Diagnosis Date    Ataxia 5/29/2020    Cancer     brain tumor    Epilepsy        Objective:      Vitals:    03/08/22 0901   BP: 127/84   Pulse: 63   Temp: 98.2 °F (36.8 °C)      Physical Exam  Constitutional:       General: He is not in acute distress.     Appearance: Normal appearance.   HENT:      Head: Normocephalic and atraumatic.      Comments: Surgical incision site is dry, clean, and intact with staples in place.  Pulmonary:      Effort: Pulmonary effort is normal.   Musculoskeletal:      Cervical back: Neck supple.   Neurological:      Mental Status: He is alert and oriented to person, place, and time.      GCS: GCS eye subscore is 4. GCS verbal subscore is 5. GCS motor subscore is 6.      Cranial Nerves: No cranial nerve deficit.       Pathology   2- Brain, right frontal lobe, craniotomy:  - Astrocytoma, IDH-mutant, residual CNS WHO Grade 2     IMAGING:  MRI Brain W WO Contrast (2/17/2022): Interval operative change right frontal craniotomy and right frontal lesion resection. Expected postoperative gas fluid and hemorrhage within the resection cavity as well as postoperative ischemia and hemorrhage along the margin of the resection cavity in the right frontal lobe. Please note there is a thin margin of T2 FLAIR hyperintensity along the posterolateral aspect of the resection cavity while this may represent post treatment change, however component of nonenhancing residual lesion not excluded and correlation with surgical pathology  intraoperative findings advised. No evidence for hydrocephalus or acute infarction remote from the resection cavity.    I have personally reviewed the images with the pt.      I, Dr. Chago Van, personally performed the services described in this documentation. All medical record entries made by the scribe, Mahi Mayer, were at my direction and in my presence.  I have reviewed the chart and agree that the record reflects my personal performance and is accurate and complete. Chago Van MD. 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 Expected postoperative gas fluid and hemorrhage within the resection cavity as well as postoperative ischemia and hemorrhage along the margin of the resection cavity in the right frontal lobe. Please note there is a thin margin of T2 FLAIR hyperintensity along the posterolateral aspect of the resection cavity while this may represent post treatment change, however component of nonenhancing residual lesion not excluded and correlation with surgical pathology intraoperative findings advised. No evidence for hydrocephalus or acute infarction remote from the resection cavity.    Your staples were removed today.     Pt is to be seen by Dr. Azael Hodges, radiation oncology and Dr. Pan, hematology/oncology today.     I have reviewed the pathology report with the pt which is positive for astrocytoma, IDH-mutant, residual CNS WHO Grade 2.      I will schedule the patient for 3 month follow up with MRI brain.

## 2022-03-08 NOTE — ASSESSMENT & PLAN NOTE
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  Case reviewed at neuro-onc tumor board 3/8/22.  -Discussed with him plan for chemoradiation with temozolomide followed by maintenance temozolomide for up to 12 cycles.  -Bactrim ppx M/W/F while receiving TMZ+RT.  -Weekly CBC and CMP while receiving TMZ+RT.  -Follow up with me prior to cycle 1  -continue decadron taper per neurosurgery/radiation oncology.  -Zofran and compazine prn nausea.

## 2022-03-08 NOTE — PATIENT INSTRUCTIONS
I have personally reviewed the MRI brain with the pt which shows interval operative change right frontal craniotomy and right frontal lesion resection. Expected postoperative gas fluid and hemorrhage within the resection cavity as well as postoperative ischemia and hemorrhage along the margin of the resection cavity in the right frontal lobe. Please note there is a thin margin of T2 FLAIR hyperintensity along the posterolateral aspect of the resection cavity while this may represent post treatment change, however component of nonenhancing residual lesion not excluded and correlation with surgical pathology intraoperative findings advised. No evidence for hydrocephalus or acute infarction remote from the resection cavity.    Your staples were removed today.     Pt is to be seen by Dr. Azael Hodges, radiation oncology and Dr. Pan, hematology/oncology today.     I have reviewed the pathology report with the pt which is positive for astrocytoma, IDH-mutant, residual CNS WHO Grade 2.      I will schedule the patient for 3 month follow up with MRI brain.

## 2022-03-09 LAB
FINAL PATHOLOGIC DIAGNOSIS: NORMAL
FROZEN SECTION DIAGNOSIS: NORMAL
GROSS: NORMAL
Lab: NORMAL
MICROSCOPIC EXAM: NORMAL
SUPPLEMENTAL DIAGNOSIS: NORMAL

## 2022-03-18 ENCOUNTER — PATIENT MESSAGE (OUTPATIENT)
Dept: NEUROLOGY | Facility: CLINIC | Age: 37
End: 2022-03-18
Payer: COMMERCIAL

## 2022-03-18 DIAGNOSIS — G40.209 LOCALIZATION-RELATED (FOCAL) (PARTIAL) SYMPTOMATIC EPILEPSY AND EPILEPTIC SYNDROMES WITH COMPLEX PARTIAL SEIZURES, NOT INTRACTABLE, WITHOUT STATUS EPILEPTICUS: Primary | ICD-10-CM

## 2022-03-18 RX ORDER — LEVETIRACETAM 750 MG/1
750 TABLET ORAL 2 TIMES DAILY
Qty: 60 TABLET | Refills: 1 | Status: SHIPPED | OUTPATIENT
Start: 2022-03-18 | End: 2022-05-18 | Stop reason: SDUPTHER

## 2022-03-18 NOTE — TELEPHONE ENCOUNTER
Pt requesting refill of Keppra 750mg BID. Pt last seen 7/24/2020. Pt had to reschedule his 2/24/22 appt due to surgery. Message sent to pt to reschedule appt.

## 2022-03-23 ENCOUNTER — SPECIALTY PHARMACY (OUTPATIENT)
Dept: PHARMACY | Facility: CLINIC | Age: 37
End: 2022-03-23
Payer: COMMERCIAL

## 2022-03-23 NOTE — TELEPHONE ENCOUNTER
Rx received for Temodar. Submitted PA (Key: [id number]), but drug is not covered on plan. Forwarding to BI/FA for further assistance.

## 2022-03-23 NOTE — TELEPHONE ENCOUNTER
BI Complete Temozolomide     Per [facility] website    OOP  1900  ; met  Deductible  100   ; met  Copay $0 - 28 caps/28 DS   - NDC must be [phone] to not have loss  Contacted [employer] in fulfillment to order 2 bottles of 14 count  [facility] in network  No FA needed

## 2022-04-04 ENCOUNTER — SPECIALTY PHARMACY (OUTPATIENT)
Dept: PHARMACY | Facility: CLINIC | Age: 37
End: 2022-04-04
Payer: COMMERCIAL

## 2022-04-04 NOTE — TELEPHONE ENCOUNTER
Outgoing call to pt for initial consult on Temodar. Pt stated starts radiation 4/28. We will call  to start date for initial consult

## 2022-04-18 ENCOUNTER — PATIENT MESSAGE (OUTPATIENT)
Dept: ADMINISTRATIVE | Facility: OTHER | Age: 37
End: 2022-04-18
Payer: COMMERCIAL

## 2022-04-21 ENCOUNTER — SPECIALTY PHARMACY (OUTPATIENT)
Dept: PHARMACY | Facility: CLINIC | Age: 37
End: 2022-04-21
Payer: COMMERCIAL

## 2022-04-21 ENCOUNTER — PATIENT MESSAGE (OUTPATIENT)
Dept: PHARMACY | Facility: CLINIC | Age: 37
End: 2022-04-21
Payer: COMMERCIAL

## 2022-04-28 ENCOUNTER — OFFICE VISIT (OUTPATIENT)
Dept: HEMATOLOGY/ONCOLOGY | Facility: CLINIC | Age: 37
End: 2022-04-28
Payer: COMMERCIAL

## 2022-04-28 ENCOUNTER — HOSPITAL ENCOUNTER (OUTPATIENT)
Dept: RADIATION THERAPY | Facility: HOSPITAL | Age: 37
Discharge: HOME | End: 2022-04-28
Attending: RADIOLOGY
Payer: COMMERCIAL

## 2022-04-28 ENCOUNTER — HOSPITAL ENCOUNTER (OUTPATIENT)
Dept: RADIOLOGY | Facility: HOSPITAL | Age: 37
Discharge: HOME | End: 2022-04-28
Attending: RADIOLOGY
Payer: COMMERCIAL

## 2022-04-28 ENCOUNTER — PATIENT MESSAGE (OUTPATIENT)
Dept: HEMATOLOGY/ONCOLOGY | Facility: CLINIC | Age: 37
End: 2022-04-28

## 2022-04-28 VITALS
HEIGHT: 66 IN | DIASTOLIC BLOOD PRESSURE: 86 MMHG | SYSTOLIC BLOOD PRESSURE: 155 MMHG | HEART RATE: 69 BPM | BODY MASS INDEX: 26.14 KG/M2 | WEIGHT: 162.69 LBS

## 2022-04-28 DIAGNOSIS — G40.209 LOCALIZATION-RELATED (FOCAL) (PARTIAL) SYMPTOMATIC EPILEPSY AND EPILEPTIC SYNDROMES WITH COMPLEX PARTIAL SEIZURES, NOT INTRACTABLE, WITHOUT STATUS EPILEPTICUS: ICD-10-CM

## 2022-04-28 DIAGNOSIS — C71.1 MALIGNANT ASTROCYTOMA OF FRONTAL LOBE: ICD-10-CM

## 2022-04-28 DIAGNOSIS — C71.1 MALIGNANT ASTROCYTOMA OF FRONTAL LOBE: Primary | ICD-10-CM

## 2022-04-28 DIAGNOSIS — D84.821 IMMUNODEFICIENCY DUE TO DRUG THERAPY: ICD-10-CM

## 2022-04-28 DIAGNOSIS — Z79.899 IMMUNODEFICIENCY DUE TO DRUG THERAPY: ICD-10-CM

## 2022-04-28 PROCEDURE — 99999 PR PBB SHADOW E&M-EST. PATIENT-LVL III: ICD-10-PCS | Mod: PBBFAC,,, | Performed by: STUDENT IN AN ORGANIZED HEALTH CARE EDUCATION/TRAINING PROGRAM

## 2022-04-28 PROCEDURE — 3079F DIAST BP 80-89 MM HG: CPT | Mod: CPTII,S$GLB,, | Performed by: STUDENT IN AN ORGANIZED HEALTH CARE EDUCATION/TRAINING PROGRAM

## 2022-04-28 PROCEDURE — 70553 MRI BRAIN STEM W/O & W/DYE: CPT | Mod: TC

## 2022-04-28 PROCEDURE — 1159F PR MEDICATION LIST DOCUMENTED IN MEDICAL RECORD: ICD-10-PCS | Mod: CPTII,S$GLB,, | Performed by: STUDENT IN AN ORGANIZED HEALTH CARE EDUCATION/TRAINING PROGRAM

## 2022-04-28 PROCEDURE — 77334 RADIATION TREATMENT AID(S): CPT | Mod: 26,,, | Performed by: RADIOLOGY

## 2022-04-28 PROCEDURE — 77014 PR  CT GUIDANCE PLACEMENT RAD THERAPY FIELDS: ICD-10-PCS | Mod: 26,,, | Performed by: RADIOLOGY

## 2022-04-28 PROCEDURE — 77263 PR  RADIATION THERAPY PLAN COMPLEX: ICD-10-PCS | Mod: ,,, | Performed by: RADIOLOGY

## 2022-04-28 PROCEDURE — A9585 GADOBUTROL INJECTION: HCPCS | Performed by: RADIOLOGY

## 2022-04-28 PROCEDURE — 99214 PR OFFICE/OUTPT VISIT, EST, LEVL IV, 30-39 MIN: ICD-10-PCS | Mod: S$GLB,,, | Performed by: STUDENT IN AN ORGANIZED HEALTH CARE EDUCATION/TRAINING PROGRAM

## 2022-04-28 PROCEDURE — 77334 RADIATION TREATMENT AID(S): CPT | Mod: TC | Performed by: RADIOLOGY

## 2022-04-28 PROCEDURE — 77014 HC CT GUIDANCE RADIATION THERAPY FLDS PLACEMENT: CPT | Mod: TC | Performed by: RADIOLOGY

## 2022-04-28 PROCEDURE — 70553 MRI BRAIN W WO CONTRAST: ICD-10-PCS | Mod: 26,,, | Performed by: RADIOLOGY

## 2022-04-28 PROCEDURE — 3077F SYST BP >= 140 MM HG: CPT | Mod: CPTII,S$GLB,, | Performed by: STUDENT IN AN ORGANIZED HEALTH CARE EDUCATION/TRAINING PROGRAM

## 2022-04-28 PROCEDURE — 70553 MRI BRAIN STEM W/O & W/DYE: CPT | Mod: 26,,, | Performed by: RADIOLOGY

## 2022-04-28 PROCEDURE — 1159F MED LIST DOCD IN RCRD: CPT | Mod: CPTII,S$GLB,, | Performed by: STUDENT IN AN ORGANIZED HEALTH CARE EDUCATION/TRAINING PROGRAM

## 2022-04-28 PROCEDURE — 3008F PR BODY MASS INDEX (BMI) DOCUMENTED: ICD-10-PCS | Mod: CPTII,S$GLB,, | Performed by: STUDENT IN AN ORGANIZED HEALTH CARE EDUCATION/TRAINING PROGRAM

## 2022-04-28 PROCEDURE — 25500020 PHARM REV CODE 255: Performed by: RADIOLOGY

## 2022-04-28 PROCEDURE — 77263 THER RADIOLOGY TX PLNG CPLX: CPT | Mod: ,,, | Performed by: RADIOLOGY

## 2022-04-28 PROCEDURE — 99214 OFFICE O/P EST MOD 30 MIN: CPT | Mod: S$GLB,,, | Performed by: STUDENT IN AN ORGANIZED HEALTH CARE EDUCATION/TRAINING PROGRAM

## 2022-04-28 PROCEDURE — 3008F BODY MASS INDEX DOCD: CPT | Mod: CPTII,S$GLB,, | Performed by: STUDENT IN AN ORGANIZED HEALTH CARE EDUCATION/TRAINING PROGRAM

## 2022-04-28 PROCEDURE — 3079F PR MOST RECENT DIASTOLIC BLOOD PRESSURE 80-89 MM HG: ICD-10-PCS | Mod: CPTII,S$GLB,, | Performed by: STUDENT IN AN ORGANIZED HEALTH CARE EDUCATION/TRAINING PROGRAM

## 2022-04-28 PROCEDURE — 99999 PR PBB SHADOW E&M-EST. PATIENT-LVL III: CPT | Mod: PBBFAC,,, | Performed by: STUDENT IN AN ORGANIZED HEALTH CARE EDUCATION/TRAINING PROGRAM

## 2022-04-28 PROCEDURE — 77014 PR  CT GUIDANCE PLACEMENT RAD THERAPY FIELDS: CPT | Mod: 26,,, | Performed by: RADIOLOGY

## 2022-04-28 PROCEDURE — 77334 PR  RADN TREATMENT AID(S) COMPLX: ICD-10-PCS | Mod: 26,,, | Performed by: RADIOLOGY

## 2022-04-28 PROCEDURE — 3077F PR MOST RECENT SYSTOLIC BLOOD PRESSURE >= 140 MM HG: ICD-10-PCS | Mod: CPTII,S$GLB,, | Performed by: STUDENT IN AN ORGANIZED HEALTH CARE EDUCATION/TRAINING PROGRAM

## 2022-04-28 RX ORDER — GADOBUTROL 604.72 MG/ML
8 INJECTION INTRAVENOUS
Status: COMPLETED | OUTPATIENT
Start: 2022-04-28 | End: 2022-04-28

## 2022-04-28 RX ADMIN — GADOBUTROL 8 ML: 604.72 INJECTION INTRAVENOUS at 08:04

## 2022-04-28 NOTE — ASSESSMENT & PLAN NOTE
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  He is doing well without any new symptoms.  -Plan for chemoradiation with temozolomide followed by maintenance temozolomide for up to 12 cycles.   -he says he will start radiation 5/9/22  -Bactrim ppx M/W/F while receiving TMZ+RT.  -Weekly CBC and CMP while receiving TMZ+RT.  -Follow up with me during week 2 of chemoRT  -continue decadron taper per neurosurgery/radiation oncology.  -Zofran and compazine prn nausea.

## 2022-04-29 NOTE — PROGRESS NOTES
PATIENT: Дмитрий Winn  MRN: [id number]  DATE: 4/29/2022      Diagnosis:   1. Malignant astrocytoma of frontal lobe    2. Immunodeficiency due to drug therapy    3. Localization-related (focal) (partial) symptomatic epilepsy and epileptic syndromes with complex partial seizures, not intractable, without status epilepticus        Chief Complaint: Astrocytoma brain tumor      Oncologic History:    Oncologic History 1. Astrocytoma, IDH mutated    Oncologic Treatment 1. 8/2018 STR  2. 2/17/22 STR    Pathology 1. 8/10/2018. Pathology: Diffuse Astrocytoma IDH1 and ATRX mutated. P53 over expressed, WHO Grade II  Genomic alterations include loss of 2q37.1q37.3, gain of 7q22.1q36.3, gain of  11q23.3q24.1, copy neutral loss of heterozygosity (cnLOH) of 17p13.3p11.1 (including TP53), gain of 18p11.32p11.21, gain of 18q21.2q23, and gain of 19p13.3p11 (including PTBP1).          Subjective:    Interval History: Mr. Winn is here for follow up.  He is a former patient of Dr. Coronado.    36 year old man with history of epilepsy and initial diagnosis of astrocytoma, WHO grade II s/p STR 8/2018.  When he was first diagnosed, he presented with a seizure to [facility] 8/[facility].  He was followed with surveillance with recent MRI 1/29/22 showing interval growth of the right frontal lesion with mild increased mass effect; patient at his follow up visit with Dr. Van was asymptomatic at the time.  Decision was made to perform craniotomy.  2/17/22 he had a STR and final pathology showed WHO grade II astrocytoma, IDH mutated.     No new focal neuro symptoms since his last visit. No recurrent seizures. He has tapered off of dexamethasone.    He currently works as a del real. He lives on the [place].      He is alone at this visit today.    Past Medical History:   Past Medical History:   Diagnosis Date    Ataxia 5/29/2020    Cancer     brain tumor    Epilepsy     Normocytic anemia 3/8/2022       Past Surgical  HIstory:   Past Surgical History:   Procedure Laterality Date    CRANIOTOMY FOR EXCISION OF INTRACRANIAL TUMOR Right 8/10/2018    Procedure: CRANIOTOMY, FOR INTRACRANIAL NEOPLASM EXCISION Right eyebrow incision / [facility] craniotomy for tumor resection;  Surgeon: Lefty Paulson MD;  [facility];  Service: Neurosurgery;  Laterality: Right;    CRANIOTOMY USING FRAMELESS STEREOTAXY Right 2/17/2022    Procedure: CRANIOTOMY, USING FRAMELESS STEREOTAXY;  Surgeon: Chago Van MD;  [facility];  Service: Neurosurgery;  Laterality: Right;    cyst removed      SURGICAL REMOVAL OF PILONIDAL CYST         Family History:   Family History   Problem Relation Age of Onset    Cancer Maternal Grandfather     Prostate cancer Maternal Grandfather     Cancer Paternal Grandmother     Breast cancer Paternal Grandmother     Hypertension Mother     No Known Problems Sister     No Known Problems Brother        Social History:  reports that he quit smoking about 3 years ago. His smoking use included cigarettes. He started smoking about 12 years ago. He has a 8.00 pack-year smoking history. He has never used smokeless tobacco. He reports current alcohol use of about 42.0 standard drinks of alcohol per week. He reports that he does not use drugs.    Allergies:  Review of patient's allergies indicates:  No Known Allergies    Medications:  Current Outpatient Medications   Medication Sig Dispense Refill    famotidine (PEPCID) 20 MG tablet Take 1 tablet (20 mg total) by mouth 2 (two) times daily. 60 tablet 0    indomethacin (INDOCIN) 50 MG capsule Take 1 capsule (50 mg total) by mouth 2 (two) times daily with meals. 90 capsule 0    levETIRAcetam (KEPPRA) 750 MG Tab Take 1 tablet (750 mg total) by mouth 2 (two) times daily. 60 tablet 1    ondansetron (ZOFRAN) 8 MG tablet Take 1 tablet (8 mg total) by mouth every 8 (eight) hours as needed for Nausea. 30 tablet 3    prochlorperazine (COMPAZINE) 10 MG  "tablet Take 1 tablet (10 mg total) by mouth 3 (three) times daily as needed (use when zofran does not work). 30 tablet 3    sulfamethoxazole-trimethoprim 800-160mg (BACTRIM DS) 800-160 mg Tab Take 1 tablet by mouth on Mondays, Wednesdays, and Fridays. 18 tablet 0    temozolomide (TEMODAR) 140 MG capsule Take 1 capsule (140 mg total) by mouth once daily Take as directed days 1-42 (6 weeks). Take on an empty stomach.. 42 capsule 0     No current facility-administered medications for this visit.       Review of Systems   Constitutional: Negative for activity change, appetite change, chills, diaphoresis, fatigue, fever and unexpected weight change.   HENT: Negative for nosebleeds and trouble swallowing.    Eyes: Negative for visual disturbance.   Respiratory: Negative for cough, chest tightness, shortness of breath and wheezing.    Cardiovascular: Negative for chest pain and leg swelling.   Gastrointestinal: Negative for abdominal distention, abdominal pain, blood in stool, constipation, diarrhea, nausea and vomiting.   Endocrine: Negative for cold intolerance and heat intolerance.   Genitourinary: Negative for difficulty urinating and dysuria.   Musculoskeletal: Negative for arthralgias and back pain.   Skin: Negative for color change.   Neurological: Negative for dizziness, weakness, light-headedness, numbness and headaches.   Hematological: Negative for adenopathy. Does not bruise/bleed easily.   Psychiatric/Behavioral: Negative for confusion and sleep disturbance.       ECOG Performance Status:     ECOG SCORE    0 - Fully active-able to carry on all pre-disease performance without restriction         Objective:      Vitals:   Vitals:    04/28/22 1308   BP: (!) 155/86   Pulse: 69   Weight: 73.8 kg (162 lb 11.2 oz)   Height: 5' 6" (1.676 m)     BMI: Body mass index is 26.26 kg/m².       Physical Exam  Constitutional:       General: He is not in acute distress.     Appearance: Normal appearance. He is not " ill-appearing.   HENT:      Head: Normocephalic.      Mouth/Throat:      Pharynx: No oropharyngeal exudate or posterior oropharyngeal erythema.   Eyes:      General: No scleral icterus.     Extraocular Movements: Extraocular movements intact.      Conjunctiva/sclera: Conjunctivae normal.      Pupils: Pupils are equal, round, and reactive to light.   Cardiovascular:      Rate and Rhythm: Normal rate and regular rhythm.      Heart sounds: No murmur heard.    No friction rub. No gallop.   Pulmonary:      Effort: Pulmonary effort is normal. No respiratory distress.      Breath sounds: No stridor. No wheezing, rhonchi or rales.   Abdominal:      General: Bowel sounds are normal. There is no distension.      Palpations: Abdomen is soft. There is no mass.      Tenderness: There is no abdominal tenderness. There is no guarding or rebound.   Musculoskeletal:         General: Normal range of motion.      Cervical back: Normal range of motion and neck supple.      Right lower leg: No edema.      Left lower leg: No edema.   Skin:     General: Skin is warm and dry.   Neurological:      General: No focal deficit present.      Mental Status: He is alert.         Laboratory Data:   Recent Results (from the past 168 hour(s))   Comprehensive Metabolic Panel    Collection Time: 04/28/22 11:40 AM   Result Value Ref Range    Sodium 138 136 - 145 mmol/L    Potassium 4.1 3.5 - 5.1 mmol/L    Chloride 100 95 - 110 mmol/L    CO2 28 23 - 29 mmol/L    Glucose 104 70 - 110 mg/dL    BUN 7 6 - 20 mg/dL    Creatinine 0.9 0.5 - 1.4 mg/dL    Calcium 9.6 8.7 - 10.5 mg/dL    Total Protein 6.8 6.0 - 8.4 g/dL    Albumin 4.2 3.5 - 5.2 g/dL    Total Bilirubin 0.5 0.1 - 1.0 mg/dL    Alkaline Phosphatase 79 55 - 135 U/L    AST 20 10 - 40 U/L    ALT 22 10 - 44 U/L    Anion Gap 10 8 - 16 mmol/L    eGFR if African American >60.0 >60 mL/min/1.73 m^2    eGFR if non African American >60.0 >60 mL/min/1.73 m^2   CBC Auto Differential    Collection Time: 04/28/22  11:40 AM   Result Value Ref Range    WBC 7.89 3.90 - 12.70 K/uL    RBC 4.80 4.60 - 6.20 M/uL    Hemoglobin 14.1 14.0 - 18.0 g/dL    Hematocrit 41.1 40.0 - 54.0 %    MCV 86 82 - 98 fL    MCH 29.4 27.0 - 31.0 pg    MCHC 34.3 32.0 - 36.0 g/dL    RDW 13.1 11.5 - 14.5 %    Platelets 285 150 - 450 K/uL    MPV 12.1 9.2 - 12.9 fL    Immature Granulocytes 0.5 0.0 - 0.5 %    Gran # (ANC) 5.7 1.8 - 7.7 K/uL    Immature Grans (Abs) 0.04 0.00 - 0.04 K/uL    Lymph # 1.4 1.0 - 4.8 K/uL    Mono # 0.7 0.3 - 1.0 K/uL    Eos # 0.0 0.0 - 0.5 K/uL    Baso # 0.05 0.00 - 0.20 K/uL    nRBC 0 0 /100 WBC    Gran % 72.2 38.0 - 73.0 %    Lymph % 17.4 (L) 18.0 - 48.0 %    Mono % 8.9 4.0 - 15.0 %    Eosinophil % 0.4 0.0 - 8.0 %    Basophil % 0.6 0.0 - 1.9 %    Differential Method Automated          Imaging:     MRI Brain W WO Contrast    Result Date: 4/28/2022  EXAMINATION: MRI BRAIN W WO CONTRAST CLINICAL HISTORY: Low grade glioma; Malignant neoplasm of frontal lobe TECHNIQUE: Multiplanar multisequence MR imaging of the brain was performed before and after the administration of 8 mL Gadavist intravenous contrast. COMPARISON: MRI brain 02/17/2022 [id number], 04/30/2021, 04/20/2021. CT head 05/29/2020. FINDINGS: Postoperative changes of right frontal craniotomy for right frontal lesion resection.  Interval resolution of postoperative gas and layering hemorrhage within the resection cavity.  There is a thin area of enhancement surrounding the resection margins without nodular contour with some component of diffusion restriction, overall similar to prior, likely post-treatment change.  Previous T2 FLAIR hyperintensity along the posterolateral aspect of the resection cavity is reduced there is marginated T2 flair signal hyperintensity along the ventral aspect of the resection cavity as well as smaller component along the dorsal aspect the resection cavity concerning for components of residual nonenhancing lesion previously felt to represent  postoperative fluid with reduced mass effect from prior.  This is most pronounced ventrally with component measuring approximately a 0.7 x 3.2 cm in AP by TV dimensions. There is slight marginated diffusion abnormality about the resection cavity felt to represent artifact from postoperative hemorrhage.  There is no restricted diffusion elsewhere to suggest acute or recent infarction.  Component of T2 flair signal hyperintensity along the margin resection cavity felt to represent gliosis and scarring.  There is reduced mass effect related to resection cavity with resolution of previous midline shift and re-expansion right frontal horn lateral ventricle.  No evidence for current hydrocephalus. Again noted incidental probable punctate developmental venous anomaly within the right inferior frontal lobe, similar to prior. The T2 skull base flow voids are preserved.  Mild opacification posterior left ethmoidal air cells. This report was flagged in Epic as abnormal.     Evolving operative change from right frontal lesion resection via right frontal craniotomy. Slightly more apparent marginated T2 flair hyperintensity along the ventral aspect the resection cavity felt to represent lesion residual and to lesser degree dorsal aspect resection cavity.  There is no significant new parenchymal signal abnormality to suggest lesional worsening with study to represent new baseline with resolution of previous postoperative gas and hemorrhage within the resection cavity as well as reduced edema. Thin marginated enhancement about the resection cavity felt to be postoperative no evidence for nodular enhancement. Clinical correlation and continued follow-up advised. Electronically signed by resident: Chris Carvajal Date:    04/28/2022 Time:    08:27 Electronically signed by: Duc Lynch DO Date:    04/28/2022 Time:    09:41      Assessment:       1. Malignant astrocytoma of frontal lobe    2. Immunodeficiency due to drug therapy    3.  Localization-related (focal) (partial) symptomatic epilepsy and epileptic syndromes with complex partial seizures, not intractable, without status epilepticus           Plan:       Problem List Items Addressed This Visit        Neuro    Localization-related (focal) (partial) symptomatic epilepsy and epileptic syndromes with complex partial seizures, not intractable, without status epilepticus    Current Assessment & Plan     No recurrent seizures since his last visit.  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  He is doing well without any new symptoms.  -Plan for chemoradiation with temozolomide followed by maintenance temozolomide for up to 12 cycles.   -he says he will start radiation 5/9/22  -Bactrim ppx M/W/F while receiving TMZ+RT.  -Weekly CBC and CMP while receiving TMZ+RT.  -Follow up with me during week 2 of chemoRT  -continue decadron taper per neurosurgery/radiation oncology.  -Zofran and compazine prn nausea.                   No orders of the defined types were placed in this encounter.    Route Chart for Scheduling    Med Onc Chart Routing      Follow up with physician Other. Follow up with me 5/16   Follow up with [facility]    Labs CMP and CBC   Lab interval:  Weekly x 5 weeks starting 5/16   Imaging None      Pharmacy appointment No pharmacy appointment needed      Other referrals No additional referrals needed         Treatment Plan Information   OP TEMOZOLOMIDE DAILY (6 WEEKS) WITH CONCURRENT RADIATION   Sha Pan MD   Upcoming Treatment Dates - OP TEMOZOLOMIDE DAILY (6 WEEKS) WITH CONCURRENT RADIATION    No upcoming days in selected categories.          Sha Pan  MD  Hematology Oncology

## 2022-05-02 ENCOUNTER — TELEPHONE (OUTPATIENT)
Dept: HEMATOLOGY/ONCOLOGY | Facility: CLINIC | Age: 37
End: 2022-05-02
Payer: COMMERCIAL

## 2022-05-02 ENCOUNTER — HOSPITAL ENCOUNTER (OUTPATIENT)
Dept: RADIATION THERAPY | Facility: HOSPITAL | Age: 37
Discharge: HOME | End: 2022-05-02
Attending: RADIOLOGY
Payer: COMMERCIAL

## 2022-05-05 PROCEDURE — 77301 PR  INTEN MOD RADIOTHER PLAN W/DOSE VOL HIST: ICD-10-PCS | Mod: 26,,, | Performed by: RADIOLOGY

## 2022-05-05 PROCEDURE — 77301 RADIOTHERAPY DOSE PLAN IMRT: CPT | Mod: 26,,, | Performed by: RADIOLOGY

## 2022-05-05 PROCEDURE — 77301 RADIOTHERAPY DOSE PLAN IMRT: CPT | Mod: TC | Performed by: RADIOLOGY

## 2022-05-06 PROCEDURE — 77300 RADIATION THERAPY DOSE PLAN: CPT | Mod: 26,,, | Performed by: RADIOLOGY

## 2022-05-06 PROCEDURE — 77338 DESIGN MLC DEVICE FOR IMRT: CPT | Mod: 26,,, | Performed by: RADIOLOGY

## 2022-05-06 PROCEDURE — 77338 DESIGN MLC DEVICE FOR IMRT: CPT | Mod: TC | Performed by: RADIOLOGY

## 2022-05-06 PROCEDURE — 77300 PR RADIATION THERAPY,DOSIMETRY PLAN: ICD-10-PCS | Mod: 26,,, | Performed by: RADIOLOGY

## 2022-05-06 PROCEDURE — 77300 RADIATION THERAPY DOSE PLAN: CPT | Mod: TC | Performed by: RADIOLOGY

## 2022-05-06 PROCEDURE — 77338 PR  MLC IMRT DESIGN & CONSTRUCTION PER IMRT PLAN: ICD-10-PCS | Mod: 26,,, | Performed by: RADIOLOGY

## 2022-05-09 ENCOUNTER — DOCUMENTATION ONLY (OUTPATIENT)
Dept: RADIATION ONCOLOGY | Facility: CLINIC | Age: 37
End: 2022-05-09
Payer: COMMERCIAL

## 2022-05-09 PROCEDURE — 77470 SPECIAL RADIATION TREATMENT: CPT | Mod: 26,59,, | Performed by: RADIOLOGY

## 2022-05-09 PROCEDURE — 77014 PR  CT GUIDANCE PLACEMENT RAD THERAPY FIELDS: CPT | Mod: 26,,, | Performed by: RADIOLOGY

## 2022-05-09 PROCEDURE — 77014 PR  CT GUIDANCE PLACEMENT RAD THERAPY FIELDS: ICD-10-PCS | Mod: 26,,, | Performed by: RADIOLOGY

## 2022-05-09 PROCEDURE — 77470 PR  SPECIAL RADIATION TREATMENT: ICD-10-PCS | Mod: 26,59,, | Performed by: RADIOLOGY

## 2022-05-09 PROCEDURE — 77470 SPECIAL RADIATION TREATMENT: CPT | Mod: 59,TC | Performed by: RADIOLOGY

## 2022-05-09 PROCEDURE — 77014 HC CT GUIDANCE RADIATION THERAPY FLDS PLACEMENT: CPT | Mod: TC | Performed by: RADIOLOGY

## 2022-05-09 PROCEDURE — 77386 HC IMRT, COMPLEX: CPT | Performed by: RADIOLOGY

## 2022-05-10 PROCEDURE — 77014 HC CT GUIDANCE RADIATION THERAPY FLDS PLACEMENT: CPT | Mod: TC | Performed by: RADIOLOGY

## 2022-05-10 PROCEDURE — 77014 PR  CT GUIDANCE PLACEMENT RAD THERAPY FIELDS: CPT | Mod: 26,,, | Performed by: RADIOLOGY

## 2022-05-10 PROCEDURE — 77014 PR  CT GUIDANCE PLACEMENT RAD THERAPY FIELDS: ICD-10-PCS | Mod: 26,,, | Performed by: RADIOLOGY

## 2022-05-10 PROCEDURE — 77386 HC IMRT, COMPLEX: CPT | Performed by: RADIOLOGY

## 2022-05-11 PROCEDURE — 77386 HC IMRT, COMPLEX: CPT | Performed by: RADIOLOGY

## 2022-05-11 PROCEDURE — 77014 HC CT GUIDANCE RADIATION THERAPY FLDS PLACEMENT: CPT | Mod: TC | Performed by: RADIOLOGY

## 2022-05-11 PROCEDURE — 77014 PR  CT GUIDANCE PLACEMENT RAD THERAPY FIELDS: ICD-10-PCS | Mod: 26,,, | Performed by: RADIOLOGY

## 2022-05-11 PROCEDURE — 77014 PR  CT GUIDANCE PLACEMENT RAD THERAPY FIELDS: CPT | Mod: 26,,, | Performed by: RADIOLOGY

## 2022-05-12 PROCEDURE — 77014 HC CT GUIDANCE RADIATION THERAPY FLDS PLACEMENT: CPT | Mod: TC | Performed by: RADIOLOGY

## 2022-05-12 PROCEDURE — 77014 PR  CT GUIDANCE PLACEMENT RAD THERAPY FIELDS: CPT | Mod: 26,,, | Performed by: RADIOLOGY

## 2022-05-12 PROCEDURE — 77386 HC IMRT, COMPLEX: CPT | Performed by: RADIOLOGY

## 2022-05-12 PROCEDURE — 77014 PR  CT GUIDANCE PLACEMENT RAD THERAPY FIELDS: ICD-10-PCS | Mod: 26,,, | Performed by: RADIOLOGY

## 2022-05-13 PROCEDURE — 77014 PR  CT GUIDANCE PLACEMENT RAD THERAPY FIELDS: CPT | Mod: 26,,, | Performed by: RADIOLOGY

## 2022-05-13 PROCEDURE — 77336 RADIATION PHYSICS CONSULT: CPT | Performed by: RADIOLOGY

## 2022-05-13 PROCEDURE — 77014 PR  CT GUIDANCE PLACEMENT RAD THERAPY FIELDS: ICD-10-PCS | Mod: 26,,, | Performed by: RADIOLOGY

## 2022-05-13 PROCEDURE — 77014 HC CT GUIDANCE RADIATION THERAPY FLDS PLACEMENT: CPT | Mod: TC | Performed by: RADIOLOGY

## 2022-05-13 PROCEDURE — 77386 HC IMRT, COMPLEX: CPT | Performed by: RADIOLOGY

## 2022-05-16 ENCOUNTER — LAB VISIT (OUTPATIENT)
Dept: LAB | Facility: HOSPITAL | Age: 37
End: 2022-05-16
Attending: STUDENT IN AN ORGANIZED HEALTH CARE EDUCATION/TRAINING PROGRAM
Payer: COMMERCIAL

## 2022-05-16 ENCOUNTER — OFFICE VISIT (OUTPATIENT)
Dept: HEMATOLOGY/ONCOLOGY | Facility: CLINIC | Age: 37
End: 2022-05-16
Payer: COMMERCIAL

## 2022-05-16 VITALS
RESPIRATION RATE: 16 BRPM | BODY MASS INDEX: 25.83 KG/M2 | HEIGHT: 66 IN | WEIGHT: 160.69 LBS | HEART RATE: 74 BPM | SYSTOLIC BLOOD PRESSURE: 134 MMHG | OXYGEN SATURATION: 98 % | TEMPERATURE: 98 F | DIASTOLIC BLOOD PRESSURE: 89 MMHG

## 2022-05-16 DIAGNOSIS — G40.209 LOCALIZATION-RELATED (FOCAL) (PARTIAL) SYMPTOMATIC EPILEPSY AND EPILEPTIC SYNDROMES WITH COMPLEX PARTIAL SEIZURES, NOT INTRACTABLE, WITHOUT STATUS EPILEPTICUS: ICD-10-CM

## 2022-05-16 DIAGNOSIS — D84.821 IMMUNODEFICIENCY DUE TO DRUG THERAPY: ICD-10-CM

## 2022-05-16 DIAGNOSIS — C71.9 ASTROCYTOMA BRAIN TUMOR: ICD-10-CM

## 2022-05-16 DIAGNOSIS — C71.1 MALIGNANT ASTROCYTOMA OF FRONTAL LOBE: Primary | ICD-10-CM

## 2022-05-16 DIAGNOSIS — Z78.9 ALCOHOL CONSUMPTION OF MORE THAN FOUR DRINKS PER DAY: ICD-10-CM

## 2022-05-16 DIAGNOSIS — Z79.899 IMMUNODEFICIENCY DUE TO DRUG THERAPY: ICD-10-CM

## 2022-05-16 DIAGNOSIS — D64.9 NORMOCYTIC ANEMIA: ICD-10-CM

## 2022-05-16 LAB
ALBUMIN SERPL BCP-MCNC: 4.3 G/DL (ref 3.5–5.2)
ALP SERPL-CCNC: 71 U/L (ref 55–135)
ALT SERPL W/O P-5'-P-CCNC: 32 U/L (ref 10–44)
ANION GAP SERPL CALC-SCNC: 11 MMOL/L (ref 8–16)
AST SERPL-CCNC: 26 U/L (ref 10–40)
BASOPHILS # BLD AUTO: 0.06 K/UL (ref 0–0.2)
BASOPHILS NFR BLD: 0.8 % (ref 0–1.9)
BILIRUB SERPL-MCNC: 0.4 MG/DL (ref 0.1–1)
BUN SERPL-MCNC: 7 MG/DL (ref 6–20)
CALCIUM SERPL-MCNC: 9.3 MG/DL (ref 8.7–10.5)
CHLORIDE SERPL-SCNC: 102 MMOL/L (ref 95–110)
CO2 SERPL-SCNC: 23 MMOL/L (ref 23–29)
CREAT SERPL-MCNC: 0.9 MG/DL (ref 0.5–1.4)
DIFFERENTIAL METHOD: ABNORMAL
EOSINOPHIL # BLD AUTO: 0.1 K/UL (ref 0–0.5)
EOSINOPHIL NFR BLD: 1.6 % (ref 0–8)
ERYTHROCYTE [DISTWIDTH] IN BLOOD BY AUTOMATED COUNT: 12.2 % (ref 11.5–14.5)
EST. GFR  (AFRICAN AMERICAN): >60 ML/MIN/1.73 M^2
EST. GFR  (NON AFRICAN AMERICAN): >60 ML/MIN/1.73 M^2
GLUCOSE SERPL-MCNC: 97 MG/DL (ref 70–110)
HCT VFR BLD AUTO: 41.2 % (ref 40–54)
HGB BLD-MCNC: 13.9 G/DL (ref 14–18)
IMM GRANULOCYTES # BLD AUTO: 0.03 K/UL (ref 0–0.04)
IMM GRANULOCYTES NFR BLD AUTO: 0.4 % (ref 0–0.5)
LYMPHOCYTES # BLD AUTO: 1.6 K/UL (ref 1–4.8)
LYMPHOCYTES NFR BLD: 21.5 % (ref 18–48)
MCH RBC QN AUTO: 29.2 PG (ref 27–31)
MCHC RBC AUTO-ENTMCNC: 33.7 G/DL (ref 32–36)
MCV RBC AUTO: 87 FL (ref 82–98)
MONOCYTES # BLD AUTO: 0.8 K/UL (ref 0.3–1)
MONOCYTES NFR BLD: 11 % (ref 4–15)
NEUTROPHILS # BLD AUTO: 4.9 K/UL (ref 1.8–7.7)
NEUTROPHILS NFR BLD: 64.7 % (ref 38–73)
NRBC BLD-RTO: 0 /100 WBC
PLATELET # BLD AUTO: 282 K/UL (ref 150–450)
PMV BLD AUTO: 12 FL (ref 9.2–12.9)
POTASSIUM SERPL-SCNC: 3.9 MMOL/L (ref 3.5–5.1)
PROT SERPL-MCNC: 6.9 G/DL (ref 6–8.4)
RBC # BLD AUTO: 4.76 M/UL (ref 4.6–6.2)
SODIUM SERPL-SCNC: 136 MMOL/L (ref 136–145)
WBC # BLD AUTO: 7.55 K/UL (ref 3.9–12.7)

## 2022-05-16 PROCEDURE — 1160F PR REVIEW ALL MEDS BY PRESCRIBER/CLIN PHARMACIST DOCUMENTED: ICD-10-PCS | Mod: CPTII,S$GLB,, | Performed by: STUDENT IN AN ORGANIZED HEALTH CARE EDUCATION/TRAINING PROGRAM

## 2022-05-16 PROCEDURE — 99215 OFFICE O/P EST HI 40 MIN: CPT | Mod: S$GLB,,, | Performed by: STUDENT IN AN ORGANIZED HEALTH CARE EDUCATION/TRAINING PROGRAM

## 2022-05-16 PROCEDURE — 77014 HC CT GUIDANCE RADIATION THERAPY FLDS PLACEMENT: CPT | Mod: TC | Performed by: RADIOLOGY

## 2022-05-16 PROCEDURE — 3075F SYST BP GE 130 - 139MM HG: CPT | Mod: CPTII,S$GLB,, | Performed by: STUDENT IN AN ORGANIZED HEALTH CARE EDUCATION/TRAINING PROGRAM

## 2022-05-16 PROCEDURE — 99999 PR PBB SHADOW E&M-EST. PATIENT-LVL III: ICD-10-PCS | Mod: PBBFAC,,, | Performed by: STUDENT IN AN ORGANIZED HEALTH CARE EDUCATION/TRAINING PROGRAM

## 2022-05-16 PROCEDURE — 77014 PR  CT GUIDANCE PLACEMENT RAD THERAPY FIELDS: ICD-10-PCS | Mod: 26,,, | Performed by: RADIOLOGY

## 2022-05-16 PROCEDURE — 3008F BODY MASS INDEX DOCD: CPT | Mod: CPTII,S$GLB,, | Performed by: STUDENT IN AN ORGANIZED HEALTH CARE EDUCATION/TRAINING PROGRAM

## 2022-05-16 PROCEDURE — 36415 COLL VENOUS BLD VENIPUNCTURE: CPT | Performed by: STUDENT IN AN ORGANIZED HEALTH CARE EDUCATION/TRAINING PROGRAM

## 2022-05-16 PROCEDURE — 77386 HC IMRT, COMPLEX: CPT | Performed by: RADIOLOGY

## 2022-05-16 PROCEDURE — 1159F MED LIST DOCD IN RCRD: CPT | Mod: CPTII,S$GLB,, | Performed by: STUDENT IN AN ORGANIZED HEALTH CARE EDUCATION/TRAINING PROGRAM

## 2022-05-16 PROCEDURE — 3008F PR BODY MASS INDEX (BMI) DOCUMENTED: ICD-10-PCS | Mod: CPTII,S$GLB,, | Performed by: STUDENT IN AN ORGANIZED HEALTH CARE EDUCATION/TRAINING PROGRAM

## 2022-05-16 PROCEDURE — 99999 PR PBB SHADOW E&M-EST. PATIENT-LVL III: CPT | Mod: PBBFAC,,, | Performed by: STUDENT IN AN ORGANIZED HEALTH CARE EDUCATION/TRAINING PROGRAM

## 2022-05-16 PROCEDURE — 1159F PR MEDICATION LIST DOCUMENTED IN MEDICAL RECORD: ICD-10-PCS | Mod: CPTII,S$GLB,, | Performed by: STUDENT IN AN ORGANIZED HEALTH CARE EDUCATION/TRAINING PROGRAM

## 2022-05-16 PROCEDURE — 99215 PR OFFICE/OUTPT VISIT, EST, LEVL V, 40-54 MIN: ICD-10-PCS | Mod: S$GLB,,, | Performed by: STUDENT IN AN ORGANIZED HEALTH CARE EDUCATION/TRAINING PROGRAM

## 2022-05-16 PROCEDURE — 80053 COMPREHEN METABOLIC PANEL: CPT | Performed by: STUDENT IN AN ORGANIZED HEALTH CARE EDUCATION/TRAINING PROGRAM

## 2022-05-16 PROCEDURE — 3079F PR MOST RECENT DIASTOLIC BLOOD PRESSURE 80-89 MM HG: ICD-10-PCS | Mod: CPTII,S$GLB,, | Performed by: STUDENT IN AN ORGANIZED HEALTH CARE EDUCATION/TRAINING PROGRAM

## 2022-05-16 PROCEDURE — 3075F PR MOST RECENT SYSTOLIC BLOOD PRESS GE 130-139MM HG: ICD-10-PCS | Mod: CPTII,S$GLB,, | Performed by: STUDENT IN AN ORGANIZED HEALTH CARE EDUCATION/TRAINING PROGRAM

## 2022-05-16 PROCEDURE — 85025 COMPLETE CBC W/AUTO DIFF WBC: CPT | Performed by: STUDENT IN AN ORGANIZED HEALTH CARE EDUCATION/TRAINING PROGRAM

## 2022-05-16 PROCEDURE — 77014 PR  CT GUIDANCE PLACEMENT RAD THERAPY FIELDS: CPT | Mod: 26,,, | Performed by: RADIOLOGY

## 2022-05-16 PROCEDURE — 3079F DIAST BP 80-89 MM HG: CPT | Mod: CPTII,S$GLB,, | Performed by: STUDENT IN AN ORGANIZED HEALTH CARE EDUCATION/TRAINING PROGRAM

## 2022-05-16 PROCEDURE — 1160F RVW MEDS BY RX/DR IN RCRD: CPT | Mod: CPTII,S$GLB,, | Performed by: STUDENT IN AN ORGANIZED HEALTH CARE EDUCATION/TRAINING PROGRAM

## 2022-05-16 NOTE — ASSESSMENT & PLAN NOTE
Doing well without any adverse side effects of chemoradiation.  -weekly labs  -follow up in two weeks  -supportive meds: continue zofran 1 hour prior to TMZ

## 2022-05-16 NOTE — ASSESSMENT & PLAN NOTE
Still using etoh although he has cut down. No withdrawal symptoms  -counseled him on 2 drinks / day or less as his goal.

## 2022-05-16 NOTE — PROGRESS NOTES
PATIENT: Дмитрий Winn  MRN: [id number]  DATE: 5/16/2022      Diagnosis:   1. Malignant astrocytoma of frontal lobe    2. Immunodeficiency due to drug therapy    3. Localization-related (focal) (partial) symptomatic epilepsy and epileptic syndromes with complex partial seizures, not intractable, without status epilepticus    4. Normocytic anemia    5. Alcohol consumption of more than four drinks per day        Chief Complaint: Malignant astrocytoma of frontal lobe      Oncologic History:    Oncologic History 1. Astrocytoma, IDH mutated    Oncologic Treatment 1. 8/2018 STR  2. 2/17/22 STR  3. 5/9/22 chemoradiation with temozolomide    Pathology 1. 8/10/2018. Pathology: Diffuse Astrocytoma IDH1 and ATRX mutated. P53 over expressed, WHO Grade II  Genomic alterations include loss of 2q37.1q37.3, gain of 7q22.1q36.3, gain of  11q23.3q24.1, copy neutral loss of heterozygosity (cnLOH) of 17p13.3p11.1 (including TP53), gain of 18p11.32p11.21, gain of 18q21.2q23, and gain of 19p13.3p11 (including PTBP1).          Subjective:    Interval History: Mr. Winn is here for follow up.  He is a former patient of Dr. Coronado.    36 year old man with history of epilepsy and initial diagnosis of astrocytoma, WHO grade II s/p STR 8/2018.  When he was first diagnosed, he presented with a seizure to [facility] 8/2018.  He was followed with surveillance with recent MRI 1/29/22 showing interval growth of the right frontal lesion with mild increased mass effect; patient at his follow up visit with Dr. Van was asymptomatic at the time.  Decision was made to perform craniotomy.  2/17/22 he had a STR and final pathology showed WHO grade II astrocytoma, IDH mutated.     He is doing well since starting chemoradiation.  No adverse side effects from temozolomide.    No new focal neuro symptoms since his last visit. No recurrent seizures. He is not on dexamethasone.    He currently works as a del real. He lives on the  [place].      He is alone at this visit today.    Past Medical History:   Past Medical History:   Diagnosis Date    Ataxia 5/29/2020    Cancer     brain tumor    Epilepsy     Normocytic anemia 3/8/2022       Past Surgical HIstory:   Past Surgical History:   Procedure Laterality Date    CRANIOTOMY FOR EXCISION OF INTRACRANIAL TUMOR Right 8/10/2018    Procedure: CRANIOTOMY, FOR INTRACRANIAL NEOPLASM EXCISION Right eyebrow incision / LAYNE BrainPath craniotomy for tumor resection;  Surgeon: Lefty Paulson MD;  Location: [facility] OR;  Service: Neurosurgery;  Laterality: Right;    CRANIOTOMY USING FRAMELESS STEREOTAXY Right 2/17/2022    Procedure: CRANIOTOMY, USING FRAMELESS STEREOTAXY;  Surgeon: Chago Van MD;  [facility];  Service: Neurosurgery;  Laterality: Right;    cyst removed      SURGICAL REMOVAL OF PILONIDAL CYST         Family History:   Family History   Problem Relation Age of Onset    Cancer Maternal Grandfather     Prostate cancer Maternal Grandfather     Cancer Paternal Grandmother     Breast cancer Paternal Grandmother     Hypertension Mother     No Known Problems Sister     No Known Problems Brother        Social History:  reports that he quit smoking about 3 years ago. His smoking use included cigarettes. He started smoking about 12 years ago. He has a 8.00 pack-year smoking history. He has never used smokeless tobacco. He reports current alcohol use of about 42.0 standard drinks of alcohol per week. He reports that he does not use drugs.    Allergies:  Review of patient's allergies indicates:  No Known Allergies    Medications:  Current Outpatient Medications   Medication Sig Dispense Refill    famotidine (PEPCID) 20 MG tablet Take 1 tablet (20 mg total) by mouth 2 (two) times daily. 60 tablet 0    indomethacin (INDOCIN) 50 MG capsule Take 1 capsule (50 mg total) by mouth 2 (two) times daily with meals. 90 capsule 0    levETIRAcetam (KEPPRA) 750 MG Tab Take 1  tablet (750 mg total) by mouth 2 (two) times daily. 60 tablet 1    ondansetron (ZOFRAN) 8 MG tablet Take 1 tablet (8 mg total) by mouth every 8 (eight) hours as needed for Nausea. 30 tablet 3    prochlorperazine (COMPAZINE) 10 MG tablet Take 1 tablet (10 mg total) by mouth 3 (three) times daily as needed (use when zofran does not work). 30 tablet 3    sulfamethoxazole-trimethoprim 800-160mg (BACTRIM DS) 800-160 mg Tab Take 1 tablet by mouth on Mondays, Wednesdays, and Fridays. 18 tablet 0    temozolomide (TEMODAR) 140 MG capsule Take 1 capsule (140 mg total) by mouth once daily Take as directed days 1-42 (6 weeks). Take on an empty stomach.. 42 capsule 0     No current facility-administered medications for this visit.       Review of Systems   Constitutional: Negative for activity change, appetite change, chills, diaphoresis, fatigue, fever and unexpected weight change.   HENT: Negative for nosebleeds and trouble swallowing.    Eyes: Negative for visual disturbance.   Respiratory: Negative for cough, chest tightness, shortness of breath and wheezing.    Cardiovascular: Negative for chest pain and leg swelling.   Gastrointestinal: Negative for abdominal distention, abdominal pain, blood in stool, constipation, diarrhea, nausea and vomiting.   Endocrine: Negative for cold intolerance and heat intolerance.   Genitourinary: Negative for difficulty urinating and dysuria.   Musculoskeletal: Negative for arthralgias and back pain.   Skin: Negative for color change.   Neurological: Negative for dizziness, weakness, light-headedness, numbness and headaches.   Hematological: Negative for adenopathy. Does not bruise/bleed easily.   Psychiatric/Behavioral: Negative for confusion and sleep disturbance.       ECOG Performance Status:     ECOG SCORE    0 - Fully active-able to carry on all pre-disease performance without restriction         Objective:      Vitals:   Vitals:    05/16/22 1622   BP: 134/89   Pulse: 74   Resp: 16  "  Temp: 97.6 °F (36.4 °C)   TempSrc: Temporal   SpO2: 98%   Weight: 72.9 kg (160 lb 11.5 oz)   Height: 5' 6" (1.676 m)     BMI: Body mass index is 25.94 kg/m².       Physical Exam  Constitutional:       General: He is not in acute distress.     Appearance: Normal appearance. He is not ill-appearing.   HENT:      Head: Normocephalic.      Mouth/Throat:      Pharynx: No oropharyngeal exudate or posterior oropharyngeal erythema.   Eyes:      General: No scleral icterus.     Extraocular Movements: Extraocular movements intact.      Conjunctiva/sclera: Conjunctivae normal.      Pupils: Pupils are equal, round, and reactive to light.   Cardiovascular:      Rate and Rhythm: Normal rate and regular rhythm.      Heart sounds: No murmur heard.    No friction rub. No gallop.   Pulmonary:      Effort: Pulmonary effort is normal. No respiratory distress.      Breath sounds: No stridor. No wheezing, rhonchi or rales.   Abdominal:      General: Bowel sounds are normal. There is no distension.      Palpations: Abdomen is soft. There is no mass.      Tenderness: There is no abdominal tenderness. There is no guarding or rebound.   Musculoskeletal:         General: Normal range of motion.      Cervical back: Normal range of motion and neck supple.      Right lower leg: No edema.      Left lower leg: No edema.   Skin:     General: Skin is warm and dry.   Neurological:      General: No focal deficit present.      Mental Status: He is alert.         Laboratory Data:   Recent Results (from the past 168 hour(s))   Comprehensive Metabolic Panel    Collection Time: 05/16/22  3:17 PM   Result Value Ref Range    Sodium 136 136 - 145 mmol/L    Potassium 3.9 3.5 - 5.1 mmol/L    Chloride 102 95 - 110 mmol/L    CO2 23 23 - 29 mmol/L    Glucose 97 70 - 110 mg/dL    BUN 7 6 - 20 mg/dL    Creatinine 0.9 0.5 - 1.4 mg/dL    Calcium 9.3 8.7 - 10.5 mg/dL    Total Protein 6.9 6.0 - 8.4 g/dL    Albumin 4.3 3.5 - 5.2 g/dL    Total Bilirubin 0.4 0.1 - 1.0 " mg/dL    Alkaline Phosphatase 71 55 - 135 U/L    AST 26 10 - 40 U/L    ALT 32 10 - 44 U/L    Anion Gap 11 8 - 16 mmol/L    eGFR if African American >60.0 >60 mL/min/1.73 m^2    eGFR if non African American >60.0 >60 mL/min/1.73 m^2   CBC Auto Differential    Collection Time: 05/16/22  3:17 PM   Result Value Ref Range    WBC 7.55 3.90 - 12.70 K/uL    RBC 4.76 4.60 - 6.20 M/uL    Hemoglobin 13.9 (L) 14.0 - 18.0 g/dL    Hematocrit 41.2 40.0 - 54.0 %    MCV 87 82 - 98 fL    MCH 29.2 27.0 - 31.0 pg    MCHC 33.7 32.0 - 36.0 g/dL    RDW 12.2 11.5 - 14.5 %    Platelets 282 150 - 450 K/uL    MPV 12.0 9.2 - 12.9 fL    Immature Granulocytes 0.4 0.0 - 0.5 %    Gran # (ANC) 4.9 1.8 - 7.7 K/uL    Immature Grans (Abs) 0.03 0.00 - 0.04 K/uL    Lymph # 1.6 1.0 - 4.8 K/uL    Mono # 0.8 0.3 - 1.0 K/uL    Eos # 0.1 0.0 - 0.5 K/uL    Baso # 0.06 0.00 - 0.20 K/uL    nRBC 0 0 /100 WBC    Gran % 64.7 38.0 - 73.0 %    Lymph % 21.5 18.0 - 48.0 %    Mono % 11.0 4.0 - 15.0 %    Eosinophil % 1.6 0.0 - 8.0 %    Basophil % 0.8 0.0 - 1.9 %    Differential Method Automated          Imaging:     MRI Brain W WO Contrast    Result Date: 4/28/2022  EXAMINATION: MRI BRAIN W WO CONTRAST CLINICAL HISTORY: Low grade glioma; Malignant neoplasm of frontal lobe TECHNIQUE: Multiplanar multisequence MR imaging of the brain was performed before and after the administration of 8 mL Gadavist intravenous contrast. COMPARISON: MRI brain 02/17/2022 [id number], 04/30/2021, 04/20/2021. CT head 05/29/2020. FINDINGS: Postoperative changes of right frontal craniotomy for right frontal lesion resection.  Interval resolution of postoperative gas and layering hemorrhage within the resection cavity.  There is a thin area of enhancement surrounding the resection margins without nodular contour with some component of diffusion restriction, overall similar to prior, likely post-treatment change.  Previous T2 FLAIR hyperintensity along the posterolateral aspect of the resection  cavity is reduced there is marginated T2 flair signal hyperintensity along the ventral aspect of the resection cavity as well as smaller component along the dorsal aspect the resection cavity concerning for components of residual nonenhancing lesion previously felt to represent postoperative fluid with reduced mass effect from prior.  This is most pronounced ventrally with component measuring approximately a 0.7 x 3.2 cm in AP by TV dimensions. There is slight marginated diffusion abnormality about the resection cavity felt to represent artifact from postoperative hemorrhage.  There is no restricted diffusion elsewhere to suggest acute or recent infarction.  Component of T2 flair signal hyperintensity along the margin resection cavity felt to represent gliosis and scarring.  There is reduced mass effect related to resection cavity with resolution of previous midline shift and re-expansion right frontal horn lateral ventricle.  No evidence for current hydrocephalus. Again noted incidental probable punctate developmental venous anomaly within the right inferior frontal lobe, similar to prior. The T2 skull base flow voids are preserved.  Mild opacification posterior left ethmoidal air cells. This report was flagged in Epic as abnormal.     Evolving operative change from right frontal lesion resection via right frontal craniotomy. Slightly more apparent marginated T2 flair hyperintensity along the ventral aspect the resection cavity felt to represent lesion residual and to lesser degree dorsal aspect resection cavity.  There is no significant new parenchymal signal abnormality to suggest lesional worsening with study to represent new baseline with resolution of previous postoperative gas and hemorrhage within the resection cavity as well as reduced edema. Thin marginated enhancement about the resection cavity felt to be postoperative no evidence for nodular enhancement. Clinical correlation and continued follow-up  advised. Electronically signed by resident: Chris Carvajal Date:    04/28/2022 Time:    08:27 Electronically signed by: Duc Lynch DO Date:    04/28/2022 Time:    09:41      Assessment:       1. Malignant astrocytoma of frontal lobe    2. Immunodeficiency due to drug therapy    3. Localization-related (focal) (partial) symptomatic epilepsy and epileptic syndromes with complex partial seizures, not intractable, without status epilepticus    4. Normocytic anemia    5. Alcohol consumption of more than four drinks per day           Plan:       Problem List Items Addressed This Visit        Neuro    Localization-related (focal) (partial) symptomatic epilepsy and epileptic syndromes with complex partial seizures, not intractable, without status epilepticus    Current Assessment & Plan     No recurrent seizures  -continue keppra              Psychiatric    Alcohol consumption of more than four drinks per day    Current Assessment & Plan     Still using etoh although he has cut down. No withdrawal symptoms  -counseled him on 2 drinks / day or less as his goal.              Immunology/Multi System    Immunodeficiency due to drug therapy    Current Assessment & Plan     Afebrile, ANC sufficient for treatment  -monitor cbc  -continue bactrim ppx              Oncology    Malignant astrocytoma of frontal lobe - Primary    Overview     2/17/22 STR for recurrent WHO grade II astrocytoma, IDH mutated.  5/9/22 Started chemoradiation with temozolomide           Current Assessment & Plan     Doing well without any adverse side effects of chemoradiation.  -weekly labs  -follow up in two weeks  -supportive meds: continue zofran 1 hour prior to TMZ           Normocytic anemia    Current Assessment & Plan     Mild asymptomatic.  Secondary to chemotherapy.  etoh use may also contribute to marrow suppression  -monitor cbc                 No orders of the defined types were placed in this encounter.    Route Chart for Scheduling    Med  Onc Chart Routing      Follow up with physician Other. Follow up with me 5/30   Follow up with DARA    Labs CMP and CBC   Lab interval:  Weekly x 4 weeks starting 5/23   Imaging None      Pharmacy appointment No pharmacy appointment needed      Other referrals No additional referrals needed         Treatment Plan Information   OP TEMOZOLOMIDE DAILY (6 WEEKS) WITH CONCURRENT RADIATION   Sha Pan MD   Upcoming Treatment Dates - OP TEMOZOLOMIDE DAILY (6 WEEKS) WITH CONCURRENT RADIATION    No upcoming days in selected categories.          Sha Pan MD  Hematology Oncology

## 2022-05-16 NOTE — ASSESSMENT & PLAN NOTE
Mild asymptomatic.  Secondary to chemotherapy.  etoh use may also contribute to marrow suppression  -monitor cbc

## 2022-05-17 PROCEDURE — 77386 HC IMRT, COMPLEX: CPT | Performed by: RADIOLOGY

## 2022-05-17 PROCEDURE — 77014 HC CT GUIDANCE RADIATION THERAPY FLDS PLACEMENT: CPT | Mod: TC | Performed by: RADIOLOGY

## 2022-05-17 PROCEDURE — 77014 PR  CT GUIDANCE PLACEMENT RAD THERAPY FIELDS: ICD-10-PCS | Mod: 26,,, | Performed by: RADIOLOGY

## 2022-05-17 PROCEDURE — 77014 PR  CT GUIDANCE PLACEMENT RAD THERAPY FIELDS: CPT | Mod: 26,,, | Performed by: RADIOLOGY

## 2022-05-18 ENCOUNTER — PATIENT MESSAGE (OUTPATIENT)
Dept: NEUROLOGY | Facility: CLINIC | Age: 37
End: 2022-05-18
Payer: COMMERCIAL

## 2022-05-18 DIAGNOSIS — G40.209 LOCALIZATION-RELATED (FOCAL) (PARTIAL) SYMPTOMATIC EPILEPSY AND EPILEPTIC SYNDROMES WITH COMPLEX PARTIAL SEIZURES, NOT INTRACTABLE, WITHOUT STATUS EPILEPTICUS: ICD-10-CM

## 2022-05-18 PROCEDURE — 77014 HC CT GUIDANCE RADIATION THERAPY FLDS PLACEMENT: CPT | Mod: TC | Performed by: RADIOLOGY

## 2022-05-18 PROCEDURE — 77386 HC IMRT, COMPLEX: CPT | Performed by: RADIOLOGY

## 2022-05-18 PROCEDURE — 77014 PR  CT GUIDANCE PLACEMENT RAD THERAPY FIELDS: CPT | Mod: 26,,, | Performed by: RADIOLOGY

## 2022-05-18 PROCEDURE — 77014 PR  CT GUIDANCE PLACEMENT RAD THERAPY FIELDS: ICD-10-PCS | Mod: 26,,, | Performed by: RADIOLOGY

## 2022-05-18 RX ORDER — LEVETIRACETAM 750 MG/1
750 TABLET ORAL 2 TIMES DAILY
Qty: 60 TABLET | Refills: 0 | Status: SHIPPED | OUTPATIENT
Start: 2022-05-18 | End: 2022-05-24 | Stop reason: SDUPTHER

## 2022-05-19 ENCOUNTER — SPECIALTY PHARMACY (OUTPATIENT)
Dept: PHARMACY | Facility: CLINIC | Age: 37
End: 2022-05-19
Payer: COMMERCIAL

## 2022-05-19 ENCOUNTER — DOCUMENTATION ONLY (OUTPATIENT)
Dept: RADIATION ONCOLOGY | Facility: CLINIC | Age: 37
End: 2022-05-19
Payer: COMMERCIAL

## 2022-05-19 ENCOUNTER — PATIENT MESSAGE (OUTPATIENT)
Dept: PHARMACY | Facility: CLINIC | Age: 37
End: 2022-05-19
Payer: COMMERCIAL

## 2022-05-19 PROCEDURE — 77014 PR  CT GUIDANCE PLACEMENT RAD THERAPY FIELDS: CPT | Mod: 26,,, | Performed by: RADIOLOGY

## 2022-05-19 PROCEDURE — 77014 PR  CT GUIDANCE PLACEMENT RAD THERAPY FIELDS: ICD-10-PCS | Mod: 26,,, | Performed by: RADIOLOGY

## 2022-05-19 PROCEDURE — 77386 HC IMRT, COMPLEX: CPT | Performed by: RADIOLOGY

## 2022-05-19 PROCEDURE — 77014 HC CT GUIDANCE RADIATION THERAPY FLDS PLACEMENT: CPT | Mod: TC | Performed by: RADIOLOGY

## 2022-05-19 NOTE — PLAN OF CARE
Day 9 of outpatient radiation to the brain. Pt denies headaches, vision changes, falls. Does feel fatigued but does not think it has to do with radiation.

## 2022-05-23 ENCOUNTER — LAB VISIT (OUTPATIENT)
Dept: LAB | Facility: HOSPITAL | Age: 37
End: 2022-05-23
Attending: STUDENT IN AN ORGANIZED HEALTH CARE EDUCATION/TRAINING PROGRAM
Payer: COMMERCIAL

## 2022-05-23 DIAGNOSIS — C71.9 ASTROCYTOMA BRAIN TUMOR: ICD-10-CM

## 2022-05-23 LAB
ALBUMIN SERPL BCP-MCNC: 4.3 G/DL (ref 3.5–5.2)
ALP SERPL-CCNC: 66 U/L (ref 55–135)
ALT SERPL W/O P-5'-P-CCNC: 34 U/L (ref 10–44)
ANION GAP SERPL CALC-SCNC: 14 MMOL/L (ref 8–16)
AST SERPL-CCNC: 22 U/L (ref 10–40)
BASOPHILS # BLD AUTO: 0.06 K/UL (ref 0–0.2)
BASOPHILS NFR BLD: 0.8 % (ref 0–1.9)
BILIRUB SERPL-MCNC: 0.5 MG/DL (ref 0.1–1)
BUN SERPL-MCNC: 11 MG/DL (ref 6–20)
CALCIUM SERPL-MCNC: 9.7 MG/DL (ref 8.7–10.5)
CHLORIDE SERPL-SCNC: 104 MMOL/L (ref 95–110)
CO2 SERPL-SCNC: 22 MMOL/L (ref 23–29)
CREAT SERPL-MCNC: 1.2 MG/DL (ref 0.5–1.4)
DIFFERENTIAL METHOD: NORMAL
EOSINOPHIL # BLD AUTO: 0.1 K/UL (ref 0–0.5)
EOSINOPHIL NFR BLD: 1.8 % (ref 0–8)
ERYTHROCYTE [DISTWIDTH] IN BLOOD BY AUTOMATED COUNT: 12.4 % (ref 11.5–14.5)
EST. GFR  (AFRICAN AMERICAN): >60 ML/MIN/1.73 M^2
EST. GFR  (NON AFRICAN AMERICAN): >60 ML/MIN/1.73 M^2
GLUCOSE SERPL-MCNC: 94 MG/DL (ref 70–110)
HCT VFR BLD AUTO: 43 % (ref 40–54)
HGB BLD-MCNC: 14.6 G/DL (ref 14–18)
IMM GRANULOCYTES # BLD AUTO: 0.03 K/UL (ref 0–0.04)
IMM GRANULOCYTES NFR BLD AUTO: 0.4 % (ref 0–0.5)
LYMPHOCYTES # BLD AUTO: 1.7 K/UL (ref 1–4.8)
LYMPHOCYTES NFR BLD: 22.5 % (ref 18–48)
MCH RBC QN AUTO: 30 PG (ref 27–31)
MCHC RBC AUTO-ENTMCNC: 34 G/DL (ref 32–36)
MCV RBC AUTO: 88 FL (ref 82–98)
MONOCYTES # BLD AUTO: 0.8 K/UL (ref 0.3–1)
MONOCYTES NFR BLD: 10.7 % (ref 4–15)
NEUTROPHILS # BLD AUTO: 4.9 K/UL (ref 1.8–7.7)
NEUTROPHILS NFR BLD: 63.8 % (ref 38–73)
NRBC BLD-RTO: 0 /100 WBC
PLATELET # BLD AUTO: 285 K/UL (ref 150–450)
PMV BLD AUTO: 12.2 FL (ref 9.2–12.9)
POTASSIUM SERPL-SCNC: 4.5 MMOL/L (ref 3.5–5.1)
PROT SERPL-MCNC: 6.9 G/DL (ref 6–8.4)
RBC # BLD AUTO: 4.87 M/UL (ref 4.6–6.2)
SODIUM SERPL-SCNC: 140 MMOL/L (ref 136–145)
WBC # BLD AUTO: 7.6 K/UL (ref 3.9–12.7)

## 2022-05-23 PROCEDURE — 80053 COMPREHEN METABOLIC PANEL: CPT | Performed by: STUDENT IN AN ORGANIZED HEALTH CARE EDUCATION/TRAINING PROGRAM

## 2022-05-23 PROCEDURE — 77014 PR  CT GUIDANCE PLACEMENT RAD THERAPY FIELDS: ICD-10-PCS | Mod: 26,,, | Performed by: RADIOLOGY

## 2022-05-23 PROCEDURE — 77336 RADIATION PHYSICS CONSULT: CPT | Performed by: RADIOLOGY

## 2022-05-23 PROCEDURE — 77386 HC IMRT, COMPLEX: CPT | Performed by: RADIOLOGY

## 2022-05-23 PROCEDURE — 77014 HC CT GUIDANCE RADIATION THERAPY FLDS PLACEMENT: CPT | Mod: TC | Performed by: RADIOLOGY

## 2022-05-23 PROCEDURE — 36415 COLL VENOUS BLD VENIPUNCTURE: CPT | Performed by: STUDENT IN AN ORGANIZED HEALTH CARE EDUCATION/TRAINING PROGRAM

## 2022-05-23 PROCEDURE — 77014 PR  CT GUIDANCE PLACEMENT RAD THERAPY FIELDS: CPT | Mod: 26,,, | Performed by: RADIOLOGY

## 2022-05-23 PROCEDURE — 85025 COMPLETE CBC W/AUTO DIFF WBC: CPT | Performed by: STUDENT IN AN ORGANIZED HEALTH CARE EDUCATION/TRAINING PROGRAM

## 2022-05-24 ENCOUNTER — OFFICE VISIT (OUTPATIENT)
Dept: NEUROLOGY | Facility: CLINIC | Age: 37
End: 2022-05-24
Payer: COMMERCIAL

## 2022-05-24 VITALS
DIASTOLIC BLOOD PRESSURE: 85 MMHG | SYSTOLIC BLOOD PRESSURE: 140 MMHG | HEART RATE: 63 BPM | BODY MASS INDEX: 26.03 KG/M2 | WEIGHT: 161.25 LBS | RESPIRATION RATE: 18 BRPM

## 2022-05-24 DIAGNOSIS — C71.9 ASTROCYTOMA: ICD-10-CM

## 2022-05-24 DIAGNOSIS — G40.209 LOCALIZATION-RELATED (FOCAL) (PARTIAL) SYMPTOMATIC EPILEPSY AND EPILEPTIC SYNDROMES WITH COMPLEX PARTIAL SEIZURES, NOT INTRACTABLE, WITHOUT STATUS EPILEPTICUS: Primary | ICD-10-CM

## 2022-05-24 PROCEDURE — 77014 PR  CT GUIDANCE PLACEMENT RAD THERAPY FIELDS: CPT | Mod: 26,,, | Performed by: RADIOLOGY

## 2022-05-24 PROCEDURE — 99999 PR PBB SHADOW E&M-EST. PATIENT-LVL III: CPT | Mod: PBBFAC,,, | Performed by: PSYCHIATRY & NEUROLOGY

## 2022-05-24 PROCEDURE — 99214 PR OFFICE/OUTPT VISIT, EST, LEVL IV, 30-39 MIN: ICD-10-PCS | Mod: S$GLB,,, | Performed by: PSYCHIATRY & NEUROLOGY

## 2022-05-24 PROCEDURE — 99214 OFFICE O/P EST MOD 30 MIN: CPT | Mod: S$GLB,,, | Performed by: PSYCHIATRY & NEUROLOGY

## 2022-05-24 PROCEDURE — 3077F PR MOST RECENT SYSTOLIC BLOOD PRESSURE >= 140 MM HG: ICD-10-PCS | Mod: CPTII,S$GLB,, | Performed by: PSYCHIATRY & NEUROLOGY

## 2022-05-24 PROCEDURE — 3008F PR BODY MASS INDEX (BMI) DOCUMENTED: ICD-10-PCS | Mod: CPTII,S$GLB,, | Performed by: PSYCHIATRY & NEUROLOGY

## 2022-05-24 PROCEDURE — 1159F MED LIST DOCD IN RCRD: CPT | Mod: CPTII,S$GLB,, | Performed by: PSYCHIATRY & NEUROLOGY

## 2022-05-24 PROCEDURE — 3077F SYST BP >= 140 MM HG: CPT | Mod: CPTII,S$GLB,, | Performed by: PSYCHIATRY & NEUROLOGY

## 2022-05-24 PROCEDURE — 1159F PR MEDICATION LIST DOCUMENTED IN MEDICAL RECORD: ICD-10-PCS | Mod: CPTII,S$GLB,, | Performed by: PSYCHIATRY & NEUROLOGY

## 2022-05-24 PROCEDURE — 77386 HC IMRT, COMPLEX: CPT | Performed by: RADIOLOGY

## 2022-05-24 PROCEDURE — 3008F BODY MASS INDEX DOCD: CPT | Mod: CPTII,S$GLB,, | Performed by: PSYCHIATRY & NEUROLOGY

## 2022-05-24 PROCEDURE — 3079F PR MOST RECENT DIASTOLIC BLOOD PRESSURE 80-89 MM HG: ICD-10-PCS | Mod: CPTII,S$GLB,, | Performed by: PSYCHIATRY & NEUROLOGY

## 2022-05-24 PROCEDURE — 77014 PR  CT GUIDANCE PLACEMENT RAD THERAPY FIELDS: ICD-10-PCS | Mod: 26,,, | Performed by: RADIOLOGY

## 2022-05-24 PROCEDURE — 99999 PR PBB SHADOW E&M-EST. PATIENT-LVL III: ICD-10-PCS | Mod: PBBFAC,,, | Performed by: PSYCHIATRY & NEUROLOGY

## 2022-05-24 PROCEDURE — 3079F DIAST BP 80-89 MM HG: CPT | Mod: CPTII,S$GLB,, | Performed by: PSYCHIATRY & NEUROLOGY

## 2022-05-24 PROCEDURE — 77014 HC CT GUIDANCE RADIATION THERAPY FLDS PLACEMENT: CPT | Mod: TC | Performed by: RADIOLOGY

## 2022-05-24 RX ORDER — LEVETIRACETAM 750 MG/1
750 TABLET ORAL 2 TIMES DAILY
Qty: 60 TABLET | Refills: 11 | Status: SHIPPED | OUTPATIENT
Start: 2022-05-24 | End: 2023-06-08

## 2022-05-24 NOTE — PROGRESS NOTES
"Date of service:  05/24/2022     Chief complaint:  Seizures    Interval History:  The patient is a 36 y.o. male with a WHO II glioma S/P resection in 2018 with a history of seizures, who I saw once previously in 2020.  Since I last saw him, he has been taking his Keppra with no apparent side effects.  He reports no seizures.  Of note, surveillance MRI performed in February showed progression of tumor.  He underwent repeat resection, and pathology showed WHO II astrocytoma.  He has been receiving chemo and radiation.      History of present illness:  The patient is a 36 y.o. male with a WHO II glioma S/P resection in 2018 we have been asked to see for evaluation of episodes suspicious for seizures.  He saw Dr. Noel in the hospital.  This is my first time seeing him.  He is seen with his mother who supplies additional history. The patient's first seizure occurred in 2018, and the associated workup lead to the diagnosis of the tumor.  He was then seizure free until 5/20, when he had 2 seizures in a single day.  Of note, he had consumed EtOH shortly before the seizures on both occasions. With respect to aura, the patient reports nothing.  His seizure is characterized by behavioral arrest, eye deviation (unsure which side), and generalized stiffening.  This component of this spell lasts for "a few minutes."      Potential Epilepsy Risk Factors:   Pregnancy/Labor/Delivery - twin, preeclampsia, delivered "1 week early" per mother, was able to go to regular nursery  Febrile seizures - none  Head injury  - none (ran into tree at age 11, was briefly observed in hospital)  CNS infection - none     Stroke - none  Family Hx of Sz - none  Brain tumor - +    AED Treatments  Present regimen  Keppra 1000 mg BID (mild morning drowsiness at this dose)    Prior treatments  NA    Not tried  acetazolamide (Diamox, AZM)  Amantadine  brivitiracetam (Briviact, BRV)  carbamazepine (Tegretol, CBZ)  clobazam (Onfi or Frizium, " CLB)  ethosuximide (Zarontin, ESM)  eslicarbazine (Aptiom, ESL)  felbamate (felbatol, FBM)  gabapentin (Neurontin, GPN)  lacosamide (Vimpat, LCS)   lamotrigine (Lamictal, LTG)   methsuximide (Celontin, MSM)  oxcarbazepine (Trileptal OXC)  perampanel (Fycompa, FCP)   phenobarbital (Pb)  phenytoin (Dilantin, PHT)  pregabalin (Lyrica, PGB)  primidone (Mysoline, PRM)  rufinamide (Banzel, RUF)  tiagabine (Gabatril,  TGB)  topiramate (Topamax, TPM)  viagabatrin, (Sabril, VGB)  vagal nerve stimulator (VNS)  valproic acid (Depakote, VPA)  zonisamide (Zonegran, ZNA)  Benzodiazepines  diazepam - rectal (Diastatl)  diazepam - oral (Valium, DZ)  clonazepam (Klonopin, CZP)  clorazepate (Tranxene, CLZ)  Ativan  Brain Stimulation  Vagal Nerve Stimulation-n/a  DBS- n/a    Past Medical History:   Diagnosis Date    Ataxia 5/29/2020    Cancer     brain tumor    Epilepsy     Normocytic anemia 3/8/2022       Past Surgical History:   Procedure Laterality Date    CRANIOTOMY FOR EXCISION OF INTRACRANIAL TUMOR Right 8/10/2018    Procedure: CRANIOTOMY, FOR INTRACRANIAL NEOPLASM EXCISION Right eyebrow incision / LAYNE BrainPath craniotomy for tumor resection;  Surgeon: Lefty Paulson MD;  Location: [facility] OR;  Service: Neurosurgery;  Laterality: Right;    CRANIOTOMY USING FRAMELESS STEREOTAXY Right 2/17/2022    Procedure: CRANIOTOMY, USING FRAMELESS STEREOTAXY;  Surgeon: Chago Van MD;  Location: [facility] OR [address] [place] [address];  Service: Neurosurgery;  Laterality: Right;    cyst removed      SURGICAL REMOVAL OF PILONIDAL CYST         Family History   Problem Relation Age of Onset    Cancer Maternal Grandfather     Prostate cancer Maternal Grandfather     Cancer Paternal Grandmother     Breast cancer Paternal Grandmother     Hypertension Mother     No Known Problems Sister     No Known Problems Brother        Social History     Socioeconomic History    Marital status: Single    Years of education: 4 years    Highest education level:  Bachelor's degree (e.g., BA, AB, BS)   Tobacco Use    Smoking status: Former Smoker     Packs/day: 1.00     Years: 8.00     Pack years: 8.00     Types: Cigarettes     Start date: 2010     Quit date: 8/14/2018     Years since quitting: 3.7    Smokeless tobacco: Never Used   Substance and Sexual Activity    Alcohol use: Yes     Alcohol/week: 42.0 standard drinks     Types: 42 Cans of beer per week     Comment: 6 pack daily     Drug use: No    Sexual activity: Yes        Review of patient's allergies indicates:  No Known Allergies     Review of Systems  The patient's ROS is noncontributory except as noted above.     Physical exam:  BP (!) 140/85 (BP Location: Left arm, Patient Position: Sitting, BP Method: Medium (Automatic))   Pulse 63   Resp 18   Wt 73.1 kg (161 lb 4.3 oz)   BMI 26.03 kg/m²   General: Well developed, well nourished.  No acute distress.  ENT: Mucus membranes moist.  Atraumatic external nose and ears.  Lymphatic: No apparent lymphadenopathy.  Cardiovascular: Regular rate and rhythm.  Pulmonary: No increased work of breathing.  Abdomen/GI: No guarding.  Musculoskeletal: No obvious joint deformities, moves all extremities well.    Neurological exam:  Mental status: Awake and alert.  Oriented x4.  Speech fluent and appropriate.  Recent and remote memory appear to be intact.  Fund of knowledge normal.  Cranial nerves: Pupils equal round and reactive to light, extraocular movements intact, facial strength and sensation intact bilaterally, palate and tongue midline, hearing grossly intact bilaterally.  Motor: 5 out of 5 strength throughout the upper and lower extremities bilaterally. Normal bulk and tone.  Sensation: Intact to light touch and temperature bilaterally.  DTR: 2+ at the knees and biceps bilaterally.  Coordination: Finger-nose-finger testing intact bilaterally.  Gait: Normal gait. Good tandem.    Data base:  Notes from his hospitalization were reviewed.  Briefly summarized, these discuss  "the patient's history of seizures and previously resected WHO II glioma.    Labs (5/20):  CMP: unremarkable  CBC: unremarkable    MRI brain (4/22):  "Evolving operative change from right frontal lesion resection via right frontal craniotomy.  Slightly more apparent marginated T2 flair hyperintensity along the ventral aspect the resection cavity felt to represent lesion residual and to lesser degree dorsal aspect resection cavity.  There is no significant new parenchymal signal abnormality to suggest lesional worsening with study to represent new baseline with resolution of previous postoperative gas and hemorrhage within the resection cavity as well as reduced edema.  Thin marginated enhancement about the resection cavity felt to be postoperative no evidence for nodular enhancement.  Clinical correlation and continued follow-up advised."    EEG:  "IMPRESSION:   Abnormal EEG - Waking and sleeping back ground were normal but there is a synchronous slowing noted the temporal leads which was little more frequently noted on the right side but no evidence of spike or sharp wave activity.  This indicates bitemporal dysfunction the pattern is somewhat nonspecific and does not indicate a specific etiology.     CLINICAL CORRELATION:  The patient is a 34-year-old male that the right frontal astrocytoma resected in 2018.  Recently a seizure thought to be related to consuming alcohol.  This recording does not show evidence for an irritative or an epileptic process.  There is asynchronous temporal slowing which is much more prominent the right correlates with the localization of the previously resected astrocytoma."    Assessment and plan:  The patient is a 36 y.o. male we have been asked to see for evaluation for events worrisome for seizures. The differential includes a focal onset epilepsy symptomatic of gliosis associated with his prior glioma in the anterior right frontal lobe.   As far as medications go, we will continue " him on Keppra 750 mg BID. Medication side effects were discussed with the patient.  State law as it pertains to driving for individuals with seizures was discussed.  The patient was also counseled on seizure safety. We will plan on seeing the patient back in a few months.  He is to call us for further issues such as seizures or medication side effects in the meantime.  I have also encouraged him to continue following with Heme/Onc and to adhere to their plan for serial neuroimaging.

## 2022-05-25 PROCEDURE — 77386 HC IMRT, COMPLEX: CPT | Performed by: RADIOLOGY

## 2022-05-25 PROCEDURE — 77014 PR  CT GUIDANCE PLACEMENT RAD THERAPY FIELDS: CPT | Mod: 26,,, | Performed by: RADIOLOGY

## 2022-05-25 PROCEDURE — 77014 PR  CT GUIDANCE PLACEMENT RAD THERAPY FIELDS: ICD-10-PCS | Mod: 26,,, | Performed by: RADIOLOGY

## 2022-05-25 PROCEDURE — 77014 HC CT GUIDANCE RADIATION THERAPY FLDS PLACEMENT: CPT | Mod: TC | Performed by: RADIOLOGY

## 2022-05-26 ENCOUNTER — DOCUMENTATION ONLY (OUTPATIENT)
Dept: RADIATION ONCOLOGY | Facility: CLINIC | Age: 37
End: 2022-05-26
Payer: COMMERCIAL

## 2022-05-26 PROCEDURE — 77014 HC CT GUIDANCE RADIATION THERAPY FLDS PLACEMENT: CPT | Mod: TC | Performed by: RADIOLOGY

## 2022-05-26 PROCEDURE — 77014 PR  CT GUIDANCE PLACEMENT RAD THERAPY FIELDS: ICD-10-PCS | Mod: 26,,, | Performed by: RADIOLOGY

## 2022-05-26 PROCEDURE — 77014 PR  CT GUIDANCE PLACEMENT RAD THERAPY FIELDS: CPT | Mod: 26,,, | Performed by: RADIOLOGY

## 2022-05-26 PROCEDURE — 77386 HC IMRT, COMPLEX: CPT | Performed by: RADIOLOGY

## 2022-05-27 PROCEDURE — 77014 PR  CT GUIDANCE PLACEMENT RAD THERAPY FIELDS: CPT | Mod: 26,,, | Performed by: RADIOLOGY

## 2022-05-27 PROCEDURE — 77014 HC CT GUIDANCE RADIATION THERAPY FLDS PLACEMENT: CPT | Mod: TC | Performed by: RADIOLOGY

## 2022-05-27 PROCEDURE — 77386 HC IMRT, COMPLEX: CPT | Performed by: RADIOLOGY

## 2022-05-27 PROCEDURE — 77014 PR  CT GUIDANCE PLACEMENT RAD THERAPY FIELDS: ICD-10-PCS | Mod: 26,,, | Performed by: RADIOLOGY

## 2022-05-30 ENCOUNTER — LAB VISIT (OUTPATIENT)
Dept: LAB | Facility: HOSPITAL | Age: 37
End: 2022-05-30
Attending: STUDENT IN AN ORGANIZED HEALTH CARE EDUCATION/TRAINING PROGRAM
Payer: COMMERCIAL

## 2022-05-30 ENCOUNTER — OFFICE VISIT (OUTPATIENT)
Dept: HEMATOLOGY/ONCOLOGY | Facility: CLINIC | Age: 37
End: 2022-05-30
Payer: COMMERCIAL

## 2022-05-30 VITALS
DIASTOLIC BLOOD PRESSURE: 86 MMHG | WEIGHT: 160.5 LBS | HEART RATE: 57 BPM | HEIGHT: 66 IN | SYSTOLIC BLOOD PRESSURE: 130 MMHG | TEMPERATURE: 98 F | BODY MASS INDEX: 25.79 KG/M2 | OXYGEN SATURATION: 100 % | RESPIRATION RATE: 18 BRPM

## 2022-05-30 DIAGNOSIS — D64.9 NORMOCYTIC ANEMIA: ICD-10-CM

## 2022-05-30 DIAGNOSIS — Z79.899 IMMUNODEFICIENCY DUE TO DRUG THERAPY: ICD-10-CM

## 2022-05-30 DIAGNOSIS — R03.0 ELEVATED BP WITHOUT DIAGNOSIS OF HYPERTENSION: ICD-10-CM

## 2022-05-30 DIAGNOSIS — C71.9 ASTROCYTOMA BRAIN TUMOR: ICD-10-CM

## 2022-05-30 DIAGNOSIS — C71.1 MALIGNANT ASTROCYTOMA OF FRONTAL LOBE: Primary | ICD-10-CM

## 2022-05-30 DIAGNOSIS — G40.209 LOCALIZATION-RELATED (FOCAL) (PARTIAL) SYMPTOMATIC EPILEPSY AND EPILEPTIC SYNDROMES WITH COMPLEX PARTIAL SEIZURES, NOT INTRACTABLE, WITHOUT STATUS EPILEPTICUS: ICD-10-CM

## 2022-05-30 DIAGNOSIS — D84.821 IMMUNODEFICIENCY DUE TO DRUG THERAPY: ICD-10-CM

## 2022-05-30 LAB
ALBUMIN SERPL BCP-MCNC: 4.2 G/DL (ref 3.5–5.2)
ALP SERPL-CCNC: 80 U/L (ref 55–135)
ALT SERPL W/O P-5'-P-CCNC: 37 U/L (ref 10–44)
ANION GAP SERPL CALC-SCNC: 10 MMOL/L (ref 8–16)
AST SERPL-CCNC: 26 U/L (ref 10–40)
BASOPHILS # BLD AUTO: 0.05 K/UL (ref 0–0.2)
BASOPHILS NFR BLD: 0.7 % (ref 0–1.9)
BILIRUB SERPL-MCNC: 0.5 MG/DL (ref 0.1–1)
BUN SERPL-MCNC: 11 MG/DL (ref 6–20)
CALCIUM SERPL-MCNC: 9.3 MG/DL (ref 8.7–10.5)
CHLORIDE SERPL-SCNC: 103 MMOL/L (ref 95–110)
CO2 SERPL-SCNC: 23 MMOL/L (ref 23–29)
CREAT SERPL-MCNC: 1.1 MG/DL (ref 0.5–1.4)
DIFFERENTIAL METHOD: ABNORMAL
EOSINOPHIL # BLD AUTO: 0.2 K/UL (ref 0–0.5)
EOSINOPHIL NFR BLD: 2.1 % (ref 0–8)
ERYTHROCYTE [DISTWIDTH] IN BLOOD BY AUTOMATED COUNT: 12.6 % (ref 11.5–14.5)
EST. GFR  (AFRICAN AMERICAN): >60 ML/MIN/1.73 M^2
EST. GFR  (NON AFRICAN AMERICAN): >60 ML/MIN/1.73 M^2
GLUCOSE SERPL-MCNC: 93 MG/DL (ref 70–110)
HCT VFR BLD AUTO: 42.5 % (ref 40–54)
HGB BLD-MCNC: 14.5 G/DL (ref 14–18)
IMM GRANULOCYTES # BLD AUTO: 0.03 K/UL (ref 0–0.04)
IMM GRANULOCYTES NFR BLD AUTO: 0.4 % (ref 0–0.5)
LYMPHOCYTES # BLD AUTO: 1.2 K/UL (ref 1–4.8)
LYMPHOCYTES NFR BLD: 17.1 % (ref 18–48)
MCH RBC QN AUTO: 30 PG (ref 27–31)
MCHC RBC AUTO-ENTMCNC: 34.1 G/DL (ref 32–36)
MCV RBC AUTO: 88 FL (ref 82–98)
MONOCYTES # BLD AUTO: 0.9 K/UL (ref 0.3–1)
MONOCYTES NFR BLD: 12.5 % (ref 4–15)
NEUTROPHILS # BLD AUTO: 4.7 K/UL (ref 1.8–7.7)
NEUTROPHILS NFR BLD: 67.2 % (ref 38–73)
NRBC BLD-RTO: 0 /100 WBC
PLATELET # BLD AUTO: 302 K/UL (ref 150–450)
PMV BLD AUTO: 11.6 FL (ref 9.2–12.9)
POTASSIUM SERPL-SCNC: 3.9 MMOL/L (ref 3.5–5.1)
PROT SERPL-MCNC: 7 G/DL (ref 6–8.4)
RBC # BLD AUTO: 4.84 M/UL (ref 4.6–6.2)
SODIUM SERPL-SCNC: 136 MMOL/L (ref 136–145)
WBC # BLD AUTO: 7.02 K/UL (ref 3.9–12.7)

## 2022-05-30 PROCEDURE — 3075F PR MOST RECENT SYSTOLIC BLOOD PRESS GE 130-139MM HG: ICD-10-PCS | Mod: CPTII,S$GLB,, | Performed by: STUDENT IN AN ORGANIZED HEALTH CARE EDUCATION/TRAINING PROGRAM

## 2022-05-30 PROCEDURE — 85025 COMPLETE CBC W/AUTO DIFF WBC: CPT | Performed by: STUDENT IN AN ORGANIZED HEALTH CARE EDUCATION/TRAINING PROGRAM

## 2022-05-30 PROCEDURE — 36415 COLL VENOUS BLD VENIPUNCTURE: CPT | Performed by: STUDENT IN AN ORGANIZED HEALTH CARE EDUCATION/TRAINING PROGRAM

## 2022-05-30 PROCEDURE — 77014 PR  CT GUIDANCE PLACEMENT RAD THERAPY FIELDS: ICD-10-PCS | Mod: 26,,, | Performed by: RADIOLOGY

## 2022-05-30 PROCEDURE — 3008F PR BODY MASS INDEX (BMI) DOCUMENTED: ICD-10-PCS | Mod: CPTII,S$GLB,, | Performed by: STUDENT IN AN ORGANIZED HEALTH CARE EDUCATION/TRAINING PROGRAM

## 2022-05-30 PROCEDURE — 99999 PR PBB SHADOW E&M-EST. PATIENT-LVL III: CPT | Mod: PBBFAC,,, | Performed by: STUDENT IN AN ORGANIZED HEALTH CARE EDUCATION/TRAINING PROGRAM

## 2022-05-30 PROCEDURE — 77014 HC CT GUIDANCE RADIATION THERAPY FLDS PLACEMENT: CPT | Mod: TC | Performed by: RADIOLOGY

## 2022-05-30 PROCEDURE — 99215 PR OFFICE/OUTPT VISIT, EST, LEVL V, 40-54 MIN: ICD-10-PCS | Mod: S$GLB,,, | Performed by: STUDENT IN AN ORGANIZED HEALTH CARE EDUCATION/TRAINING PROGRAM

## 2022-05-30 PROCEDURE — 80053 COMPREHEN METABOLIC PANEL: CPT | Performed by: STUDENT IN AN ORGANIZED HEALTH CARE EDUCATION/TRAINING PROGRAM

## 2022-05-30 PROCEDURE — 99999 PR PBB SHADOW E&M-EST. PATIENT-LVL III: ICD-10-PCS | Mod: PBBFAC,,, | Performed by: STUDENT IN AN ORGANIZED HEALTH CARE EDUCATION/TRAINING PROGRAM

## 2022-05-30 PROCEDURE — 99215 OFFICE O/P EST HI 40 MIN: CPT | Mod: S$GLB,,, | Performed by: STUDENT IN AN ORGANIZED HEALTH CARE EDUCATION/TRAINING PROGRAM

## 2022-05-30 PROCEDURE — 3075F SYST BP GE 130 - 139MM HG: CPT | Mod: CPTII,S$GLB,, | Performed by: STUDENT IN AN ORGANIZED HEALTH CARE EDUCATION/TRAINING PROGRAM

## 2022-05-30 PROCEDURE — 77386 HC IMRT, COMPLEX: CPT | Performed by: RADIOLOGY

## 2022-05-30 PROCEDURE — 1159F PR MEDICATION LIST DOCUMENTED IN MEDICAL RECORD: ICD-10-PCS | Mod: CPTII,S$GLB,, | Performed by: STUDENT IN AN ORGANIZED HEALTH CARE EDUCATION/TRAINING PROGRAM

## 2022-05-30 PROCEDURE — 3079F DIAST BP 80-89 MM HG: CPT | Mod: CPTII,S$GLB,, | Performed by: STUDENT IN AN ORGANIZED HEALTH CARE EDUCATION/TRAINING PROGRAM

## 2022-05-30 PROCEDURE — 1159F MED LIST DOCD IN RCRD: CPT | Mod: CPTII,S$GLB,, | Performed by: STUDENT IN AN ORGANIZED HEALTH CARE EDUCATION/TRAINING PROGRAM

## 2022-05-30 PROCEDURE — 3008F BODY MASS INDEX DOCD: CPT | Mod: CPTII,S$GLB,, | Performed by: STUDENT IN AN ORGANIZED HEALTH CARE EDUCATION/TRAINING PROGRAM

## 2022-05-30 PROCEDURE — 77336 RADIATION PHYSICS CONSULT: CPT | Performed by: RADIOLOGY

## 2022-05-30 PROCEDURE — 3079F PR MOST RECENT DIASTOLIC BLOOD PRESSURE 80-89 MM HG: ICD-10-PCS | Mod: CPTII,S$GLB,, | Performed by: STUDENT IN AN ORGANIZED HEALTH CARE EDUCATION/TRAINING PROGRAM

## 2022-05-30 PROCEDURE — 77014 PR  CT GUIDANCE PLACEMENT RAD THERAPY FIELDS: CPT | Mod: 26,,, | Performed by: RADIOLOGY

## 2022-05-30 RX ORDER — SULFAMETHOXAZOLE AND TRIMETHOPRIM 800; 160 MG/1; MG/1
TABLET ORAL
Qty: 3 TABLET | Refills: 0 | Status: SHIPPED | OUTPATIENT
Start: 2022-05-30 | End: 2022-06-21 | Stop reason: ALTCHOICE

## 2022-05-30 NOTE — TELEPHONE ENCOUNTER
Specialty Pharmacy - Refill Coordination    Specialty Medication Orders Linked to Encounter    Flowsheet Row Most Recent Value   Medication #1 temozolomide (TEMODAR) 140 MG capsule (Order#[id number], Rx#[id number])          Refill Questions - Documented Responses    Flowsheet Row Most Recent Value   Patient Availability and HIPAA Verification    Does patient want to proceed with activity? Yes   HIPAA/medical authority confirmed? Yes   Relationship to patient of person spoken to? Self   Refill Screening Questions    Changes to allergies? No   Changes to medications? No   New conditions since last clinic visit? No   Unplanned office visit, urgent care, ED, or hospital admission in the last 4 weeks? No   How does patient/caregiver feel medication is working? Good   Financial problems or insurance changes? No   How many doses of your specialty medications were missed in the last 4 weeks? 0   Would patient like to speak to a pharmacist? No   When does the patient need to receive the medication? 06/03/22   Refill Delivery Questions    How will the patient receive the medication? Delivery Alpa   When does the patient need to receive the medication? 06/03/22   Shipping Address Home   Address in [facility] confirmed and updated if neccessary? Yes   Expected Copay ($) 0   Is the patient able to afford the medication copay? Yes   Payment Method zero copay   Days supply of Refill 14   Supplies needed? No supplies needed   Refill activity completed? Yes   Refill activity plan Refill scheduled   Shipment/Pickup Date: 06/02/22          Current Outpatient Medications   Medication Sig    famotidine (PEPCID) 20 MG tablet Take 1 tablet (20 mg total) by mouth 2 (two) times daily.    indomethacin (INDOCIN) 50 MG capsule Take 1 capsule (50 mg total) by mouth 2 (two) times daily with meals.    levETIRAcetam (KEPPRA) 750 MG Tab Take 1 tablet (750 mg total) by mouth 2 (two) times daily.    ondansetron (ZOFRAN) 8 MG tablet Take 1  tablet (8 mg total) by mouth every 8 (eight) hours as needed for Nausea.    prochlorperazine (COMPAZINE) 10 MG tablet Take 1 tablet (10 mg total) by mouth 3 (three) times daily as needed (use when zofran does not work). (Patient not taking: Reported on 5/24/2022)    sulfamethoxazole-trimethoprim 800-160mg (BACTRIM DS) 800-160 mg Tab Take 1 tablet by mouth on Mondays, Wednesdays, and Fridays.    temozolomide (TEMODAR) 140 MG capsule Take 1 capsule (140 mg total) by mouth once daily Take as directed days 1-42 (6 weeks). Take on an empty stomach..   Last reviewed on 5/24/2022  9:33 AM by Dedra Sanders MA    Review of patient's allergies indicates:  No Known Allergies Last reviewed on  5/24/2022 9:32 AM by Dedra Sanders      Tasks added this encounter   6/10/2022 - Refill Call (Auto Added)   Tasks due within next 3 months   No tasks due.     Beverley Lowry, Patient Care Assistant  [facility]  [address]  [facility] [address]  Phone: [phone]  Fax: [phone]

## 2022-05-31 PROBLEM — D64.9 NORMOCYTIC ANEMIA: Status: RESOLVED | Noted: 2022-03-08 | Resolved: 2022-05-31

## 2022-05-31 PROCEDURE — 77014 PR  CT GUIDANCE PLACEMENT RAD THERAPY FIELDS: ICD-10-PCS | Mod: 26,,, | Performed by: RADIOLOGY

## 2022-05-31 PROCEDURE — 77386 HC IMRT, COMPLEX: CPT | Performed by: RADIOLOGY

## 2022-05-31 PROCEDURE — 77014 PR  CT GUIDANCE PLACEMENT RAD THERAPY FIELDS: CPT | Mod: 26,,, | Performed by: RADIOLOGY

## 2022-05-31 PROCEDURE — 77014 HC CT GUIDANCE RADIATION THERAPY FLDS PLACEMENT: CPT | Mod: TC | Performed by: RADIOLOGY

## 2022-05-31 NOTE — PROGRESS NOTES
PATIENT: Дмитрий Winn  MRN: [id number]  DATE: 5/31/2022      Diagnosis:   1. Malignant astrocytoma of frontal lobe    2. Astrocytoma brain tumor    3. Normocytic anemia    4. Immunodeficiency due to drug therapy    5. Elevated BP without diagnosis of hypertension    6. Localization-related (focal) (partial) symptomatic epilepsy and epileptic syndromes with complex partial seizures, not intractable, without status epilepticus        Chief Complaint: Malignant astrocytoma of frontal lobe      Oncologic History:    Oncologic History 1. Astrocytoma, IDH mutated    Oncologic Treatment 1. 8/2018 STR  2. 2/17/22 STR  3. 5/9/22 chemoradiation with temozolomide    Pathology 1. 8/10/2018. Pathology: Diffuse Astrocytoma IDH1 and ATRX mutated. P53 over expressed, WHO Grade II  Genomic alterations include loss of 2q37.1q37.3, gain of 7q22.1q36.3, gain of  11q23.3q24.1, copy neutral loss of heterozygosity (cnLOH) of 17p13.3p11.1 (including TP53), gain of 18p11.32p11.21, gain of 18q21.2q23, and gain of 19p13.3p11 (including PTBP1).          Subjective:    Interval History: Mr. Winn is here for follow up.  He is a former patient of Dr. Coronado.    36 year old man with history of epilepsy and initial diagnosis of astrocytoma, WHO grade II s/p STR 8/2018.  When he was first diagnosed, he presented with a seizure to [facility] 8/2018.  He was followed with surveillance with recent MRI 1/29/22 showing interval growth of the right frontal lesion with mild increased mass effect; patient at his follow up visit with Dr. Van was asymptomatic at the time.  Decision was made to perform craniotomy.  2/17/22 he had a STR and final pathology showed WHO grade II astrocytoma, IDH mutated. 5/9/22 he started chemoradiation.  ---  He continues to do well overall.  He has noticed he's a little more tired after radiation but otherwise no new symptoms to report. Denies new focal neuro deficits.  Jokingly says his wife thinks  his memory is affected, but he hasn't noticed anything specific.  He continues to work full-time without difficulty. Denies any recurrent seizures.    He currently works as a del real. He lives on the [place].      He is alone at this visit today.    Past Medical History:   Past Medical History:   Diagnosis Date    Ataxia 5/29/2020    Cancer     brain tumor    Epilepsy     Normocytic anemia 3/8/2022       Past Surgical HIstory:   Past Surgical History:   Procedure Laterality Date    CRANIOTOMY FOR EXCISION OF INTRACRANIAL TUMOR Right 8/10/2018    Procedure: CRANIOTOMY, FOR INTRACRANIAL NEOPLASM EXCISION Right eyebrow incision / LAY[facility] craniotomy for tumor resection;  Surgeon: Lefty Paulson MD;  [facility] OR;  Service: Neurosurgery;  Laterality: Right;    CRANIOTOMY USING FRAMELESS STEREOTAXY Right 2/17/2022    Procedure: CRANIOTOMY, USING FRAMELESS STEREOTAXY;  Surgeon: Chago Van MD;  Location: [facility] OR [address] [place] [address];  Service: Neurosurgery;  Laterality: Right;    cyst removed      SURGICAL REMOVAL OF PILONIDAL CYST         Family History:   Family History   Problem Relation Age of Onset    Cancer Maternal Grandfather     Prostate cancer Maternal Grandfather     Cancer Paternal Grandmother     Breast cancer Paternal Grandmother     Hypertension Mother     No Known Problems Sister     No Known Problems Brother        Social History:  reports that he quit smoking about 3 years ago. His smoking use included cigarettes. He started smoking about 12 years ago. He has a 8.00 pack-year smoking history. He has never used smokeless tobacco. He reports current alcohol use of about 42.0 standard drinks of alcohol per week. He reports that he does not use drugs.    Allergies:  Review of patient's allergies indicates:  No Known Allergies    Medications:  Current Outpatient Medications   Medication Sig Dispense Refill    famotidine (PEPCID) 20 MG tablet Take 1 tablet (20 mg total) by mouth 2  (two) times daily. 60 tablet 0    indomethacin (INDOCIN) 50 MG capsule Take 1 capsule (50 mg total) by mouth 2 (two) times daily with meals. 90 capsule 0    levETIRAcetam (KEPPRA) 750 MG Tab Take 1 tablet (750 mg total) by mouth 2 (two) times daily. 60 tablet 11    ondansetron (ZOFRAN) 8 MG tablet Take 1 tablet (8 mg total) by mouth every 8 (eight) hours as needed for Nausea. 30 tablet 3    prochlorperazine (COMPAZINE) 10 MG tablet Take 1 tablet (10 mg total) by mouth 3 (three) times daily as needed (use when zofran does not work). (Patient not taking: Reported on 5/24/2022) 30 tablet 3    sulfamethoxazole-trimethoprim 800-160mg (BACTRIM DS) 800-160 mg Tab Take 1 tablet by mouth on Mondays, Wednesdays, and Fridays. 3 tablet 0    temozolomide (TEMODAR) 140 MG capsule Take 1 capsule (140 mg total) by mouth once daily Take as directed days 1-42 (6 weeks). Take on an empty stomach.. 42 capsule 0     No current facility-administered medications for this visit.       Review of Systems   Constitutional: Positive for fatigue. Negative for activity change, appetite change, chills, diaphoresis, fever and unexpected weight change.   HENT: Negative for nosebleeds and trouble swallowing.    Eyes: Negative for visual disturbance.   Respiratory: Negative for cough, chest tightness, shortness of breath and wheezing.    Cardiovascular: Negative for chest pain and leg swelling.   Gastrointestinal: Negative for abdominal distention, abdominal pain, blood in stool, constipation, diarrhea, nausea and vomiting.   Endocrine: Negative for cold intolerance and heat intolerance.   Genitourinary: Negative for difficulty urinating and dysuria.   Musculoskeletal: Negative for arthralgias and back pain.   Skin: Negative for color change.   Neurological: Negative for dizziness, seizures, weakness, light-headedness, numbness and headaches.   Hematological: Negative for adenopathy. Does not bruise/bleed easily.   Psychiatric/Behavioral:  "Negative for confusion and sleep disturbance.       ECOG Performance Status:     ECOG SCORE    0 - Fully active-able to carry on all pre-disease performance without restriction         Objective:      Vitals:   Vitals:    05/30/22 1517   BP: 130/86   BP Location: Left arm   Patient Position: Sitting   BP Method: Large (Automatic)   Pulse: (!) 57   Resp: 18   Temp: 98.1 °F (36.7 °C)   TempSrc: Oral   SpO2: 100%   Weight: 72.8 kg (160 lb 7.9 oz)   Height: 5' 6" (1.676 m)     BMI: Body mass index is 25.9 kg/m².       Physical Exam  Constitutional:       General: He is not in acute distress.     Appearance: Normal appearance. He is not ill-appearing.   HENT:      Head: Normocephalic.      Nose: Nose normal.      Mouth/Throat:      Pharynx: No oropharyngeal exudate or posterior oropharyngeal erythema.   Eyes:      General: No scleral icterus.     Extraocular Movements: Extraocular movements intact.      Conjunctiva/sclera: Conjunctivae normal.      Pupils: Pupils are equal, round, and reactive to light.   Cardiovascular:      Rate and Rhythm: Normal rate and regular rhythm.      Heart sounds: No murmur heard.    No friction rub. No gallop.   Pulmonary:      Effort: Pulmonary effort is normal. No respiratory distress.      Breath sounds: No stridor. No wheezing, rhonchi or rales.   Abdominal:      General: Bowel sounds are normal. There is no distension.      Palpations: Abdomen is soft. There is no mass.      Tenderness: There is no abdominal tenderness. There is no guarding or rebound.   Musculoskeletal:         General: Normal range of motion.      Cervical back: Normal range of motion and neck supple.      Right lower leg: No edema.      Left lower leg: No edema.   Skin:     General: Skin is warm and dry.   Neurological:      General: No focal deficit present.      Mental Status: He is alert.         Laboratory Data:   Recent Results (from the past 168 hour(s))   Comprehensive Metabolic Panel    Collection Time: " 05/30/22  1:47 PM   Result Value Ref Range    Sodium 136 136 - 145 mmol/L    Potassium 3.9 3.5 - 5.1 mmol/L    Chloride 103 95 - 110 mmol/L    CO2 23 23 - 29 mmol/L    Glucose 93 70 - 110 mg/dL    BUN 11 6 - 20 mg/dL    Creatinine 1.1 0.5 - 1.4 mg/dL    Calcium 9.3 8.7 - 10.5 mg/dL    Total Protein 7.0 6.0 - 8.4 g/dL    Albumin 4.2 3.5 - 5.2 g/dL    Total Bilirubin 0.5 0.1 - 1.0 mg/dL    Alkaline Phosphatase 80 55 - 135 U/L    AST 26 10 - 40 U/L    ALT 37 10 - 44 U/L    Anion Gap 10 8 - 16 mmol/L    eGFR if African American >60.0 >60 mL/min/1.73 m^2    eGFR if non African American >60.0 >60 mL/min/1.73 m^2   CBC Auto Differential    Collection Time: 05/30/22  1:47 PM   Result Value Ref Range    WBC 7.02 3.90 - 12.70 K/uL    RBC 4.84 4.60 - 6.20 M/uL    Hemoglobin 14.5 14.0 - 18.0 g/dL    Hematocrit 42.5 40.0 - 54.0 %    MCV 88 82 - 98 fL    MCH 30.0 27.0 - 31.0 pg    MCHC 34.1 32.0 - 36.0 g/dL    RDW 12.6 11.5 - 14.5 %    Platelets 302 150 - 450 K/uL    MPV 11.6 9.2 - 12.9 fL    Immature Granulocytes 0.4 0.0 - 0.5 %    Gran # (ANC) 4.7 1.8 - 7.7 K/uL    Immature Grans (Abs) 0.03 0.00 - 0.04 K/uL    Lymph # 1.2 1.0 - 4.8 K/uL    Mono # 0.9 0.3 - 1.0 K/uL    Eos # 0.2 0.0 - 0.5 K/uL    Baso # 0.05 0.00 - 0.20 K/uL    nRBC 0 0 /100 WBC    Gran % 67.2 38.0 - 73.0 %    Lymph % 17.1 (L) 18.0 - 48.0 %    Mono % 12.5 4.0 - 15.0 %    Eosinophil % 2.1 0.0 - 8.0 %    Basophil % 0.7 0.0 - 1.9 %    Differential Method Automated          Imaging:     MRI Brain W WO Contrast    Result Date: 4/28/2022  EXAMINATION: MRI BRAIN W WO CONTRAST CLINICAL HISTORY: Low grade glioma; Malignant neoplasm of frontal lobe TECHNIQUE: Multiplanar multisequence MR imaging of the brain was performed before and after the administration of 8 mL Gadavist intravenous contrast. COMPARISON: MRI brain 02/17/2022 [id number], 04/30/2021, 04/20/2021. CT head 05/29/2020. FINDINGS: Postoperative changes of right frontal craniotomy for right frontal lesion  resection.  Interval resolution of postoperative gas and layering hemorrhage within the resection cavity.  There is a thin area of enhancement surrounding the resection margins without nodular contour with some component of diffusion restriction, overall similar to prior, likely post-treatment change.  Previous T2 FLAIR hyperintensity along the posterolateral aspect of the resection cavity is reduced there is marginated T2 flair signal hyperintensity along the ventral aspect of the resection cavity as well as smaller component along the dorsal aspect the resection cavity concerning for components of residual nonenhancing lesion previously felt to represent postoperative fluid with reduced mass effect from prior.  This is most pronounced ventrally with component measuring approximately a 0.7 x 3.2 cm in AP by TV dimensions. There is slight marginated diffusion abnormality about the resection cavity felt to represent artifact from postoperative hemorrhage.  There is no restricted diffusion elsewhere to suggest acute or recent infarction.  Component of T2 flair signal hyperintensity along the margin resection cavity felt to represent gliosis and scarring.  There is reduced mass effect related to resection cavity with resolution of previous midline shift and re-expansion right frontal horn lateral ventricle.  No evidence for current hydrocephalus. Again noted incidental probable punctate developmental venous anomaly within the right inferior frontal lobe, similar to prior. The T2 skull base flow voids are preserved.  Mild opacification posterior left ethmoidal air cells. This report was flagged in Epic as abnormal.     Evolving operative change from right frontal lesion resection via right frontal craniotomy. Slightly more apparent marginated T2 flair hyperintensity along the ventral aspect the resection cavity felt to represent lesion residual and to lesser degree dorsal aspect resection cavity.  There is no  significant new parenchymal signal abnormality to suggest lesional worsening with study to represent new baseline with resolution of previous postoperative gas and hemorrhage within the resection cavity as well as reduced edema. Thin marginated enhancement about the resection cavity felt to be postoperative no evidence for nodular enhancement. Clinical correlation and continued follow-up advised. Electronically signed by resident: Chris Carvajal Date:    04/28/2022 Time:    08:27 Electronically signed by: Duc Lynch DO Date:    04/28/2022 Time:    09:41      Assessment:       1. Malignant astrocytoma of frontal lobe    2. Astrocytoma brain tumor    3. Normocytic anemia    4. Immunodeficiency due to drug therapy    5. Elevated BP without diagnosis of hypertension    6. Localization-related (focal) (partial) symptomatic epilepsy and epileptic syndromes with complex partial seizures, not intractable, without status epilepticus           Plan:       Problem List Items Addressed This Visit        Neuro    Localization-related (focal) (partial) symptomatic epilepsy and epileptic syndromes with complex partial seizures, not intractable, without status epilepticus    Current Assessment & Plan     No recurrent seizures  -continue keppra              Cardiac/Vascular    Elevated BP without diagnosis of hypertension    Current Assessment & Plan     Gradually improving; may be secondary to prior etoh intake.              Immunology/Multi System    Immunodeficiency due to drug therapy    Current Assessment & Plan     Afebrile, ANC sufficient for treatment  -monitor cbc  -bactrim ppx while receiving radiation              Oncology    Malignant astrocytoma of frontal lobe - Primary    Overview     2/17/22 STR for recurrent WHO grade II astrocytoma, IDH mutated.  5/9/22 Started chemoradiation with temozolomide           Current Assessment & Plan     He continues to do well overall. Feeling a little tired after radiation. No new  chemo side effects to report.    -weekly labs  -follow up after he completes radiation.  -supportive meds: continue zofran 1 hour prior to TMZ           Relevant Medications    sulfamethoxazole-trimethoprim 800-160mg (BACTRIM DS) 800-160 mg Tab    RESOLVED: Normocytic anemia    Current Assessment & Plan     resolved           Astrocytoma brain tumor    Relevant Medications    sulfamethoxazole-trimethoprim 800-160mg (BACTRIM DS) 800-160 mg Tab          No orders of the defined types were placed in this encounter.    Route Chart for Scheduling    Med Onc Chart Routing      Follow up with physician Other. Follow up with me 6/21 after he sees Dr. Van   Follow up with DARA    Labs CMP and CBC   Lab interval:  Already scheduled   Imaging None      Pharmacy appointment No pharmacy appointment needed      Other referrals No additional referrals needed         Treatment Plan Information   OP TEMOZOLOMIDE DAILY (6 WEEKS) WITH CONCURRENT RADIATION   Sha Pan MD   Upcoming Treatment Dates - OP TEMOZOLOMIDE DAILY (6 WEEKS) WITH CONCURRENT RADIATION    No upcoming days in selected categories.          Sha Pan MD  Hematology Oncology

## 2022-05-31 NOTE — ASSESSMENT & PLAN NOTE
He continues to do well overall. Feeling a little tired after radiation. No new chemo side effects to report.    -weekly labs  -follow up after he completes radiation.  -supportive meds: continue zofran 1 hour prior to TMZ

## 2022-06-01 ENCOUNTER — HOSPITAL ENCOUNTER (OUTPATIENT)
Dept: RADIATION THERAPY | Facility: HOSPITAL | Age: 37
Discharge: HOME | End: 2022-06-01
Attending: RADIOLOGY
Payer: COMMERCIAL

## 2022-06-01 PROCEDURE — 77014 HC CT GUIDANCE RADIATION THERAPY FLDS PLACEMENT: CPT | Mod: TC | Performed by: RADIOLOGY

## 2022-06-01 PROCEDURE — 77014 PR  CT GUIDANCE PLACEMENT RAD THERAPY FIELDS: CPT | Mod: 26,,, | Performed by: RADIOLOGY

## 2022-06-01 PROCEDURE — 77014 PR  CT GUIDANCE PLACEMENT RAD THERAPY FIELDS: ICD-10-PCS | Mod: 26,,, | Performed by: RADIOLOGY

## 2022-06-01 PROCEDURE — 77386 HC IMRT, COMPLEX: CPT | Performed by: RADIOLOGY

## 2022-06-02 ENCOUNTER — DOCUMENTATION ONLY (OUTPATIENT)
Dept: RADIATION ONCOLOGY | Facility: CLINIC | Age: 37
End: 2022-06-02
Payer: COMMERCIAL

## 2022-06-02 PROCEDURE — 77014 PR  CT GUIDANCE PLACEMENT RAD THERAPY FIELDS: ICD-10-PCS | Mod: 26,,, | Performed by: RADIOLOGY

## 2022-06-02 PROCEDURE — 77014 PR  CT GUIDANCE PLACEMENT RAD THERAPY FIELDS: CPT | Mod: 26,,, | Performed by: RADIOLOGY

## 2022-06-02 PROCEDURE — 77386 HC IMRT, COMPLEX: CPT | Performed by: RADIOLOGY

## 2022-06-02 PROCEDURE — 77014 HC CT GUIDANCE RADIATION THERAPY FLDS PLACEMENT: CPT | Mod: TC | Performed by: RADIOLOGY

## 2022-06-03 PROCEDURE — 77014 HC CT GUIDANCE RADIATION THERAPY FLDS PLACEMENT: CPT | Mod: TC | Performed by: RADIOLOGY

## 2022-06-03 PROCEDURE — 77014 PR  CT GUIDANCE PLACEMENT RAD THERAPY FIELDS: ICD-10-PCS | Mod: 26,,, | Performed by: RADIOLOGY

## 2022-06-03 PROCEDURE — 77386 HC IMRT, COMPLEX: CPT | Performed by: RADIOLOGY

## 2022-06-03 PROCEDURE — 77014 PR  CT GUIDANCE PLACEMENT RAD THERAPY FIELDS: CPT | Mod: 26,,, | Performed by: RADIOLOGY

## 2022-06-06 ENCOUNTER — LAB VISIT (OUTPATIENT)
Dept: LAB | Facility: HOSPITAL | Age: 37
End: 2022-06-06
Attending: STUDENT IN AN ORGANIZED HEALTH CARE EDUCATION/TRAINING PROGRAM
Payer: COMMERCIAL

## 2022-06-06 DIAGNOSIS — C71.9 ASTROCYTOMA BRAIN TUMOR: ICD-10-CM

## 2022-06-06 LAB
ALBUMIN SERPL BCP-MCNC: 4.2 G/DL (ref 3.5–5.2)
ALP SERPL-CCNC: 68 U/L (ref 55–135)
ALT SERPL W/O P-5'-P-CCNC: 31 U/L (ref 10–44)
ANION GAP SERPL CALC-SCNC: 10 MMOL/L (ref 8–16)
AST SERPL-CCNC: 24 U/L (ref 10–40)
BASOPHILS # BLD AUTO: 0.05 K/UL (ref 0–0.2)
BASOPHILS NFR BLD: 0.9 % (ref 0–1.9)
BILIRUB SERPL-MCNC: 0.6 MG/DL (ref 0.1–1)
BUN SERPL-MCNC: 9 MG/DL (ref 6–20)
CALCIUM SERPL-MCNC: 9.1 MG/DL (ref 8.7–10.5)
CHLORIDE SERPL-SCNC: 104 MMOL/L (ref 95–110)
CO2 SERPL-SCNC: 22 MMOL/L (ref 23–29)
CREAT SERPL-MCNC: 1 MG/DL (ref 0.5–1.4)
DIFFERENTIAL METHOD: ABNORMAL
EOSINOPHIL # BLD AUTO: 0.2 K/UL (ref 0–0.5)
EOSINOPHIL NFR BLD: 2.7 % (ref 0–8)
ERYTHROCYTE [DISTWIDTH] IN BLOOD BY AUTOMATED COUNT: 12.6 % (ref 11.5–14.5)
EST. GFR  (AFRICAN AMERICAN): >60 ML/MIN/1.73 M^2
EST. GFR  (NON AFRICAN AMERICAN): >60 ML/MIN/1.73 M^2
GLUCOSE SERPL-MCNC: 93 MG/DL (ref 70–110)
HCT VFR BLD AUTO: 42.6 % (ref 40–54)
HGB BLD-MCNC: 14.8 G/DL (ref 14–18)
IMM GRANULOCYTES # BLD AUTO: 0.03 K/UL (ref 0–0.04)
IMM GRANULOCYTES NFR BLD AUTO: 0.5 % (ref 0–0.5)
LYMPHOCYTES # BLD AUTO: 0.8 K/UL (ref 1–4.8)
LYMPHOCYTES NFR BLD: 14.7 % (ref 18–48)
MCH RBC QN AUTO: 29.5 PG (ref 27–31)
MCHC RBC AUTO-ENTMCNC: 34.7 G/DL (ref 32–36)
MCV RBC AUTO: 85 FL (ref 82–98)
MONOCYTES # BLD AUTO: 0.7 K/UL (ref 0.3–1)
MONOCYTES NFR BLD: 12.7 % (ref 4–15)
NEUTROPHILS # BLD AUTO: 3.8 K/UL (ref 1.8–7.7)
NEUTROPHILS NFR BLD: 68.5 % (ref 38–73)
NRBC BLD-RTO: 0 /100 WBC
PLATELET # BLD AUTO: 254 K/UL (ref 150–450)
PMV BLD AUTO: 11.7 FL (ref 9.2–12.9)
POTASSIUM SERPL-SCNC: 4.3 MMOL/L (ref 3.5–5.1)
PROT SERPL-MCNC: 6.9 G/DL (ref 6–8.4)
RBC # BLD AUTO: 5.02 M/UL (ref 4.6–6.2)
SODIUM SERPL-SCNC: 136 MMOL/L (ref 136–145)
WBC # BLD AUTO: 5.59 K/UL (ref 3.9–12.7)

## 2022-06-06 PROCEDURE — 77014 PR  CT GUIDANCE PLACEMENT RAD THERAPY FIELDS: CPT | Mod: 26,,, | Performed by: RADIOLOGY

## 2022-06-06 PROCEDURE — 36415 COLL VENOUS BLD VENIPUNCTURE: CPT | Performed by: STUDENT IN AN ORGANIZED HEALTH CARE EDUCATION/TRAINING PROGRAM

## 2022-06-06 PROCEDURE — 77336 RADIATION PHYSICS CONSULT: CPT | Performed by: RADIOLOGY

## 2022-06-06 PROCEDURE — 77014 HC CT GUIDANCE RADIATION THERAPY FLDS PLACEMENT: CPT | Mod: TC | Performed by: RADIOLOGY

## 2022-06-06 PROCEDURE — 80053 COMPREHEN METABOLIC PANEL: CPT | Performed by: STUDENT IN AN ORGANIZED HEALTH CARE EDUCATION/TRAINING PROGRAM

## 2022-06-06 PROCEDURE — 77386 HC IMRT, COMPLEX: CPT | Performed by: RADIOLOGY

## 2022-06-06 PROCEDURE — 77014 PR  CT GUIDANCE PLACEMENT RAD THERAPY FIELDS: ICD-10-PCS | Mod: 26,,, | Performed by: RADIOLOGY

## 2022-06-06 PROCEDURE — 85025 COMPLETE CBC W/AUTO DIFF WBC: CPT | Performed by: STUDENT IN AN ORGANIZED HEALTH CARE EDUCATION/TRAINING PROGRAM

## 2022-06-07 PROCEDURE — 77386 HC IMRT, COMPLEX: CPT | Performed by: RADIOLOGY

## 2022-06-07 PROCEDURE — 77014 HC CT GUIDANCE RADIATION THERAPY FLDS PLACEMENT: CPT | Mod: TC | Performed by: RADIOLOGY

## 2022-06-07 PROCEDURE — 77014 PR  CT GUIDANCE PLACEMENT RAD THERAPY FIELDS: CPT | Mod: 26,,, | Performed by: RADIOLOGY

## 2022-06-07 PROCEDURE — 77014 PR  CT GUIDANCE PLACEMENT RAD THERAPY FIELDS: ICD-10-PCS | Mod: 26,,, | Performed by: RADIOLOGY

## 2022-06-08 PROCEDURE — 77014 PR  CT GUIDANCE PLACEMENT RAD THERAPY FIELDS: CPT | Mod: 26,,, | Performed by: RADIOLOGY

## 2022-06-08 PROCEDURE — 77386 HC IMRT, COMPLEX: CPT | Performed by: RADIOLOGY

## 2022-06-08 PROCEDURE — 77014 PR  CT GUIDANCE PLACEMENT RAD THERAPY FIELDS: ICD-10-PCS | Mod: 26,,, | Performed by: RADIOLOGY

## 2022-06-08 PROCEDURE — 77014 HC CT GUIDANCE RADIATION THERAPY FLDS PLACEMENT: CPT | Mod: TC | Performed by: RADIOLOGY

## 2022-06-09 ENCOUNTER — DOCUMENTATION ONLY (OUTPATIENT)
Dept: RADIATION ONCOLOGY | Facility: CLINIC | Age: 37
End: 2022-06-09
Payer: COMMERCIAL

## 2022-06-09 PROCEDURE — 77386 HC IMRT, COMPLEX: CPT | Performed by: RADIOLOGY

## 2022-06-09 PROCEDURE — 77014 HC CT GUIDANCE RADIATION THERAPY FLDS PLACEMENT: CPT | Mod: TC | Performed by: RADIOLOGY

## 2022-06-09 PROCEDURE — 77014 PR  CT GUIDANCE PLACEMENT RAD THERAPY FIELDS: ICD-10-PCS | Mod: 26,,, | Performed by: RADIOLOGY

## 2022-06-09 PROCEDURE — 77014 PR  CT GUIDANCE PLACEMENT RAD THERAPY FIELDS: CPT | Mod: 26,,, | Performed by: RADIOLOGY

## 2022-06-09 NOTE — PLAN OF CARE
Day 23 of outpatient radiation to the brain scalp irritation noted within field of treatment  instructed to use hydrocortisone

## 2022-06-10 ENCOUNTER — SPECIALTY PHARMACY (OUTPATIENT)
Dept: PHARMACY | Facility: CLINIC | Age: 37
End: 2022-06-10
Payer: COMMERCIAL

## 2022-06-10 ENCOUNTER — PATIENT MESSAGE (OUTPATIENT)
Dept: NEUROSURGERY | Facility: CLINIC | Age: 37
End: 2022-06-10
Payer: COMMERCIAL

## 2022-06-10 DIAGNOSIS — C71.1 MALIGNANT ASTROCYTOMA OF FRONTAL LOBE: ICD-10-CM

## 2022-06-10 DIAGNOSIS — C71.1 MALIGNANT ASTROCYTOMA OF FRONTAL LOBE: Primary | ICD-10-CM

## 2022-06-10 PROCEDURE — 77014 HC CT GUIDANCE RADIATION THERAPY FLDS PLACEMENT: CPT | Mod: TC | Performed by: RADIOLOGY

## 2022-06-10 PROCEDURE — 77014 PR  CT GUIDANCE PLACEMENT RAD THERAPY FIELDS: ICD-10-PCS | Mod: 26,,, | Performed by: RADIOLOGY

## 2022-06-10 PROCEDURE — 77386 HC IMRT, COMPLEX: CPT | Performed by: RADIOLOGY

## 2022-06-10 PROCEDURE — 77014 PR  CT GUIDANCE PLACEMENT RAD THERAPY FIELDS: CPT | Mod: 26,,, | Performed by: RADIOLOGY

## 2022-06-10 RX ORDER — TEMOZOLOMIDE 5 MG/1
5 CAPSULE ORAL DAILY
Qty: 5 CAPSULE | Refills: 11 | Status: SHIPPED | OUTPATIENT
Start: 2022-06-10 | End: 2022-06-10 | Stop reason: SDUPTHER

## 2022-06-10 RX ORDER — TEMOZOLOMIDE 20 MG/1
20 CAPSULE ORAL DAILY
Qty: 5 CAPSULE | Refills: 11 | Status: SHIPPED | OUTPATIENT
Start: 2022-06-10 | End: 2022-09-27

## 2022-06-10 RX ORDER — TEMOZOLOMIDE 20 MG/1
20 CAPSULE ORAL DAILY
Qty: 5 CAPSULE | Refills: 11 | Status: SHIPPED | OUTPATIENT
Start: 2022-06-10 | End: 2022-06-10 | Stop reason: SDUPTHER

## 2022-06-10 RX ORDER — TEMOZOLOMIDE 5 MG/1
5 CAPSULE ORAL DAILY
Qty: 5 CAPSULE | Refills: 11 | Status: SHIPPED | OUTPATIENT
Start: 2022-06-10 | End: 2022-09-27

## 2022-06-10 RX ORDER — TEMOZOLOMIDE 250 MG/1
250 CAPSULE ORAL DAILY
Qty: 5 CAPSULE | Refills: 11 | Status: SHIPPED | OUTPATIENT
Start: 2022-06-10 | End: 2022-06-10 | Stop reason: SDUPTHER

## 2022-06-10 RX ORDER — TEMOZOLOMIDE 140 MG/1
140 CAPSULE ORAL DAILY
Qty: 42 CAPSULE | Refills: 0 | OUTPATIENT
Start: 2022-06-10 | End: 2022-07-22

## 2022-06-10 RX ORDER — TEMOZOLOMIDE 140 MG/1
140 CAPSULE ORAL DAILY
Qty: 42 CAPSULE | Refills: 0 | Status: CANCELLED | OUTPATIENT
Start: 2022-06-10 | End: 2022-07-22

## 2022-06-10 RX ORDER — TEMOZOLOMIDE 250 MG/1
250 CAPSULE ORAL DAILY
Qty: 5 CAPSULE | Refills: 11 | Status: SHIPPED | OUTPATIENT
Start: 2022-06-10 | End: 2022-09-27

## 2022-06-10 NOTE — TELEPHONE ENCOUNTER
Outgoing call regarding Temodar. Called to get onhand count and to inform pt refill request has been sent to [facility] for approval. Will follow up with pt on 6/13/22.

## 2022-06-10 NOTE — TELEPHONE ENCOUNTER
New temozolomide rx's (3 total) received with new dosing of 275 mg on days 1-5. Intervention opened for pharmacist to  prior to refill.

## 2022-06-10 NOTE — PLAN OF CARE
DISCONTINUE OFF PATHWAY REGIMEN - Neuro            Temozolomide (Temodar)           Additional Orders: PJP prophylaxis is required for all patients. See PI   for details. Temozolomide is given continually during radiation therapy (RT)   including on weekend days. Administer 1 hour prior to radiation on days with RT.    **Always confirm dose/schedule in your pharmacy ordering system**    REASON: Other Reason  PRIOR TREATMENT:   TREATMENT RESPONSE: N/A - Adjuvant Therapy    START ON PATHWAY REGIMEN - Neuro    [id number]        Temozolomide (Temodar)       Temozolomide (Temodar)           Additional Orders: In studies, patients progressed to 200 mg/m2 dose in   absence of grade 3 or 4 hematologic toxicity.    **Always confirm dose/schedule in your pharmacy ordering system**    Patient Characteristics:  Grade 2 Astrocytoma, IDH-mutant/Oligodendroglioma, IDH-mutant and   1p/19q-codeleted, Recurrent or Progressive, Adjuvant Therapy, Systemic Therapy   Candidate, BRAF V600E Mutation Negative/Unknown and NTRK Fusion Negative/Unknown  Disease Classification: Glioma  Disease Classification: Grade 2 Astrocytoma, IDH-mutant/Oligodendroglioma,   IDH-mutant and 1p/19q-codeleted  Disease Status: Recurrent or Progressive  Treatment Classification: Adjuvant Therapy  Treatment (Nonsurgical/Adjuvant): Systemic Therapy Candidate  NTRK Gene Fusion Status: Negative  BRAF V600E Mutation Status: Negative  Intent of Therapy:  Non-Curative / Palliative Intent, Discussed with Patient

## 2022-06-13 ENCOUNTER — PATIENT MESSAGE (OUTPATIENT)
Dept: PHARMACY | Facility: CLINIC | Age: 37
End: 2022-06-13
Payer: COMMERCIAL

## 2022-06-13 ENCOUNTER — LAB VISIT (OUTPATIENT)
Dept: LAB | Facility: HOSPITAL | Age: 37
End: 2022-06-13
Attending: STUDENT IN AN ORGANIZED HEALTH CARE EDUCATION/TRAINING PROGRAM
Payer: COMMERCIAL

## 2022-06-13 DIAGNOSIS — C71.9 ASTROCYTOMA BRAIN TUMOR: ICD-10-CM

## 2022-06-13 LAB
ALBUMIN SERPL BCP-MCNC: 4.1 G/DL (ref 3.5–5.2)
ALP SERPL-CCNC: 63 U/L (ref 55–135)
ALT SERPL W/O P-5'-P-CCNC: 28 U/L (ref 10–44)
ANION GAP SERPL CALC-SCNC: 11 MMOL/L (ref 8–16)
AST SERPL-CCNC: 21 U/L (ref 10–40)
BASOPHILS # BLD AUTO: 0.04 K/UL (ref 0–0.2)
BASOPHILS NFR BLD: 0.8 % (ref 0–1.9)
BILIRUB SERPL-MCNC: 0.7 MG/DL (ref 0.1–1)
BUN SERPL-MCNC: 9 MG/DL (ref 6–20)
CALCIUM SERPL-MCNC: 9.4 MG/DL (ref 8.7–10.5)
CHLORIDE SERPL-SCNC: 103 MMOL/L (ref 95–110)
CO2 SERPL-SCNC: 21 MMOL/L (ref 23–29)
CREAT SERPL-MCNC: 0.9 MG/DL (ref 0.5–1.4)
DIFFERENTIAL METHOD: ABNORMAL
EOSINOPHIL # BLD AUTO: 0.2 K/UL (ref 0–0.5)
EOSINOPHIL NFR BLD: 3.2 % (ref 0–8)
ERYTHROCYTE [DISTWIDTH] IN BLOOD BY AUTOMATED COUNT: 12.4 % (ref 11.5–14.5)
EST. GFR  (AFRICAN AMERICAN): >60 ML/MIN/1.73 M^2
EST. GFR  (NON AFRICAN AMERICAN): >60 ML/MIN/1.73 M^2
GLUCOSE SERPL-MCNC: 96 MG/DL (ref 70–110)
HCT VFR BLD AUTO: 42.8 % (ref 40–54)
HGB BLD-MCNC: 14.5 G/DL (ref 14–18)
IMM GRANULOCYTES # BLD AUTO: 0.01 K/UL (ref 0–0.04)
IMM GRANULOCYTES NFR BLD AUTO: 0.2 % (ref 0–0.5)
LYMPHOCYTES # BLD AUTO: 0.6 K/UL (ref 1–4.8)
LYMPHOCYTES NFR BLD: 12.7 % (ref 18–48)
MCH RBC QN AUTO: 30.3 PG (ref 27–31)
MCHC RBC AUTO-ENTMCNC: 33.9 G/DL (ref 32–36)
MCV RBC AUTO: 90 FL (ref 82–98)
MONOCYTES # BLD AUTO: 0.8 K/UL (ref 0.3–1)
MONOCYTES NFR BLD: 15.3 % (ref 4–15)
NEUTROPHILS # BLD AUTO: 3.4 K/UL (ref 1.8–7.7)
NEUTROPHILS NFR BLD: 67.8 % (ref 38–73)
NRBC BLD-RTO: 0 /100 WBC
PLATELET # BLD AUTO: 206 K/UL (ref 150–450)
PMV BLD AUTO: 12.6 FL (ref 9.2–12.9)
POTASSIUM SERPL-SCNC: 4 MMOL/L (ref 3.5–5.1)
PROT SERPL-MCNC: 6.7 G/DL (ref 6–8.4)
RBC # BLD AUTO: 4.78 M/UL (ref 4.6–6.2)
SODIUM SERPL-SCNC: 135 MMOL/L (ref 136–145)
WBC # BLD AUTO: 4.98 K/UL (ref 3.9–12.7)

## 2022-06-13 PROCEDURE — 77014 PR  CT GUIDANCE PLACEMENT RAD THERAPY FIELDS: ICD-10-PCS | Mod: 26,,, | Performed by: RADIOLOGY

## 2022-06-13 PROCEDURE — 80053 COMPREHEN METABOLIC PANEL: CPT | Performed by: STUDENT IN AN ORGANIZED HEALTH CARE EDUCATION/TRAINING PROGRAM

## 2022-06-13 PROCEDURE — 36415 COLL VENOUS BLD VENIPUNCTURE: CPT | Performed by: STUDENT IN AN ORGANIZED HEALTH CARE EDUCATION/TRAINING PROGRAM

## 2022-06-13 PROCEDURE — 85025 COMPLETE CBC W/AUTO DIFF WBC: CPT | Performed by: STUDENT IN AN ORGANIZED HEALTH CARE EDUCATION/TRAINING PROGRAM

## 2022-06-13 PROCEDURE — 77386 HC IMRT, COMPLEX: CPT | Performed by: RADIOLOGY

## 2022-06-13 PROCEDURE — 77014 HC CT GUIDANCE RADIATION THERAPY FLDS PLACEMENT: CPT | Mod: TC | Performed by: RADIOLOGY

## 2022-06-13 PROCEDURE — 77014 PR  CT GUIDANCE PLACEMENT RAD THERAPY FIELDS: CPT | Mod: 26,,, | Performed by: RADIOLOGY

## 2022-06-13 PROCEDURE — 77336 RADIATION PHYSICS CONSULT: CPT | Performed by: RADIOLOGY

## 2022-06-14 PROCEDURE — 77014 PR  CT GUIDANCE PLACEMENT RAD THERAPY FIELDS: CPT | Mod: 26,,, | Performed by: RADIOLOGY

## 2022-06-14 PROCEDURE — 77014 HC CT GUIDANCE RADIATION THERAPY FLDS PLACEMENT: CPT | Mod: TC | Performed by: RADIOLOGY

## 2022-06-14 PROCEDURE — 77014 PR  CT GUIDANCE PLACEMENT RAD THERAPY FIELDS: ICD-10-PCS | Mod: 26,,, | Performed by: RADIOLOGY

## 2022-06-14 PROCEDURE — 77386 HC IMRT, COMPLEX: CPT | Performed by: RADIOLOGY

## 2022-06-14 NOTE — TELEPHONE ENCOUNTER
Incoming call from patient, patient returning phone call but RPH not available, messaging assigned RPH to alert.

## 2022-06-14 NOTE — TELEPHONE ENCOUNTER
Spoke with pt regarding change in dose of Temodar. Pt stated his last radiation treatment is on 6/20. There's a note on the RX stating that he will start new maintenance doses around 7/18. Pending out refill until the week before to send out maintenance dose.

## 2022-06-15 PROCEDURE — 77386 HC IMRT, COMPLEX: CPT | Performed by: RADIOLOGY

## 2022-06-15 PROCEDURE — 77014 PR  CT GUIDANCE PLACEMENT RAD THERAPY FIELDS: ICD-10-PCS | Mod: 26,,, | Performed by: RADIOLOGY

## 2022-06-15 PROCEDURE — 77014 PR  CT GUIDANCE PLACEMENT RAD THERAPY FIELDS: CPT | Mod: 26,,, | Performed by: RADIOLOGY

## 2022-06-15 PROCEDURE — 77014 HC CT GUIDANCE RADIATION THERAPY FLDS PLACEMENT: CPT | Mod: TC | Performed by: RADIOLOGY

## 2022-06-16 ENCOUNTER — DOCUMENTATION ONLY (OUTPATIENT)
Dept: RADIATION ONCOLOGY | Facility: CLINIC | Age: 37
End: 2022-06-16
Payer: COMMERCIAL

## 2022-06-16 PROCEDURE — 77014 PR  CT GUIDANCE PLACEMENT RAD THERAPY FIELDS: CPT | Mod: 26,,, | Performed by: RADIOLOGY

## 2022-06-16 PROCEDURE — 77386 HC IMRT, COMPLEX: CPT | Performed by: RADIOLOGY

## 2022-06-16 PROCEDURE — 77014 PR  CT GUIDANCE PLACEMENT RAD THERAPY FIELDS: ICD-10-PCS | Mod: 26,,, | Performed by: RADIOLOGY

## 2022-06-16 PROCEDURE — 77014 HC CT GUIDANCE RADIATION THERAPY FLDS PLACEMENT: CPT | Mod: TC | Performed by: RADIOLOGY

## 2022-06-16 NOTE — PLAN OF CARE
Day 28 iof outpatient radiation to brain. Overall doing well. No headaches, dizziness, or blurry vision. No nausea or vomiting. Notes increased fatigue. 3mo f/u scheduled.

## 2022-06-17 PROCEDURE — 77014 HC CT GUIDANCE RADIATION THERAPY FLDS PLACEMENT: CPT | Mod: TC | Performed by: RADIOLOGY

## 2022-06-17 PROCEDURE — 77014 PR  CT GUIDANCE PLACEMENT RAD THERAPY FIELDS: ICD-10-PCS | Mod: 26,,, | Performed by: RADIOLOGY

## 2022-06-17 PROCEDURE — 77014 PR  CT GUIDANCE PLACEMENT RAD THERAPY FIELDS: CPT | Mod: 26,,, | Performed by: RADIOLOGY

## 2022-06-17 PROCEDURE — 77386 HC IMRT, COMPLEX: CPT | Performed by: RADIOLOGY

## 2022-06-20 PROCEDURE — 77014 HC CT GUIDANCE RADIATION THERAPY FLDS PLACEMENT: CPT | Mod: TC | Performed by: RADIOLOGY

## 2022-06-20 PROCEDURE — 77336 RADIATION PHYSICS CONSULT: CPT | Performed by: RADIOLOGY

## 2022-06-20 PROCEDURE — 77014 PR  CT GUIDANCE PLACEMENT RAD THERAPY FIELDS: CPT | Mod: 26,,, | Performed by: RADIOLOGY

## 2022-06-20 PROCEDURE — 77014 PR  CT GUIDANCE PLACEMENT RAD THERAPY FIELDS: ICD-10-PCS | Mod: 26,,, | Performed by: RADIOLOGY

## 2022-06-20 PROCEDURE — 77386 HC IMRT, COMPLEX: CPT | Performed by: RADIOLOGY

## 2022-06-21 ENCOUNTER — OFFICE VISIT (OUTPATIENT)
Dept: NEUROSURGERY | Facility: CLINIC | Age: 37
End: 2022-06-21
Payer: COMMERCIAL

## 2022-06-21 ENCOUNTER — HOSPITAL ENCOUNTER (OUTPATIENT)
Dept: RADIOLOGY | Facility: HOSPITAL | Age: 37
Discharge: HOME | End: 2022-06-21
Attending: NEUROLOGICAL SURGERY
Payer: COMMERCIAL

## 2022-06-21 ENCOUNTER — OFFICE VISIT (OUTPATIENT)
Dept: HEMATOLOGY/ONCOLOGY | Facility: CLINIC | Age: 37
End: 2022-06-21
Payer: COMMERCIAL

## 2022-06-21 VITALS
HEIGHT: 66 IN | TEMPERATURE: 98 F | WEIGHT: 160.5 LBS | DIASTOLIC BLOOD PRESSURE: 72 MMHG | OXYGEN SATURATION: 97 % | RESPIRATION RATE: 18 BRPM | SYSTOLIC BLOOD PRESSURE: 134 MMHG | HEART RATE: 78 BPM | BODY MASS INDEX: 25.79 KG/M2

## 2022-06-21 DIAGNOSIS — D84.821 IMMUNODEFICIENCY DUE TO DRUG THERAPY: ICD-10-CM

## 2022-06-21 DIAGNOSIS — Z79.899 IMMUNODEFICIENCY DUE TO DRUG THERAPY: ICD-10-CM

## 2022-06-21 DIAGNOSIS — C71.9 ASTROCYTOMA: Primary | ICD-10-CM

## 2022-06-21 DIAGNOSIS — C71.9 ASTROCYTOMA: ICD-10-CM

## 2022-06-21 DIAGNOSIS — R03.0 ELEVATED BP WITHOUT DIAGNOSIS OF HYPERTENSION: ICD-10-CM

## 2022-06-21 DIAGNOSIS — C71.1 MALIGNANT ASTROCYTOMA OF FRONTAL LOBE: Primary | ICD-10-CM

## 2022-06-21 PROCEDURE — 99214 PR OFFICE/OUTPT VISIT, EST, LEVL IV, 30-39 MIN: ICD-10-PCS | Mod: S$GLB,,, | Performed by: NEUROLOGICAL SURGERY

## 2022-06-21 PROCEDURE — 1159F MED LIST DOCD IN RCRD: CPT | Mod: CPTII,S$GLB,, | Performed by: STUDENT IN AN ORGANIZED HEALTH CARE EDUCATION/TRAINING PROGRAM

## 2022-06-21 PROCEDURE — 99215 PR OFFICE/OUTPT VISIT, EST, LEVL V, 40-54 MIN: ICD-10-PCS | Mod: S$GLB,,, | Performed by: STUDENT IN AN ORGANIZED HEALTH CARE EDUCATION/TRAINING PROGRAM

## 2022-06-21 PROCEDURE — 1160F PR REVIEW ALL MEDS BY PRESCRIBER/CLIN PHARMACIST DOCUMENTED: ICD-10-PCS | Mod: CPTII,S$GLB,, | Performed by: STUDENT IN AN ORGANIZED HEALTH CARE EDUCATION/TRAINING PROGRAM

## 2022-06-21 PROCEDURE — 3008F PR BODY MASS INDEX (BMI) DOCUMENTED: ICD-10-PCS | Mod: CPTII,S$GLB,, | Performed by: STUDENT IN AN ORGANIZED HEALTH CARE EDUCATION/TRAINING PROGRAM

## 2022-06-21 PROCEDURE — 1159F MED LIST DOCD IN RCRD: CPT | Mod: CPTII,S$GLB,, | Performed by: NEUROLOGICAL SURGERY

## 2022-06-21 PROCEDURE — 99215 OFFICE O/P EST HI 40 MIN: CPT | Mod: S$GLB,,, | Performed by: STUDENT IN AN ORGANIZED HEALTH CARE EDUCATION/TRAINING PROGRAM

## 2022-06-21 PROCEDURE — 99999 PR PBB SHADOW E&M-EST. PATIENT-LVL II: CPT | Mod: PBBFAC,,, | Performed by: NEUROLOGICAL SURGERY

## 2022-06-21 PROCEDURE — 1159F PR MEDICATION LIST DOCUMENTED IN MEDICAL RECORD: ICD-10-PCS | Mod: CPTII,S$GLB,, | Performed by: STUDENT IN AN ORGANIZED HEALTH CARE EDUCATION/TRAINING PROGRAM

## 2022-06-21 PROCEDURE — 1160F RVW MEDS BY RX/DR IN RCRD: CPT | Mod: CPTII,S$GLB,, | Performed by: STUDENT IN AN ORGANIZED HEALTH CARE EDUCATION/TRAINING PROGRAM

## 2022-06-21 PROCEDURE — 25500020 PHARM REV CODE 255: Performed by: NEUROLOGICAL SURGERY

## 2022-06-21 PROCEDURE — 1160F PR REVIEW ALL MEDS BY PRESCRIBER/CLIN PHARMACIST DOCUMENTED: ICD-10-PCS | Mod: CPTII,S$GLB,, | Performed by: NEUROLOGICAL SURGERY

## 2022-06-21 PROCEDURE — 70553 MRI BRAIN W WO CONTRAST: ICD-10-PCS | Mod: 26,,, | Performed by: RADIOLOGY

## 2022-06-21 PROCEDURE — 99999 PR PBB SHADOW E&M-EST. PATIENT-LVL II: ICD-10-PCS | Mod: PBBFAC,,, | Performed by: NEUROLOGICAL SURGERY

## 2022-06-21 PROCEDURE — 1159F PR MEDICATION LIST DOCUMENTED IN MEDICAL RECORD: ICD-10-PCS | Mod: CPTII,S$GLB,, | Performed by: NEUROLOGICAL SURGERY

## 2022-06-21 PROCEDURE — A9585 GADOBUTROL INJECTION: HCPCS | Performed by: NEUROLOGICAL SURGERY

## 2022-06-21 PROCEDURE — 1160F RVW MEDS BY RX/DR IN RCRD: CPT | Mod: CPTII,S$GLB,, | Performed by: NEUROLOGICAL SURGERY

## 2022-06-21 PROCEDURE — 3078F PR MOST RECENT DIASTOLIC BLOOD PRESSURE < 80 MM HG: ICD-10-PCS | Mod: CPTII,S$GLB,, | Performed by: STUDENT IN AN ORGANIZED HEALTH CARE EDUCATION/TRAINING PROGRAM

## 2022-06-21 PROCEDURE — 70553 MRI BRAIN STEM W/O & W/DYE: CPT | Mod: TC

## 2022-06-21 PROCEDURE — 3008F BODY MASS INDEX DOCD: CPT | Mod: CPTII,S$GLB,, | Performed by: STUDENT IN AN ORGANIZED HEALTH CARE EDUCATION/TRAINING PROGRAM

## 2022-06-21 PROCEDURE — 3075F PR MOST RECENT SYSTOLIC BLOOD PRESS GE 130-139MM HG: ICD-10-PCS | Mod: CPTII,S$GLB,, | Performed by: STUDENT IN AN ORGANIZED HEALTH CARE EDUCATION/TRAINING PROGRAM

## 2022-06-21 PROCEDURE — 3075F SYST BP GE 130 - 139MM HG: CPT | Mod: CPTII,S$GLB,, | Performed by: STUDENT IN AN ORGANIZED HEALTH CARE EDUCATION/TRAINING PROGRAM

## 2022-06-21 PROCEDURE — 99999 PR PBB SHADOW E&M-EST. PATIENT-LVL III: ICD-10-PCS | Mod: PBBFAC,,, | Performed by: STUDENT IN AN ORGANIZED HEALTH CARE EDUCATION/TRAINING PROGRAM

## 2022-06-21 PROCEDURE — 70553 MRI BRAIN STEM W/O & W/DYE: CPT | Mod: 26,,, | Performed by: RADIOLOGY

## 2022-06-21 PROCEDURE — 3078F DIAST BP <80 MM HG: CPT | Mod: CPTII,S$GLB,, | Performed by: STUDENT IN AN ORGANIZED HEALTH CARE EDUCATION/TRAINING PROGRAM

## 2022-06-21 PROCEDURE — 99999 PR PBB SHADOW E&M-EST. PATIENT-LVL III: CPT | Mod: PBBFAC,,, | Performed by: STUDENT IN AN ORGANIZED HEALTH CARE EDUCATION/TRAINING PROGRAM

## 2022-06-21 PROCEDURE — 99214 OFFICE O/P EST MOD 30 MIN: CPT | Mod: S$GLB,,, | Performed by: NEUROLOGICAL SURGERY

## 2022-06-21 RX ORDER — GADOBUTROL 604.72 MG/ML
8 INJECTION INTRAVENOUS
Status: COMPLETED | OUTPATIENT
Start: 2022-06-21 | End: 2022-06-21

## 2022-06-21 RX ADMIN — GADOBUTROL 8 ML: 604.72 INJECTION INTRAVENOUS at 11:06

## 2022-06-21 NOTE — PROGRESS NOTES
PATIENT: Дмитрий Winn  MRN: [id number]  DATE: 6/21/2022      Diagnosis:   1. Malignant astrocytoma of frontal lobe    2. Elevated BP without diagnosis of hypertension    3. Immunodeficiency due to drug therapy        Chief Complaint: Malignant astrocytoma of frontal lobe      Oncologic History:    Oncologic History 1. Astrocytoma, IDH mutated    Oncologic Treatment 1. 8/2018 STR  2. 2/17/22 STR  3. 5/9/22-6/20/22 chemoradiation with temozolomide    Pathology 1. 8/10/2018. Pathology: Diffuse Astrocytoma IDH1 and ATRX mutated. P53 over expressed, WHO Grade II  Genomic alterations include loss of 2q37.1q37.3, gain of 7q22.1q36.3, gain of  11q23.3q24.1, copy neutral loss of heterozygosity (cnLOH) of 17p13.3p11.1 (including TP53), gain of 18p11.32p11.21, gain of 18q21.2q23, and gain of 19p13.3p11 (including PTBP1).          Subjective:    Interval History: Mr. Winn is here for follow up.  He is a former patient of Dr. Coronado.    36 year old man with history of epilepsy and initial diagnosis of astrocytoma, WHO grade II s/p STR 8/2018.  When he was first diagnosed, he presented with a seizure to [facility] 8/2018.  He was followed with surveillance with recent MRI 1/29/22 showing interval growth of the right frontal lesion with mild increased mass effect; patient at his follow up visit with Dr. Van was asymptomatic at the time.  Decision was made to perform craniotomy.  2/17/22 he had a STR and final pathology showed WHO grade II astrocytoma, IDH mutated. 5/9/22 he started chemoradiation and finished on 6/20/22.  ---  Overall he did well with treatment with the only main symptom being post-radiation fatigue, which has not affected his work.  Denies any significant nausea or constipation.  No new focal neuro symptoms to report today.  He continues to work full-time without difficulty. Denies any recurrent seizures.    He currently works as a del real. He lives on the [place].      He is  alone at this visit today.    Past Medical History:   Past Medical History:   Diagnosis Date    Ataxia 5/29/2020    Cancer     brain tumor    Epilepsy     Normocytic anemia 3/8/2022       Past Surgical HIstory:   Past Surgical History:   Procedure Laterality Date    CRANIOTOMY FOR EXCISION OF INTRACRANIAL TUMOR Right 8/10/2018    Procedure: CRANIOTOMY, FOR INTRACRANIAL NEOPLASM EXCISION Right eyebrow incision / [facility] craniotomy for tumor resection;  Surgeon: Lefty Paulson MD;  Location: [facility] OR;  Service: Neurosurgery;  Laterality: Right;    CRANIOTOMY USING FRAMELESS STEREOTAXY Right 2/17/2022    Procedure: CRANIOTOMY, USING FRAMELESS STEREOTAXY;  Surgeon: Chago Van MD;  Location: [facility] OR [facility] FLR;  Service: Neurosurgery;  Laterality: Right;    cyst removed      SURGICAL REMOVAL OF PILONIDAL CYST         Family History:   Family History   Problem Relation Age of Onset    Cancer Maternal Grandfather     Prostate cancer Maternal Grandfather     Cancer Paternal Grandmother     Breast cancer Paternal Grandmother     Hypertension Mother     No Known Problems Sister     No Known Problems Brother        Social History:  reports that he quit smoking about 3 years ago. His smoking use included cigarettes. He started smoking about 12 years ago. He has a 8.00 pack-year smoking history. He has never used smokeless tobacco. He reports current alcohol use of about 42.0 standard drinks of alcohol per week. He reports that he does not use drugs.    Allergies:  Review of patient's allergies indicates:  No Known Allergies    Medications:  Current Outpatient Medications   Medication Sig Dispense Refill    indomethacin (INDOCIN) 50 MG capsule Take 1 capsule (50 mg total) by mouth 2 (two) times daily with meals. 90 capsule 0    levETIRAcetam (KEPPRA) 750 MG Tab Take 1 tablet (750 mg total) by mouth 2 (two) times daily. 60 tablet 11    ondansetron (ZOFRAN) 8 MG tablet Take 1 tablet (8 mg total) by  mouth every 8 (eight) hours as needed for Nausea. 30 tablet 3    prochlorperazine (COMPAZINE) 10 MG tablet Take 1 tablet (10 mg total) by mouth 3 (three) times daily as needed (use when zofran does not work). 30 tablet 3    temozolomide (TEMODAR) 20 MG capsule Take 1 capsule (20 mg total) by mouth once daily Take as directed days 1-5 of every 28 days. Take on an empty stomach. with 2 other temozolomide prescriptions for 275 mg total. 5 capsule 11    temozolomide (TEMODAR) 250 MG capsule Take 1 capsule (250 mg total) by mouth once daily Take as directed days 1-5 of every 28 days. Take on an empty stomach. with 2 other temozolomide prescriptions for 275 mg total. 5 capsule 11    temozolomide (TEMODAR) 5 MG capsule Take 1 capsule (5 mg total) by mouth once daily Take as directed days 1-5 of every 28 days. Take on an empty stomach. with 2 other temozolomide prescriptions for 275 mg total. 5 capsule 11    famotidine (PEPCID) 20 MG tablet Take 1 tablet (20 mg total) by mouth 2 (two) times daily. 60 tablet 0     No current facility-administered medications for this visit.       Review of Systems   Constitutional: Positive for fatigue. Negative for activity change, appetite change, chills, diaphoresis, fever and unexpected weight change.   HENT: Negative for nosebleeds and trouble swallowing.    Eyes: Negative for visual disturbance.   Respiratory: Negative for cough, chest tightness, shortness of breath and wheezing.    Cardiovascular: Negative for chest pain and leg swelling.   Gastrointestinal: Negative for abdominal distention, abdominal pain, blood in stool, constipation, diarrhea, nausea and vomiting.   Endocrine: Negative for cold intolerance and heat intolerance.   Genitourinary: Negative for difficulty urinating and dysuria.   Musculoskeletal: Negative for arthralgias and back pain.   Skin: Negative for color change.   Neurological: Negative for dizziness, seizures, weakness, light-headedness, numbness and  "headaches.   Hematological: Negative for adenopathy. Does not bruise/bleed easily.   Psychiatric/Behavioral: Negative for confusion and sleep disturbance.       ECOG Performance Status:     ECOG SCORE    0 - Fully active-able to carry on all pre-disease performance without restriction         Objective:      Vitals:   Vitals:    06/21/22 1319   BP: 134/72   BP Location: Left arm   Patient Position: Sitting   BP Method: Medium (Automatic)   Pulse: 78   Resp: 18   Temp: 98.4 °F (36.9 °C)   TempSrc: Oral   SpO2: 97%   Weight: 72.8 kg (160 lb 7.9 oz)   Height: 5' 6" (1.676 m)     BMI: Body mass index is 25.9 kg/m².       Physical Exam  Constitutional:       General: He is not in acute distress.     Appearance: Normal appearance. He is not ill-appearing.   HENT:      Head: Normocephalic.      Nose: Nose normal.      Mouth/Throat:      Pharynx: No oropharyngeal exudate or posterior oropharyngeal erythema.   Eyes:      General: No scleral icterus.     Extraocular Movements: Extraocular movements intact.      Conjunctiva/sclera: Conjunctivae normal.      Pupils: Pupils are equal, round, and reactive to light.   Cardiovascular:      Rate and Rhythm: Normal rate and regular rhythm.      Heart sounds: No murmur heard.    No friction rub. No gallop.   Pulmonary:      Effort: Pulmonary effort is normal. No respiratory distress.      Breath sounds: No stridor. No wheezing, rhonchi or rales.   Abdominal:      General: Bowel sounds are normal. There is no distension.      Palpations: Abdomen is soft. There is no mass.      Tenderness: There is no abdominal tenderness. There is no guarding or rebound.   Musculoskeletal:         General: Normal range of motion.      Cervical back: Normal range of motion and neck supple.      Right lower leg: No edema.      Left lower leg: No edema.   Skin:     General: Skin is warm and dry.   Neurological:      General: No focal deficit present.      Mental Status: He is alert.         Laboratory " Data:   No results found for this or any previous visit (from the past 168 hour(s)).  Lab Results   Component Value Date    WBC 4.98 06/13/2022    HGB 14.5 06/13/2022    HCT 42.8 06/13/2022    MCV 90 06/13/2022     06/13/2022       BMP  Lab Results   Component Value Date     (L) 06/13/2022    K 4.0 06/13/2022     06/13/2022    CO2 21 (L) 06/13/2022    BUN 9 06/13/2022    CREATININE 0.9 06/13/2022    CALCIUM 9.4 06/13/2022    ANIONGAP 11 06/13/2022    ESTGFRAFRICA >60.0 06/13/2022    EGFRNONAA >60.0 06/13/2022         Imaging:     MRI Brain W WO Contrast    Result Date: 6/21/2022  EXAMINATION: MRI BRAIN W WO CONTRAST CLINICAL HISTORY: brain mass; Malignant neoplasm of brain, unspecified TECHNIQUE: Multiplanar multisequence MR imaging of the brain was performed before and after the administration of  mL Gadavist intravenous contrast. COMPARISON: Prior studies with the most recent dated 04/28/2022 FINDINGS: There has been further evolution of the right frontal operative bed resection cavity particularly along the dependent posterior margin.   No evidence of new signal abnormality or nodular enhancement is identified. No midline shift herniation or new acute intracranial process. Right frontal developmental venous anomaly again noted. Normal arterial flow voids are preserved at the skull base. The visualized sinuses and mastoid air cells are clear.     Further vaulting postsurgical changes.  No new enhancing lesion or evidence of neoplastic progression. Electronically signed by: Ayaz Hughes Date:    06/21/2022 Time:    13:59      Assessment:       1. Malignant astrocytoma of frontal lobe    2. Elevated BP without diagnosis of hypertension    3. Immunodeficiency due to drug therapy           Plan:       Problem List Items Addressed This Visit        Cardiac/Vascular    Elevated BP without diagnosis of hypertension    Current Assessment & Plan     BP adequate today  -continue monitoring               Immunology/Multi System    Immunodeficiency due to drug therapy    Current Assessment & Plan     Afebrile, ANC sufficient.  -monitor cbc              Oncology    Malignant astrocytoma of frontal lobe - Primary    Overview     2/17/22 STR for recurrent WHO grade II astrocytoma, IDH mutated.  5/9/22-6/20/22 completed chemoradiation with temozolomide           Current Assessment & Plan     Did well with chemoradiation. Denies any new symptoms or side effects.  Post-radiation fatigue is the only symptom and is relatively mild for him.  -labs reviewed; mild lymphopenia but ok to stop bactrim now that he has completed radiation  -MRI brain reviewed; no evidence of progression  -discussed plan for maintenance therapy of 12 cycles of temozolomide. Tentative plan to start cycle 1 on  7/18/22; will get labs that day and have him follow up with me 8/10/22 prior to cycle 2 (8/15/22).                 No orders of the defined types were placed in this encounter.    Route Chart for Scheduling    Med Onc Chart Routing      Follow up with physician Other. 8/10   Follow up with [facility]    Labs CMP and CBC   Lab interval:  7/18 and 8/10 cmp cbc   Imaging None      Pharmacy appointment No pharmacy appointment needed      Other referrals No additional referrals needed       Oral chemo, does not need to be scheduled.  Treatment Plan Information   OP TEMOZOLOMIDE D1-5 Q4W   Sha Pan MD   Upcoming Treatment Dates - OP TEMOZOLOMIDE D1-5 Q4W    No upcoming days in selected categories.          Sha Pan MD  Hematology Oncology

## 2022-06-21 NOTE — ASSESSMENT & PLAN NOTE
Did well with chemoradiation. Denies any new symptoms or side effects.  Post-radiation fatigue is the only symptom and is relatively mild for him.  -labs reviewed; mild lymphopenia but ok to stop bactrim now that he has completed radiation  -MRI brain reviewed; no evidence of progression  -discussed plan for maintenance therapy of 12 cycles of temozolomide. Tentative plan to start cycle 1 on  7/18/22; will get labs that day and have him follow up with me 8/10/22 prior to cycle 2 (8/15/22).

## 2022-06-21 NOTE — PATIENT INSTRUCTIONS
I have personally reviewed the MRI brain with the pt which shows no hydracephalus, herniation, or acute infarctions. No new lesions.     I will schedule the patient for 3 month follow up with MRI brain.

## 2022-06-21 NOTE — PROGRESS NOTES
Subjective:   I, Colleen Romero, attest that this documentation has been prepared under the direction and in the presence of Chago Van MD.     Patient ID: Дмитрий Winn is a 36 y.o. male     Chief Complaint: No chief complaint on file.          HPI  Mr. Дмитрий Winn is a 36 y.o. gentleman with history of epilepsy, right intracranial craniotomy (8/10/2018) performed by Lefty Paulson MD, pathology reported Astrocytoma WHO Grade II, and s/p right frontal craniotomy for astrocytoma done on 2/17/2022, who presents today for 3 month follow up astrocytoma with MRI brain.    This is a pt who presented to [facility] ED on 8/8/2018, for a seizure. Neuroimaging showed a large frontal mass with an unusual appearance suspicious for low grade glioma or neuroglial cyst. A right supraorbital frontal craniotomy and resection of gyrus rectus tumor was done on 8/10/2018. 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 Pt continued to be monitored and followed up with MRI brain frequently. The pt presented to me on 3/9/21 for a recent MRI brain that revealed increased in size glioma. At the time of our last visit on 3/8/2022, the patient had no complaints.      Today the patient reports he is doing well in the interim. He notes his energy levels are back to normal. Patient just came back from a wedding. He has no new complaints at this time.    Review of Systems   Constitutional: Negative for activity change, appetite change, fatigue, fever and unexpected weight change.   HENT: Negative for facial swelling.    Eyes: Negative.    Respiratory: Negative.    Cardiovascular: Negative.    Gastrointestinal: Negative for diarrhea, nausea and vomiting.   Endocrine: Negative.    Genitourinary: Negative.    Musculoskeletal: Negative for back pain, joint swelling, myalgias and neck pain.   Neurological: Negative for dizziness, seizures, weakness, numbness and headaches.   Psychiatric/Behavioral: Negative.        Past Medical History:   Diagnosis Date    Ataxia 5/29/2020    Cancer     brain tumor    Epilepsy     Normocytic anemia 3/8/2022       Objective:    There were no vitals filed for this visit.   Physical Exam  Constitutional:       General: He is not in acute distress.     Appearance: Normal appearance.   HENT:      Head: Normocephalic and atraumatic.   Pulmonary:      Effort: Pulmonary effort is normal.   Musculoskeletal:      Cervical back: Neck supple.   Neurological:      Mental Status: He is alert and oriented to person, place, and time.      GCS: GCS eye subscore is 4. GCS verbal subscore is 5. GCS motor subscore is 6.      Cranial Nerves: No cranial nerve deficit.            IMAGING:  MRI Brain W WO Contrast (6/21/2022):  No hydracephalus, herniation, or acute infarctions. No new lesions.     I have personally reviewed the images with the pt.      I, Dr. Chago Van, personally performed the services described in this documentation. All medical record entries made by the scribe, Colleen Romero, were at my direction and in my presence.  I have reviewed the chart and agree that the record reflects my personal performance and is accurate and complete. Chago Van MD. 06/21/2022    Assessment:       Astrocytoma.     Plan:   I have personally reviewed the MRI brain with the pt which shows no hydracephalus, herniation, or acute infarctions. No new lesions.     I will schedule the patient for 3 month follow up with MRI brain.

## 2022-07-11 ENCOUNTER — PATIENT MESSAGE (OUTPATIENT)
Dept: PHARMACY | Facility: CLINIC | Age: 37
End: 2022-07-11
Payer: COMMERCIAL

## 2022-07-12 ENCOUNTER — SPECIALTY PHARMACY (OUTPATIENT)
Dept: PHARMACY | Facility: CLINIC | Age: 37
End: 2022-07-12
Payer: COMMERCIAL

## 2022-07-12 NOTE — TELEPHONE ENCOUNTER
Specialty Pharmacy - Refill Coordination    Specialty Medication Orders Linked to Encounter    Flowsheet Row Most Recent Value   Medication #1 temozolomide (TEMODAR) 5 MG capsule (Order#[id number], Rx#[id number])   Medication #2 temozolomide (TEMODAR) 20 MG capsule (Order#[id number], Rx#[id number])   Medication #3 temozolomide (TEMODAR) 250 MG capsule (Order#[id number], Rx#[id number])          Refill Questions - Documented Responses    Flowsheet Row Most Recent Value   Patient Availability and HIPAA Verification    Does patient want to proceed with activity? Yes   HIPAA/medical authority confirmed? Yes   Relationship to patient of person spoken to? Self   Refill Screening Questions    Changes to allergies? No   Changes to medications? No   New conditions since last clinic visit? No   Unplanned office visit, urgent care, ED, or hospital admission in the last 4 weeks? No   How does patient/caregiver feel medication is working? Good   Financial problems or insurance changes? No   How many doses of your specialty medications were missed in the last 4 weeks? 0   Would patient like to speak to a pharmacist? No   When does the patient need to receive the medication? 07/19/22   Refill Delivery Questions    How will the patient receive the medication? Delivery Alpa   When does the patient need to receive the medication? 07/19/22   Shipping Address Home   Address in [facility] confirmed and updated if neccessary? Yes   Expected Copay ($) 0   Is the patient able to afford the medication copay? Yes   Payment Method zero copay   Days supply of Refill 28   Supplies needed? No supplies needed   Refill activity completed? Yes   Refill activity plan Refill scheduled   Shipment/Pickup Date: 07/14/22          Current Outpatient Medications   Medication Sig    famotidine (PEPCID) 20 MG tablet Take 1 tablet (20 mg total) by mouth 2 (two) times daily.    indomethacin (INDOCIN) 50 MG capsule Take 1 capsule (50 mg total) by mouth  2 (two) times daily with meals.    levETIRAcetam (KEPPRA) 750 MG Tab Take 1 tablet (750 mg total) by mouth 2 (two) times daily.    ondansetron (ZOFRAN) 8 MG tablet Take 1 tablet (8 mg total) by mouth every 8 (eight) hours as needed for Nausea.    prochlorperazine (COMPAZINE) 10 MG tablet Take 1 tablet (10 mg total) by mouth 3 (three) times daily as needed (use when zofran does not work).    temozolomide (TEMODAR) 20 MG capsule Take 1 capsule (20 mg total) by mouth once daily Take as directed days 1-5 of every 28 days. Take on an empty stomach. with 2 other temozolomide prescriptions for 275 mg total.    temozolomide (TEMODAR) 250 MG capsule Take 1 capsule (250 mg total) by mouth once daily Take as directed days 1-5 of every 28 days. Take on an empty stomach. with 2 other temozolomide prescriptions for 275 mg total.    temozolomide (TEMODAR) 5 MG capsule Take 1 capsule (5 mg total) by mouth once daily Take as directed days 1-5 of every 28 days. Take on an empty stomach. with 2 other temozolomide prescriptions for 275 mg total.   Last reviewed on 6/22/2022 12:32 PM by Chago Van MD    Review of patient's allergies indicates:  No Known Allergies Last reviewed on  6/21/2022 1:27 PM by Lory Ruiz      Tasks added this encounter   8/9/2022 - Refill Call (Auto Added)   Tasks due within next 3 months   10/11/2022 - Clinical - Follow Up Assesement (180 day)     Brandi Marroquin, PharmD  [facility]  [address] [place] [address]  Phone: [phone]  Fax: [phone]       Yes

## 2022-07-18 ENCOUNTER — LAB VISIT (OUTPATIENT)
Dept: LAB | Facility: HOSPITAL | Age: 37
End: 2022-07-18
Attending: STUDENT IN AN ORGANIZED HEALTH CARE EDUCATION/TRAINING PROGRAM
Payer: COMMERCIAL

## 2022-07-18 DIAGNOSIS — C71.9 ASTROCYTOMA BRAIN TUMOR: ICD-10-CM

## 2022-07-18 LAB
ALBUMIN SERPL BCP-MCNC: 4.7 G/DL (ref 3.5–5.2)
ALP SERPL-CCNC: 85 U/L (ref 55–135)
ALT SERPL W/O P-5'-P-CCNC: 39 U/L (ref 10–44)
ANION GAP SERPL CALC-SCNC: 12 MMOL/L (ref 8–16)
AST SERPL-CCNC: 36 U/L (ref 10–40)
BASOPHILS # BLD AUTO: 0.03 K/UL (ref 0–0.2)
BASOPHILS NFR BLD: 0.6 % (ref 0–1.9)
BILIRUB SERPL-MCNC: 0.7 MG/DL (ref 0.1–1)
BUN SERPL-MCNC: 7 MG/DL (ref 6–20)
CALCIUM SERPL-MCNC: 9.8 MG/DL (ref 8.7–10.5)
CHLORIDE SERPL-SCNC: 102 MMOL/L (ref 95–110)
CO2 SERPL-SCNC: 25 MMOL/L (ref 23–29)
CREAT SERPL-MCNC: 1.1 MG/DL (ref 0.5–1.4)
DIFFERENTIAL METHOD: ABNORMAL
EOSINOPHIL # BLD AUTO: 0.1 K/UL (ref 0–0.5)
EOSINOPHIL NFR BLD: 2.7 % (ref 0–8)
ERYTHROCYTE [DISTWIDTH] IN BLOOD BY AUTOMATED COUNT: 11.9 % (ref 11.5–14.5)
EST. GFR  (AFRICAN AMERICAN): >60 ML/MIN/1.73 M^2
EST. GFR  (NON AFRICAN AMERICAN): >60 ML/MIN/1.73 M^2
GLUCOSE SERPL-MCNC: 100 MG/DL (ref 70–110)
HCT VFR BLD AUTO: 43.7 % (ref 40–54)
HGB BLD-MCNC: 15.3 G/DL (ref 14–18)
IMM GRANULOCYTES # BLD AUTO: 0.01 K/UL (ref 0–0.04)
IMM GRANULOCYTES NFR BLD AUTO: 0.2 % (ref 0–0.5)
LYMPHOCYTES # BLD AUTO: 0.8 K/UL (ref 1–4.8)
LYMPHOCYTES NFR BLD: 14.8 % (ref 18–48)
MCH RBC QN AUTO: 29.9 PG (ref 27–31)
MCHC RBC AUTO-ENTMCNC: 35 G/DL (ref 32–36)
MCV RBC AUTO: 86 FL (ref 82–98)
MONOCYTES # BLD AUTO: 0.6 K/UL (ref 0.3–1)
MONOCYTES NFR BLD: 11 % (ref 4–15)
NEUTROPHILS # BLD AUTO: 3.7 K/UL (ref 1.8–7.7)
NEUTROPHILS NFR BLD: 70.7 % (ref 38–73)
NRBC BLD-RTO: 0 /100 WBC
PLATELET # BLD AUTO: 237 K/UL (ref 150–450)
PMV BLD AUTO: 11.2 FL (ref 9.2–12.9)
POTASSIUM SERPL-SCNC: 4.2 MMOL/L (ref 3.5–5.1)
PROT SERPL-MCNC: 7.7 G/DL (ref 6–8.4)
RBC # BLD AUTO: 5.11 M/UL (ref 4.6–6.2)
SODIUM SERPL-SCNC: 139 MMOL/L (ref 136–145)
WBC # BLD AUTO: 5.26 K/UL (ref 3.9–12.7)

## 2022-07-18 PROCEDURE — 36415 COLL VENOUS BLD VENIPUNCTURE: CPT | Performed by: STUDENT IN AN ORGANIZED HEALTH CARE EDUCATION/TRAINING PROGRAM

## 2022-07-18 PROCEDURE — 80053 COMPREHEN METABOLIC PANEL: CPT | Performed by: STUDENT IN AN ORGANIZED HEALTH CARE EDUCATION/TRAINING PROGRAM

## 2022-07-18 PROCEDURE — 85025 COMPLETE CBC W/AUTO DIFF WBC: CPT | Performed by: STUDENT IN AN ORGANIZED HEALTH CARE EDUCATION/TRAINING PROGRAM

## 2022-07-19 ENCOUNTER — PATIENT MESSAGE (OUTPATIENT)
Dept: HEMATOLOGY/ONCOLOGY | Facility: CLINIC | Age: 37
End: 2022-07-19
Payer: COMMERCIAL

## 2022-08-09 ENCOUNTER — PATIENT MESSAGE (OUTPATIENT)
Dept: PHARMACY | Facility: CLINIC | Age: 37
End: 2022-08-09
Payer: COMMERCIAL

## 2022-08-09 ENCOUNTER — SPECIALTY PHARMACY (OUTPATIENT)
Dept: PHARMACY | Facility: CLINIC | Age: 37
End: 2022-08-09
Payer: COMMERCIAL

## 2022-08-09 NOTE — TELEPHONE ENCOUNTER
Specialty Pharmacy - Refill Coordination    Specialty Medication Orders Linked to Encounter    Flowsheet Row Most Recent Value   Medication #1 temozolomide (TEMODAR) 5 MG capsule (Order#[id number], Rx#[id number])   Medication #2 temozolomide (TEMODAR) 20 MG capsule (Order#[id number], Rx#[id number])   Medication #3 temozolomide (TEMODAR) 250 MG capsule (Order#[id number], Rx#[id number])          Refill Questions - Documented Responses    Flowsheet Row Most Recent Value   Patient Availability and HIPAA Verification    Does patient want to proceed with activity? Yes   HIPAA/medical authority confirmed? Yes   Relationship to patient of person spoken to? Self   Refill Screening Questions    Changes to allergies? No   Changes to medications? No   New conditions since last clinic visit? No   Unplanned office visit, urgent care, ED, or hospital admission in the last 4 weeks? No   How does patient/caregiver feel medication is working? Good   Financial problems or insurance changes? No   How many doses of your specialty medications were missed in the last 4 weeks? 0   Would patient like to speak to a pharmacist? No   When does the patient need to receive the medication? 08/17/22   Refill Delivery Questions    How will the patient receive the medication? Delivery Alpa   When does the patient need to receive the medication? 08/17/22   Shipping Address Home   Address in [facility] confirmed and updated if neccessary? Yes   Expected Copay ($) 0   Is the patient able to afford the medication copay? Yes   Payment Method zero copay   Days supply of Refill 28   Supplies needed? No supplies needed   Refill activity completed? Yes   Refill activity plan Refill scheduled   Shipment/Pickup Date: 08/11/22          Current Outpatient Medications   Medication Sig    famotidine (PEPCID) 20 MG tablet Take 1 tablet (20 mg total) by mouth 2 (two) times daily.    indomethacin (INDOCIN) 50 MG capsule Take 1 capsule (50 mg total) by mouth  2 (two) times daily with meals.    levETIRAcetam (KEPPRA) 750 MG Tab Take 1 tablet (750 mg total) by mouth 2 (two) times daily.    ondansetron (ZOFRAN) 8 MG tablet Take 1 tablet (8 mg total) by mouth every 8 (eight) hours as needed for Nausea.    prochlorperazine (COMPAZINE) 10 MG tablet Take 1 tablet (10 mg total) by mouth 3 (three) times daily as needed (use when zofran does not work).    temozolomide (TEMODAR) 20 MG capsule Take 1 capsule (20 mg total) by mouth once daily Take as directed days 1-5 of every 28 days. Take on an empty stomach. with 2 other temozolomide prescriptions for 275 mg total.    temozolomide (TEMODAR) 250 MG capsule Take 1 capsule (250 mg total) by mouth once daily Take as directed days 1-5 of every 28 days. Take on an empty stomach. with 2 other temozolomide prescriptions for 275 mg total.    temozolomide (TEMODAR) 5 MG capsule Take 1 capsule (5 mg total) by mouth once daily Take as directed days 1-5 of every 28 days. Take on an empty stomach. with 2 other temozolomide prescriptions for 275 mg total.   Last reviewed on 6/22/2022 12:32 PM by Chago Van MD    Review of patient's allergies indicates:  No Known Allergies Last reviewed on  6/21/2022 1:27 PM by Lory Ruiz      Tasks added this encounter   9/7/2022 - Refill Call (Auto Added)   Tasks due within next 3 months   10/11/2022 - Clinical - Follow Up Assesement (180 day)     Beverley Lowry, Patient Care Assistant  [facility]  [address] [place], [place] [address]  [facility] [address]  Phone: [phone]  Fax: [phone]

## 2022-08-10 ENCOUNTER — OFFICE VISIT (OUTPATIENT)
Dept: HEMATOLOGY/ONCOLOGY | Facility: CLINIC | Age: 37
End: 2022-08-10
Payer: COMMERCIAL

## 2022-08-10 ENCOUNTER — LAB VISIT (OUTPATIENT)
Dept: LAB | Facility: HOSPITAL | Age: 37
End: 2022-08-10
Attending: STUDENT IN AN ORGANIZED HEALTH CARE EDUCATION/TRAINING PROGRAM
Payer: COMMERCIAL

## 2022-08-10 VITALS
RESPIRATION RATE: 18 BRPM | SYSTOLIC BLOOD PRESSURE: 136 MMHG | WEIGHT: 156.94 LBS | HEIGHT: 66 IN | HEART RATE: 62 BPM | TEMPERATURE: 98 F | BODY MASS INDEX: 25.22 KG/M2 | OXYGEN SATURATION: 97 % | DIASTOLIC BLOOD PRESSURE: 85 MMHG

## 2022-08-10 DIAGNOSIS — C71.9 ASTROCYTOMA BRAIN TUMOR: Primary | ICD-10-CM

## 2022-08-10 DIAGNOSIS — C71.9 ASTROCYTOMA BRAIN TUMOR: ICD-10-CM

## 2022-08-10 DIAGNOSIS — C71.1 MALIGNANT ASTROCYTOMA OF FRONTAL LOBE: ICD-10-CM

## 2022-08-10 DIAGNOSIS — Z79.899 IMMUNODEFICIENCY DUE TO DRUG THERAPY: ICD-10-CM

## 2022-08-10 DIAGNOSIS — D84.821 IMMUNODEFICIENCY DUE TO DRUG THERAPY: ICD-10-CM

## 2022-08-10 PROBLEM — R03.0 ELEVATED BP WITHOUT DIAGNOSIS OF HYPERTENSION: Status: RESOLVED | Noted: 2022-02-11 | Resolved: 2022-08-10

## 2022-08-10 LAB
ALBUMIN SERPL BCP-MCNC: 4.2 G/DL (ref 3.5–5.2)
ALP SERPL-CCNC: 59 U/L (ref 55–135)
ALT SERPL W/O P-5'-P-CCNC: 35 U/L (ref 10–44)
ANION GAP SERPL CALC-SCNC: 8 MMOL/L (ref 8–16)
AST SERPL-CCNC: 22 U/L (ref 10–40)
BASOPHILS # BLD AUTO: 0.04 K/UL (ref 0–0.2)
BASOPHILS NFR BLD: 0.7 % (ref 0–1.9)
BILIRUB SERPL-MCNC: 0.7 MG/DL (ref 0.1–1)
BUN SERPL-MCNC: 9 MG/DL (ref 6–20)
CALCIUM SERPL-MCNC: 9 MG/DL (ref 8.7–10.5)
CHLORIDE SERPL-SCNC: 105 MMOL/L (ref 95–110)
CO2 SERPL-SCNC: 23 MMOL/L (ref 23–29)
CREAT SERPL-MCNC: 1 MG/DL (ref 0.5–1.4)
DIFFERENTIAL METHOD: ABNORMAL
EOSINOPHIL # BLD AUTO: 0.3 K/UL (ref 0–0.5)
EOSINOPHIL NFR BLD: 5.8 % (ref 0–8)
ERYTHROCYTE [DISTWIDTH] IN BLOOD BY AUTOMATED COUNT: 12.3 % (ref 11.5–14.5)
EST. GFR  (NO RACE VARIABLE): >60 ML/MIN/1.73 M^2
GLUCOSE SERPL-MCNC: 110 MG/DL (ref 70–110)
HCT VFR BLD AUTO: 39.1 % (ref 40–54)
HGB BLD-MCNC: 13.9 G/DL (ref 14–18)
IMM GRANULOCYTES # BLD AUTO: 0.01 K/UL (ref 0–0.04)
IMM GRANULOCYTES NFR BLD AUTO: 0.2 % (ref 0–0.5)
LYMPHOCYTES # BLD AUTO: 0.6 K/UL (ref 1–4.8)
LYMPHOCYTES NFR BLD: 10.7 % (ref 18–48)
MCH RBC QN AUTO: 30.3 PG (ref 27–31)
MCHC RBC AUTO-ENTMCNC: 35.5 G/DL (ref 32–36)
MCV RBC AUTO: 85 FL (ref 82–98)
MONOCYTES # BLD AUTO: 0.7 K/UL (ref 0.3–1)
MONOCYTES NFR BLD: 12.3 % (ref 4–15)
NEUTROPHILS # BLD AUTO: 3.9 K/UL (ref 1.8–7.7)
NEUTROPHILS NFR BLD: 70.3 % (ref 38–73)
NRBC BLD-RTO: 0 /100 WBC
PLATELET # BLD AUTO: 218 K/UL (ref 150–450)
PMV BLD AUTO: 10.8 FL (ref 9.2–12.9)
POTASSIUM SERPL-SCNC: 3.9 MMOL/L (ref 3.5–5.1)
PROT SERPL-MCNC: 7 G/DL (ref 6–8.4)
RBC # BLD AUTO: 4.59 M/UL (ref 4.6–6.2)
SODIUM SERPL-SCNC: 136 MMOL/L (ref 136–145)
WBC # BLD AUTO: 5.51 K/UL (ref 3.9–12.7)

## 2022-08-10 PROCEDURE — 3008F BODY MASS INDEX DOCD: CPT | Mod: CPTII,S$GLB,, | Performed by: STUDENT IN AN ORGANIZED HEALTH CARE EDUCATION/TRAINING PROGRAM

## 2022-08-10 PROCEDURE — 1159F MED LIST DOCD IN RCRD: CPT | Mod: CPTII,S$GLB,, | Performed by: STUDENT IN AN ORGANIZED HEALTH CARE EDUCATION/TRAINING PROGRAM

## 2022-08-10 PROCEDURE — 99215 PR OFFICE/OUTPT VISIT, EST, LEVL V, 40-54 MIN: ICD-10-PCS | Mod: S$GLB,,, | Performed by: STUDENT IN AN ORGANIZED HEALTH CARE EDUCATION/TRAINING PROGRAM

## 2022-08-10 PROCEDURE — 3075F PR MOST RECENT SYSTOLIC BLOOD PRESS GE 130-139MM HG: ICD-10-PCS | Mod: CPTII,S$GLB,, | Performed by: STUDENT IN AN ORGANIZED HEALTH CARE EDUCATION/TRAINING PROGRAM

## 2022-08-10 PROCEDURE — 3075F SYST BP GE 130 - 139MM HG: CPT | Mod: CPTII,S$GLB,, | Performed by: STUDENT IN AN ORGANIZED HEALTH CARE EDUCATION/TRAINING PROGRAM

## 2022-08-10 PROCEDURE — 80053 COMPREHEN METABOLIC PANEL: CPT | Performed by: STUDENT IN AN ORGANIZED HEALTH CARE EDUCATION/TRAINING PROGRAM

## 2022-08-10 PROCEDURE — 85025 COMPLETE CBC W/AUTO DIFF WBC: CPT | Performed by: STUDENT IN AN ORGANIZED HEALTH CARE EDUCATION/TRAINING PROGRAM

## 2022-08-10 PROCEDURE — 1159F PR MEDICATION LIST DOCUMENTED IN MEDICAL RECORD: ICD-10-PCS | Mod: CPTII,S$GLB,, | Performed by: STUDENT IN AN ORGANIZED HEALTH CARE EDUCATION/TRAINING PROGRAM

## 2022-08-10 PROCEDURE — 3079F PR MOST RECENT DIASTOLIC BLOOD PRESSURE 80-89 MM HG: ICD-10-PCS | Mod: CPTII,S$GLB,, | Performed by: STUDENT IN AN ORGANIZED HEALTH CARE EDUCATION/TRAINING PROGRAM

## 2022-08-10 PROCEDURE — 99999 PR PBB SHADOW E&M-EST. PATIENT-LVL III: ICD-10-PCS | Mod: PBBFAC,,, | Performed by: STUDENT IN AN ORGANIZED HEALTH CARE EDUCATION/TRAINING PROGRAM

## 2022-08-10 PROCEDURE — 3079F DIAST BP 80-89 MM HG: CPT | Mod: CPTII,S$GLB,, | Performed by: STUDENT IN AN ORGANIZED HEALTH CARE EDUCATION/TRAINING PROGRAM

## 2022-08-10 PROCEDURE — 3008F PR BODY MASS INDEX (BMI) DOCUMENTED: ICD-10-PCS | Mod: CPTII,S$GLB,, | Performed by: STUDENT IN AN ORGANIZED HEALTH CARE EDUCATION/TRAINING PROGRAM

## 2022-08-10 PROCEDURE — 99215 OFFICE O/P EST HI 40 MIN: CPT | Mod: S$GLB,,, | Performed by: STUDENT IN AN ORGANIZED HEALTH CARE EDUCATION/TRAINING PROGRAM

## 2022-08-10 PROCEDURE — 36415 COLL VENOUS BLD VENIPUNCTURE: CPT | Performed by: STUDENT IN AN ORGANIZED HEALTH CARE EDUCATION/TRAINING PROGRAM

## 2022-08-10 PROCEDURE — 99999 PR PBB SHADOW E&M-EST. PATIENT-LVL III: CPT | Mod: PBBFAC,,, | Performed by: STUDENT IN AN ORGANIZED HEALTH CARE EDUCATION/TRAINING PROGRAM

## 2022-08-10 RX ORDER — ONDANSETRON HYDROCHLORIDE 8 MG/1
8 TABLET, FILM COATED ORAL EVERY 8 HOURS PRN
Qty: 30 TABLET | Refills: 3 | Status: SHIPPED | OUTPATIENT
Start: 2022-08-10 | End: 2023-02-07 | Stop reason: SDUPTHER

## 2022-08-10 NOTE — ASSESSMENT & PLAN NOTE
S/p cycle 1.  Side effect of nausea and constipation but both were manageable.  Ran out of zofran and has been using compazine pre-medication but finds himself sometimes waking up with nausea.  No significant fatigue. No bruising or bleeding.  -labs reviewed; ok to start cycle 2 8/16/22  -schedule labs and MRI 9/19/22 and he will follow up with myself, rad onc, and neurosurgery the following day.

## 2022-08-10 NOTE — PROGRESS NOTES
PATIENT: Дмитрий Winn  MRN: [id number]  DATE: 8/10/2022      Diagnosis:   1. Astrocytoma brain tumor    2. Immunodeficiency due to drug therapy    3. Malignant astrocytoma of frontal lobe        Chief Complaint: Malignant astrocytoma of frontal lobe      Oncologic History:    Oncologic History 1. Astrocytoma, IDH mutated    Oncologic Treatment 1. 8/2018 STR  2. 2/17/22 STR  3. 5/9/22-6/20/22 chemoradiation with temozolomide    Pathology 1. 8/10/2018. Pathology: Diffuse Astrocytoma IDH1 and ATRX mutated. P53 over expressed, WHO Grade II  Genomic alterations include loss of 2q37.1q37.3, gain of 7q22.1q36.3, gain of  11q23.3q24.1, copy neutral loss of heterozygosity (cnLOH) of 17p13.3p11.1 (including TP53), gain of 18p11.32p11.21, gain of 18q21.2q23, and gain of 19p13.3p11 (including PTBP1).          Subjective:    Interval History: Mr. Winn is here for follow up.  He is a former patient of Dr. Coronado.    36 year old man with history of epilepsy and initial diagnosis of astrocytoma, WHO grade II s/p STR 8/2018.  When he was first diagnosed, he presented with a seizure to [facility] 8/2018.  He was followed with surveillance with recent MRI 1/29/22 showing interval growth of the right frontal lesion with mild increased mass effect; patient at his follow up visit with Dr. Van was asymptomatic at the time.  Decision was made to perform craniotomy.  2/17/22 he had a STR and final pathology showed WHO grade II astrocytoma, IDH mutated. 5/9/22 he started chemoradiation and finished on 6/20/22.  ---  Overall did well.  Had a few episodes of nausea in the middle of the night.  Ran out of zofran so he's been using compazine.  Constipation manageable.  Denies any fatigue or interference with this work.  No new focal neuro symptoms to report today.  He continues to work full-time without difficulty. Denies any recurrent seizures.    He currently works as a del real. He lives on the [place].       He is alone at this visit today.    Past Medical History:   Past Medical History:   Diagnosis Date    Ataxia 5/29/2020    Cancer     brain tumor    Epilepsy     Normocytic anemia 3/8/2022       Past Surgical HIstory:   Past Surgical History:   Procedure Laterality Date    CRANIOTOMY FOR EXCISION OF INTRACRANIAL TUMOR Right 8/10/2018    Procedure: CRANIOTOMY, FOR INTRACRANIAL NEOPLASM EXCISION Right eyebrow incision / [facility] craniotomy for tumor resection;  Surgeon: Lefty Paulson MD;  [facility];  Service: Neurosurgery;  Laterality: Right;    CRANIOTOMY USING FRAMELESS STEREOTAXY Right 2/17/2022    Procedure: CRANIOTOMY, USING FRAMELESS STEREOTAXY;  Surgeon: Chago Van MD;  [facility];  Service: Neurosurgery;  Laterality: Right;    cyst removed      SURGICAL REMOVAL OF PILONIDAL CYST         Family History:   Family History   Problem Relation Age of Onset    Cancer Maternal Grandfather     Prostate cancer Maternal Grandfather     Cancer Paternal Grandmother     Breast cancer Paternal Grandmother     Hypertension Mother     No Known Problems Sister     No Known Problems Brother        Social History:  reports that he quit smoking about 3 years ago. His smoking use included cigarettes. He started smoking about 12 years ago. He has a 8.00 pack-year smoking history. He has never used smokeless tobacco. He reports current alcohol use of about 42.0 standard drinks of alcohol per week. He reports that he does not use drugs.    Allergies:  Review of patient's allergies indicates:  No Known Allergies    Medications:  Current Outpatient Medications   Medication Sig Dispense Refill    indomethacin (INDOCIN) 50 MG capsule Take 1 capsule (50 mg total) by mouth 2 (two) times daily with meals. 90 capsule 0    levETIRAcetam (KEPPRA) 750 MG Tab Take 1 tablet (750 mg total) by mouth 2 (two) times daily. 60 tablet 11    prochlorperazine (COMPAZINE) 10 MG tablet Take 1 tablet  (10 mg total) by mouth 3 (three) times daily as needed (use when zofran does not work). 30 tablet 3    temozolomide (TEMODAR) 20 MG capsule Take 1 capsule (20 mg total) by mouth once daily Take as directed days 1-5 of every 28 days. Take on an empty stomach. with 2 other temozolomide prescriptions for 275 mg total. 5 capsule 11    temozolomide (TEMODAR) 250 MG capsule Take 1 capsule (250 mg total) by mouth once daily Take as directed days 1-5 of every 28 days. Take on an empty stomach. with 2 other temozolomide prescriptions for 275 mg total. 5 capsule 11    temozolomide (TEMODAR) 5 MG capsule Take 1 capsule (5 mg total) by mouth once daily Take as directed days 1-5 of every 28 days. Take on an empty stomach. with 2 other temozolomide prescriptions for 275 mg total. 5 capsule 11    famotidine (PEPCID) 20 MG tablet Take 1 tablet (20 mg total) by mouth 2 (two) times daily. 60 tablet 0    ondansetron (ZOFRAN) 8 MG tablet Take 1 tablet (8 mg total) by mouth every 8 (eight) hours as needed for Nausea. 30 tablet 3     No current facility-administered medications for this visit.       Review of Systems   Constitutional: Positive for fatigue. Negative for activity change, appetite change, chills, diaphoresis, fever and unexpected weight change.   HENT: Negative for nosebleeds and trouble swallowing.    Eyes: Negative for visual disturbance.   Respiratory: Negative for cough, chest tightness, shortness of breath and wheezing.    Cardiovascular: Negative for chest pain and leg swelling.   Gastrointestinal: Positive for nausea. Negative for abdominal distention, abdominal pain, blood in stool, constipation, diarrhea and vomiting.   Endocrine: Negative for cold intolerance and heat intolerance.   Genitourinary: Negative for difficulty urinating and dysuria.   Musculoskeletal: Negative for arthralgias and back pain.   Skin: Negative for color change.   Neurological: Negative for dizziness, seizures, weakness,  "light-headedness, numbness and headaches.   Hematological: Negative for adenopathy. Does not bruise/bleed easily.   Psychiatric/Behavioral: Negative for confusion and sleep disturbance.       ECOG Performance Status:     ECOG SCORE    0 - Fully active-able to carry on all pre-disease performance without restriction         Objective:      Vitals:   Vitals:    08/10/22 1257   BP: 136/85   BP Location: Right arm   Patient Position: Sitting   BP Method: Medium (Automatic)   Pulse: 62   Resp: 18   Temp: 98.3 °F (36.8 °C)   TempSrc: Oral   SpO2: 97%   Weight: 71.2 kg (156 lb 15.5 oz)   Height: 5' 6" (1.676 m)     BMI: Body mass index is 25.34 kg/m².       Physical Exam  Constitutional:       General: He is not in acute distress.     Appearance: Normal appearance. He is not ill-appearing.   HENT:      Head: Normocephalic.      Nose: Nose normal.      Mouth/Throat:      Pharynx: No oropharyngeal exudate or posterior oropharyngeal erythema.   Eyes:      General: No scleral icterus.     Extraocular Movements: Extraocular movements intact.      Conjunctiva/sclera: Conjunctivae normal.      Pupils: Pupils are equal, round, and reactive to light.   Cardiovascular:      Rate and Rhythm: Normal rate and regular rhythm.      Heart sounds: No murmur heard.    No friction rub. No gallop.   Pulmonary:      Effort: Pulmonary effort is normal. No respiratory distress.      Breath sounds: No stridor. No wheezing, rhonchi or rales.   Abdominal:      General: Bowel sounds are normal. There is no distension.      Palpations: Abdomen is soft. There is no mass.      Tenderness: There is no abdominal tenderness. There is no guarding or rebound.   Musculoskeletal:         General: Normal range of motion.      Cervical back: Normal range of motion and neck supple.      Right lower leg: No edema.      Left lower leg: No edema.   Skin:     General: Skin is warm and dry.   Neurological:      General: No focal deficit present.      Mental Status: " He is alert.         Laboratory Data:   Recent Results (from the past 168 hour(s))   Comprehensive Metabolic Panel    Collection Time: 08/10/22 11:53 AM   Result Value Ref Range    Sodium 136 136 - 145 mmol/L    Potassium 3.9 3.5 - 5.1 mmol/L    Chloride 105 95 - 110 mmol/L    CO2 23 23 - 29 mmol/L    Glucose 110 70 - 110 mg/dL    BUN 9 6 - 20 mg/dL    Creatinine 1.0 0.5 - 1.4 mg/dL    Calcium 9.0 8.7 - 10.5 mg/dL    Total Protein 7.0 6.0 - 8.4 g/dL    Albumin 4.2 3.5 - 5.2 g/dL    Total Bilirubin 0.7 0.1 - 1.0 mg/dL    Alkaline Phosphatase 59 55 - 135 U/L    AST 22 10 - 40 U/L    ALT 35 10 - 44 U/L    Anion Gap 8 8 - 16 mmol/L    eGFR >60.0 >60 mL/min/1.73 m^2   CBC Auto Differential    Collection Time: 08/10/22 11:53 AM   Result Value Ref Range    WBC 5.51 3.90 - 12.70 K/uL    RBC 4.59 (L) 4.60 - 6.20 M/uL    Hemoglobin 13.9 (L) 14.0 - 18.0 g/dL    Hematocrit 39.1 (L) 40.0 - 54.0 %    MCV 85 82 - 98 fL    MCH 30.3 27.0 - 31.0 pg    MCHC 35.5 32.0 - 36.0 g/dL    RDW 12.3 11.5 - 14.5 %    Platelets 218 150 - 450 K/uL    MPV 10.8 9.2 - 12.9 fL    Immature Granulocytes 0.2 0.0 - 0.5 %    Gran # (ANC) 3.9 1.8 - 7.7 K/uL    Immature Grans (Abs) 0.01 0.00 - 0.04 K/uL    Lymph # 0.6 (L) 1.0 - 4.8 K/uL    Mono # 0.7 0.3 - 1.0 K/uL    Eos # 0.3 0.0 - 0.5 K/uL    Baso # 0.04 0.00 - 0.20 K/uL    nRBC 0 0 /100 WBC    Gran % 70.3 38.0 - 73.0 %    Lymph % 10.7 (L) 18.0 - 48.0 %    Mono % 12.3 4.0 - 15.0 %    Eosinophil % 5.8 0.0 - 8.0 %    Basophil % 0.7 0.0 - 1.9 %    Differential Method Automated      Lab Results   Component Value Date    WBC 5.51 08/10/2022    HGB 13.9 (L) 08/10/2022    HCT 39.1 (L) 08/10/2022    MCV 85 08/10/2022     08/10/2022       BMP  Lab Results   Component Value Date     08/10/2022    K 3.9 08/10/2022     08/10/2022    CO2 23 08/10/2022    BUN 9 08/10/2022    CREATININE 1.0 08/10/2022    CALCIUM 9.0 08/10/2022    ANIONGAP 8 08/10/2022    ESTGFRAFRICA >60.0 07/18/2022    EGFRNONAA  >60.0 07/18/2022         Imaging:     MRI Brain W WO Contrast    Result Date: 6/21/2022  EXAMINATION: MRI BRAIN W WO CONTRAST CLINICAL HISTORY: brain mass; Malignant neoplasm of brain, unspecified TECHNIQUE: Multiplanar multisequence MR imaging of the brain was performed before and after the administration of  mL Gadavist intravenous contrast. COMPARISON: Prior studies with the most recent dated 04/28/2022 FINDINGS: There has been further evolution of the right frontal operative bed resection cavity particularly along the dependent posterior margin.   No evidence of new signal abnormality or nodular enhancement is identified. No midline shift herniation or new acute intracranial process. Right frontal developmental venous anomaly again noted. Normal arterial flow voids are preserved at the skull base. The visualized sinuses and mastoid air cells are clear.     Further vaulting postsurgical changes.  No new enhancing lesion or evidence of neoplastic progression. Electronically signed by: Ayaz Hughes Date:    06/21/2022 Time:    13:59      Assessment:       1. Astrocytoma brain tumor    2. Immunodeficiency due to drug therapy    3. Malignant astrocytoma of frontal lobe           Plan:       Problem List Items Addressed This Visit        Immunology/Multi System    Immunodeficiency due to drug therapy    Current Assessment & Plan     Afebrile, ANC sufficient for treatment  -monitor cbc              Oncology    Malignant astrocytoma of frontal lobe    Overview     2/17/22 STR for recurrent WHO grade II astrocytoma, IDH mutated.  5/9/22-6/20/22 completed chemoradiation with temozolomide  7/19/22 started maintenance temozolomide           Current Assessment & Plan     S/p cycle 1.  Side effect of nausea and constipation but both were manageable.  Ran out of zofran and has been using compazine pre-medication but finds himself sometimes waking up with nausea.  No significant fatigue. No bruising or bleeding.  -labs  reviewed; ok to start cycle 2 8/16/22  -schedule labs and MRI 9/19/22 and he will follow up with myself, rad onc, and neurosurgery the following day.           Relevant Medications    ondansetron (ZOFRAN) 8 MG tablet    Astrocytoma brain tumor - Primary    Relevant Medications    ondansetron (ZOFRAN) 8 MG tablet    Other Relevant Orders    MRI Brain Perfusion          Orders Placed This Encounter   Procedures    MRI Brain Perfusion     Route Chart for Scheduling    Med Onc Chart Routing  Urgent    Follow up with physician . 9/20 after he sees dr. conn    Follow up with DARA    Infusion scheduling note    Injection scheduling note    Labs CMP and CBC   Lab interval:  9/19 cmp cbc   Imaging MRI   9/19 mri brain with perfusion (main campus only)   Pharmacy appointment No pharmacy appointment needed      Other referrals No additional referrals needed       Oral chemo, does not need to be scheduled.  Treatment Plan Information   OP TEMOZOLOMIDE D1-5 Q4W   Sha Pan MD   Upcoming Treatment Dates - OP TEMOZOLOMIDE D1-5 Q4W    No upcoming days in selected categories.          Sha Pan MD  Hematology Oncology

## 2022-09-07 ENCOUNTER — SPECIALTY PHARMACY (OUTPATIENT)
Dept: PHARMACY | Facility: CLINIC | Age: 37
End: 2022-09-07
Payer: COMMERCIAL

## 2022-09-07 NOTE — TELEPHONE ENCOUNTER
Specialty Pharmacy - Refill Coordination    Specialty Medication Orders Linked to Encounter      Flowsheet Row Most Recent Value   Medication #1 temozolomide (TEMODAR) 5 MG capsule (Order#[id number], Rx#[id number])   Medication #2 temozolomide (TEMODAR) 20 MG capsule (Order#[id number], Rx#[id number])   Medication #3 temozolomide (TEMODAR) 250 MG capsule (Order#[id number], Rx#[id number])            Refill Questions - Documented Responses      Flowsheet Row Most Recent Value   Patient Availability and HIPAA Verification    Does patient want to proceed with activity? Yes   HIPAA/medical authority confirmed? Yes   Relationship to patient of person spoken to? Self   Refill Screening Questions    Changes to allergies? No   Changes to medications? No   New conditions since last clinic visit? No   Unplanned office visit, urgent care, ED, or hospital admission in the last 4 weeks? No   How does patient/caregiver feel medication is working? Good   Financial problems or insurance changes? No   How many doses of your specialty medications were missed in the last 4 weeks? 0   Would patient like to speak to a pharmacist? No   When does the patient need to receive the medication? 09/14/22   Refill Delivery Questions    How will the patient receive the medication? Delivery Alpa   When does the patient need to receive the medication? 09/14/22   Shipping Address Home   Address in [facility] confirmed and updated if neccessary? Yes   Expected Copay ($) 0   Is the patient able to afford the medication copay? Yes   Payment Method zero copay   Days supply of Refill 28   Supplies needed? No supplies needed   Refill activity completed? Yes   Refill activity plan Refill scheduled   Shipment/Pickup Date: 09/12/22            Current Outpatient Medications   Medication Sig    famotidine (PEPCID) 20 MG tablet Take 1 tablet (20 mg total) by mouth 2 (two) times daily.    indomethacin (INDOCIN) 50 MG capsule Take 1 capsule (50 mg total) by  mouth 2 (two) times daily with meals.    levETIRAcetam (KEPPRA) 750 MG Tab Take 1 tablet (750 mg total) by mouth 2 (two) times daily.    ondansetron (ZOFRAN) 8 MG tablet Take 1 tablet (8 mg total) by mouth every 8 (eight) hours as needed for Nausea.    prochlorperazine (COMPAZINE) 10 MG tablet Take 1 tablet (10 mg total) by mouth 3 (three) times daily as needed (use when zofran does not work).    temozolomide (TEMODAR) 20 MG capsule Take 1 capsule (20 mg total) by mouth once daily Take as directed days 1-5 of every 28 days. Take on an empty stomach. with 2 other temozolomide prescriptions for 275 mg total.    temozolomide (TEMODAR) 250 MG capsule Take 1 capsule (250 mg total) by mouth once daily Take as directed days 1-5 of every 28 days. Take on an empty stomach. with 2 other temozolomide prescriptions for 275 mg total.    temozolomide (TEMODAR) 5 MG capsule Take 1 capsule (5 mg total) by mouth once daily Take as directed days 1-5 of every 28 days. Take on an empty stomach. with 2 other temozolomide prescriptions for 275 mg total.   Last reviewed on 8/10/2022  1:08 PM by Lory Ruiz MA    Review of patient's allergies indicates:  No Known Allergies Last reviewed on  8/10/2022 1:07 PM by Lory Ruiz      Tasks added this encounter   10/5/2022 - Refill Call (Auto Added)   Tasks due within next 3 months   10/11/2022 - Clinical - Follow Up Assesement (180 day)     Kiara Archibald [facility]  [address] [place] [id number]  Phone: [phone]  Fax: [phone]

## 2022-09-19 ENCOUNTER — TELEPHONE (OUTPATIENT)
Dept: NEUROSURGERY | Facility: CLINIC | Age: 37
End: 2022-09-19
Payer: COMMERCIAL

## 2022-09-19 ENCOUNTER — HOSPITAL ENCOUNTER (OUTPATIENT)
Dept: RADIOLOGY | Facility: HOSPITAL | Age: 37
Discharge: HOME | End: 2022-09-19
Attending: STUDENT IN AN ORGANIZED HEALTH CARE EDUCATION/TRAINING PROGRAM
Payer: COMMERCIAL

## 2022-09-19 DIAGNOSIS — C71.9 ASTROCYTOMA BRAIN TUMOR: ICD-10-CM

## 2022-09-19 PROCEDURE — 25500020 PHARM REV CODE 255: Performed by: STUDENT IN AN ORGANIZED HEALTH CARE EDUCATION/TRAINING PROGRAM

## 2022-09-19 PROCEDURE — 70553 MRI BRAIN (TUMOR WITH PERFUSION) W W/O CONTRAST (XPD): ICD-10-PCS | Mod: 26,,, | Performed by: RADIOLOGY

## 2022-09-19 PROCEDURE — A9585 GADOBUTROL INJECTION: HCPCS | Performed by: STUDENT IN AN ORGANIZED HEALTH CARE EDUCATION/TRAINING PROGRAM

## 2022-09-19 PROCEDURE — 70553 MRI BRAIN STEM W/O & W/DYE: CPT | Mod: 26,,, | Performed by: RADIOLOGY

## 2022-09-19 PROCEDURE — 70553 MRI BRAIN STEM W/O & W/DYE: CPT | Mod: TC

## 2022-09-19 RX ORDER — GADOBUTROL 604.72 MG/ML
10 INJECTION INTRAVENOUS
Status: COMPLETED | OUTPATIENT
Start: 2022-09-19 | End: 2022-09-19

## 2022-09-19 RX ADMIN — GADOBUTROL 10 ML: 604.72 INJECTION INTRAVENOUS at 02:09

## 2022-09-19 NOTE — TELEPHONE ENCOUNTER
Pt scheduled for 9/27 at 9am. Pt will be seen by Dr. Van and Dr. Hodges on 9/20. Pt is a previous patient of Dr. Pan

## 2022-09-20 ENCOUNTER — OFFICE VISIT (OUTPATIENT)
Dept: RADIATION ONCOLOGY | Facility: CLINIC | Age: 37
End: 2022-09-20
Payer: COMMERCIAL

## 2022-09-20 ENCOUNTER — OFFICE VISIT (OUTPATIENT)
Dept: NEUROSURGERY | Facility: CLINIC | Age: 37
End: 2022-09-20
Payer: COMMERCIAL

## 2022-09-20 VITALS
HEART RATE: 70 BPM | TEMPERATURE: 97 F | OXYGEN SATURATION: 98 % | SYSTOLIC BLOOD PRESSURE: 138 MMHG | BODY MASS INDEX: 24.91 KG/M2 | DIASTOLIC BLOOD PRESSURE: 89 MMHG | WEIGHT: 155 LBS | RESPIRATION RATE: 16 BRPM | HEIGHT: 66 IN

## 2022-09-20 VITALS
SYSTOLIC BLOOD PRESSURE: 138 MMHG | BODY MASS INDEX: 24.75 KG/M2 | HEIGHT: 66 IN | TEMPERATURE: 98 F | DIASTOLIC BLOOD PRESSURE: 89 MMHG | HEART RATE: 70 BPM | WEIGHT: 154 LBS | OXYGEN SATURATION: 98 %

## 2022-09-20 DIAGNOSIS — C71.9 ASTROCYTOMA: Primary | ICD-10-CM

## 2022-09-20 DIAGNOSIS — C71.1 MALIGNANT ASTROCYTOMA OF FRONTAL LOBE: Primary | ICD-10-CM

## 2022-09-20 PROCEDURE — 99999 PR PBB SHADOW E&M-EST. PATIENT-LVL IV: ICD-10-PCS | Mod: PBBFAC,,, | Performed by: RADIOLOGY

## 2022-09-20 PROCEDURE — 99024 PR POST-OP FOLLOW-UP VISIT: ICD-10-PCS | Mod: S$GLB,,, | Performed by: RADIOLOGY

## 2022-09-20 PROCEDURE — 99999 PR PBB SHADOW E&M-EST. PATIENT-LVL III: CPT | Mod: PBBFAC,,, | Performed by: NEUROLOGICAL SURGERY

## 2022-09-20 PROCEDURE — 1160F PR REVIEW ALL MEDS BY PRESCRIBER/CLIN PHARMACIST DOCUMENTED: ICD-10-PCS | Mod: CPTII,S$GLB,, | Performed by: NEUROLOGICAL SURGERY

## 2022-09-20 PROCEDURE — 99999 PR PBB SHADOW E&M-EST. PATIENT-LVL IV: CPT | Mod: PBBFAC,,, | Performed by: RADIOLOGY

## 2022-09-20 PROCEDURE — 3075F PR MOST RECENT SYSTOLIC BLOOD PRESS GE 130-139MM HG: ICD-10-PCS | Mod: CPTII,S$GLB,, | Performed by: NEUROLOGICAL SURGERY

## 2022-09-20 PROCEDURE — 3079F DIAST BP 80-89 MM HG: CPT | Mod: CPTII,S$GLB,, | Performed by: RADIOLOGY

## 2022-09-20 PROCEDURE — 3079F PR MOST RECENT DIASTOLIC BLOOD PRESSURE 80-89 MM HG: ICD-10-PCS | Mod: CPTII,S$GLB,, | Performed by: NEUROLOGICAL SURGERY

## 2022-09-20 PROCEDURE — 3075F SYST BP GE 130 - 139MM HG: CPT | Mod: CPTII,S$GLB,, | Performed by: RADIOLOGY

## 2022-09-20 PROCEDURE — 1159F MED LIST DOCD IN RCRD: CPT | Mod: CPTII,S$GLB,, | Performed by: NEUROLOGICAL SURGERY

## 2022-09-20 PROCEDURE — 1159F PR MEDICATION LIST DOCUMENTED IN MEDICAL RECORD: ICD-10-PCS | Mod: CPTII,S$GLB,, | Performed by: RADIOLOGY

## 2022-09-20 PROCEDURE — 3075F PR MOST RECENT SYSTOLIC BLOOD PRESS GE 130-139MM HG: ICD-10-PCS | Mod: CPTII,S$GLB,, | Performed by: RADIOLOGY

## 2022-09-20 PROCEDURE — 99024 POSTOP FOLLOW-UP VISIT: CPT | Mod: S$GLB,,, | Performed by: RADIOLOGY

## 2022-09-20 PROCEDURE — 3008F BODY MASS INDEX DOCD: CPT | Mod: CPTII,S$GLB,, | Performed by: RADIOLOGY

## 2022-09-20 PROCEDURE — 99999 PR PBB SHADOW E&M-EST. PATIENT-LVL III: ICD-10-PCS | Mod: PBBFAC,,, | Performed by: NEUROLOGICAL SURGERY

## 2022-09-20 PROCEDURE — 1160F RVW MEDS BY RX/DR IN RCRD: CPT | Mod: CPTII,S$GLB,, | Performed by: NEUROLOGICAL SURGERY

## 2022-09-20 PROCEDURE — 99214 OFFICE O/P EST MOD 30 MIN: CPT | Mod: S$GLB,,, | Performed by: NEUROLOGICAL SURGERY

## 2022-09-20 PROCEDURE — 1159F MED LIST DOCD IN RCRD: CPT | Mod: CPTII,S$GLB,, | Performed by: RADIOLOGY

## 2022-09-20 PROCEDURE — 3008F BODY MASS INDEX DOCD: CPT | Mod: CPTII,S$GLB,, | Performed by: NEUROLOGICAL SURGERY

## 2022-09-20 PROCEDURE — 1159F PR MEDICATION LIST DOCUMENTED IN MEDICAL RECORD: ICD-10-PCS | Mod: CPTII,S$GLB,, | Performed by: NEUROLOGICAL SURGERY

## 2022-09-20 PROCEDURE — 99214 PR OFFICE/OUTPT VISIT, EST, LEVL IV, 30-39 MIN: ICD-10-PCS | Mod: S$GLB,,, | Performed by: NEUROLOGICAL SURGERY

## 2022-09-20 PROCEDURE — 3008F PR BODY MASS INDEX (BMI) DOCUMENTED: ICD-10-PCS | Mod: CPTII,S$GLB,, | Performed by: RADIOLOGY

## 2022-09-20 PROCEDURE — 3079F PR MOST RECENT DIASTOLIC BLOOD PRESSURE 80-89 MM HG: ICD-10-PCS | Mod: CPTII,S$GLB,, | Performed by: RADIOLOGY

## 2022-09-20 PROCEDURE — 3079F DIAST BP 80-89 MM HG: CPT | Mod: CPTII,S$GLB,, | Performed by: NEUROLOGICAL SURGERY

## 2022-09-20 PROCEDURE — 3008F PR BODY MASS INDEX (BMI) DOCUMENTED: ICD-10-PCS | Mod: CPTII,S$GLB,, | Performed by: NEUROLOGICAL SURGERY

## 2022-09-20 PROCEDURE — 3075F SYST BP GE 130 - 139MM HG: CPT | Mod: CPTII,S$GLB,, | Performed by: NEUROLOGICAL SURGERY

## 2022-09-20 NOTE — PATIENT INSTRUCTIONS
I have personally reviewed the MRI tumor wit perfusion with the pt which shows essentially stable appearance is patient with prior right frontal astrocytoma resection.  Residual T2/FLAIR hyperintensity along the margins of the resection cavity, suspicious for residual tumor.  No interval growth.  No new edema, enhancement, or hyper perfusion. No new lesions elsewhere.    I will schedule the patient for 3 month follow up with MRI brain.

## 2022-09-20 NOTE — PROGRESS NOTES
Subjective:   I, Colleen Romero, attest that this documentation has been prepared under the direction and in the presence of Chago Van MD.     Patient ID: Дмитрий Winn is a 37 y.o. male     Chief Complaint: No chief complaint on file.          HPI  Mr. Дмитрий Winn is a 37 y.o. gentleman with history of epilepsy, right intracranial craniotomy (8/10/2018) performed by Lefty Paulson MD, pathology reported Astrocytoma WHO Grade II, and s/p right frontal craniotomy for astrocytoma done on 2/17/2022, who presents today for 3 month follow up astrocytoma with MRI brain. This is a pt who presented to [facility] ED on 8/8/2018, for a seizure. Neuroimaging showed a large frontal mass with an unusual appearance suspicious for low grade glioma or neuroglial cyst. A right supraorbital frontal craniotomy and resection of gyrus rectus tumor was done on 8/10/2018. 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 Pt continued to be monitored and followed up with MRI brain frequently. The pt presented to me on 3/9/21 for a recent MRI brain that revealed increased in size glioma. At the time of our last visit on 6/21/2022, the patient reports his energy levels are back to normal. Denies any new symptoms.    Today the pt reports he is doing well in the interim. Patient has returned to work and is staying active at home. He states his current regimen is tolerable. Notes of some fatigue following his current dose but states this is manageable. Patient denies any nausea or vomiting. He does state one episode of vomiting but he attributes this to missing his dose of Temozolomide for the day. Otherwise, pt has no new complaints or concerns at this time.    Review of Systems   Constitutional:  Negative for activity change, appetite change, fatigue, fever and unexpected weight change.   HENT:  Negative for facial swelling.    Eyes: Negative.    Respiratory: Negative.     Cardiovascular: Negative.     Gastrointestinal:  Negative for diarrhea, nausea and vomiting.   Endocrine: Negative.    Genitourinary: Negative.    Musculoskeletal:  Negative for back pain, joint swelling, myalgias and neck pain.   Neurological:  Negative for dizziness, seizures, weakness, numbness and headaches.   Psychiatric/Behavioral: Negative.        Past Medical History:   Diagnosis Date    Ataxia 5/29/2020    Cancer     brain tumor    Epilepsy     Normocytic anemia 3/8/2022       Objective:      Vitals:    09/20/22 1112   BP: 138/89   Pulse: 70   Temp: 97.9 °F (36.6 °C)      Physical Exam  Constitutional:       General: He is not in acute distress.     Appearance: Normal appearance.   HENT:      Head: Normocephalic and atraumatic.   Pulmonary:      Effort: Pulmonary effort is normal.   Musculoskeletal:      Cervical back: Neck supple.   Neurological:      Mental Status: He is alert and oriented to person, place, and time.      GCS: GCS eye subscore is 4. GCS verbal subscore is 5. GCS motor subscore is 6.      Cranial Nerves: No cranial nerve deficit.     IMAGING:  MRI Brain (Tumor with Perfusion) (9/19/2022):  Essentially stable appearance is patient with prior right frontal astrocytoma resection.  Residual T2/FLAIR hyperintensity along the margins of the resection cavity, suspicious for residual tumor.  No interval growth.  No new edema, enhancement, or hyper perfusion. No new lesions elsewhere.    I have personally reviewed the images with the pt.      I, Dr. Chago Van, personally performed the services described in this documentation. All medical record entries made by the scribe, Colleen Romero, were at my direction and in my presence.  I have reviewed the chart and agree that the record reflects my personal performance and is accurate and complete. Chago Van MD. 09/20/2022    Assessment:     Astrocytoma.     Plan:   I have personally reviewed the MRI tumor wit perfusion with the pt which shows essentially stable appearance is  patient with prior right frontal astrocytoma resection.  Residual T2/FLAIR hyperintensity along the margins of the resection cavity, suspicious for residual tumor.  No interval growth.  No new edema, enhancement, or hyper perfusion. No new lesions elsewhere.    I will schedule the patient for 3 month follow up with MRI brain.

## 2022-09-20 NOTE — PROGRESS NOTES
09/20/2022    Radiation Oncology Follow-Up Visit      Prior Radiation History:    Site  Technique  Energy  Dose/Fx (Gy)  #Fx  Total Dose (Gy)  Start Date  End Date  Elapsed Days    Rt Frontal  VMAT  6X  1.8  30 / 30  54  5/9/2022 6/20/2022  42        Is the patient female between ages 15-55:  no    Does the patient have a CIED:  no      Assessment   This is a 37 y.o. 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 Paulson in 8/2018 with recommendation made for observation at that time. He had gradual increase in size of Right frontal mass over 7313-5032 and underwent STR by Dr. Van 2/17/22. He completed adjuvant chemo-RT to 54 Gy in 30 fx on 6/20/22.     He is doing well since radiation. On maintenance TMZ and reports fatigue after each cycle, but otherwise tolerating well. MRI Brain w/ perfusion 9/19/22 without evidence of active/progressive disease. Stable FLAIR signal adjacent to cavity.           Plan   1) F/U with Dr. Van today; re-staging scans per Dr. Van.   2) Patient will establish care with Dr. Stoddard next week to continue on TMZ.   3) I will see him back in 1 year or sooner PRN.          CHIEF COMPLAINT: F/U after radiation for low grade glioma    HPI: HPI      Past Medical History:   Diagnosis Date    Ataxia 5/29/2020    Cancer     brain tumor    Epilepsy     Normocytic anemia 3/8/2022       Past Surgical History:   Procedure Laterality Date    CRANIOTOMY FOR EXCISION OF INTRACRANIAL TUMOR Right 8/10/2018    Procedure: CRANIOTOMY, FOR INTRACRANIAL NEOPLASM EXCISION Right eyebrow incision / LAYNE BrainPath craniotomy for tumor resection;  Surgeon: Lefty Paulson MD;  Location: [facility] OR;  Service: Neurosurgery;  Laterality: Right;    CRANIOTOMY USING FRAMELESS STEREOTAXY Right 2/17/2022    Procedure: CRANIOTOMY, USING FRAMELESS STEREOTAXY;  Surgeon: Chago Van MD;  Location: [facility] OR [address] [place] [address];  Service: Neurosurgery;  Laterality: Right;    cyst removed       SURGICAL REMOVAL OF PILONIDAL CYST         Social History     Tobacco Use    Smoking status: Former     Packs/day: 1.00     Years: 8.00     Pack years: 8.00     Types: Cigarettes     Start date:      Quit date: 2018     Years since quittin.1    Smokeless tobacco: Never   Substance Use Topics    Alcohol use: Yes     Alcohol/week: 42.0 standard drinks     Types: 42 Cans of beer per week     Comment: 6 pack daily     Drug use: No       Cancer-related family history includes Breast cancer in his paternal grandmother; Cancer in his maternal grandfather and paternal grandmother; Prostate cancer in his maternal grandfather.    Current Outpatient Medications on File Prior to Visit   Medication Sig Dispense Refill    famotidine (PEPCID) 20 MG tablet Take 1 tablet (20 mg total) by mouth 2 (two) times daily. 60 tablet 0    indomethacin (INDOCIN) 50 MG capsule Take 1 capsule (50 mg total) by mouth 2 (two) times daily with meals. 90 capsule 0    levETIRAcetam (KEPPRA) 750 MG Tab Take 1 tablet (750 mg total) by mouth 2 (two) times daily. 60 tablet 11    ondansetron (ZOFRAN) 8 MG tablet Take 1 tablet (8 mg total) by mouth every 8 (eight) hours as needed for Nausea. 30 tablet 3    prochlorperazine (COMPAZINE) 10 MG tablet Take 1 tablet (10 mg total) by mouth 3 (three) times daily as needed (use when zofran does not work). 30 tablet 3    temozolomide (TEMODAR) 20 MG capsule Take 1 capsule (20 mg total) by mouth once daily Take as directed days 1-5 of every 28 days. Take on an empty stomach. with 2 other temozolomide prescriptions for 275 mg total. 5 capsule 11    temozolomide (TEMODAR) 250 MG capsule Take 1 capsule (250 mg total) by mouth once daily Take as directed days 1-5 of every 28 days. Take on an empty stomach. with 2 other temozolomide prescriptions for 275 mg total. 5 capsule 11    temozolomide (TEMODAR) 5 MG capsule Take 1 capsule (5 mg total) by mouth once daily Take as directed days 1-5 of every 28 days.  "Take on an empty stomach. with 2 other temozolomide prescriptions for 275 mg total. 5 capsule 11     Current Facility-Administered Medications on File Prior to Visit   Medication Dose Route Frequency Provider Last Rate Last Admin    [COMPLETED] gadobutroL (GADAVIST) injection 10 mL  10 mL Intravenous ONCE PRN Sha Pan MD   10 mL at 09/19/22 1417       Review of patient's allergies indicates:  No Known Allergies    Review of Systems   Constitutional:  Positive for malaise/fatigue. Negative for fever and weight loss.   HENT:  Negative for ear pain and sore throat.    Eyes:  Negative for blurred vision and double vision.   Respiratory:  Negative for cough, hemoptysis and shortness of breath.    Cardiovascular:  Negative for chest pain and leg swelling.   Gastrointestinal:  Negative for abdominal pain, constipation, diarrhea, heartburn and nausea.   Genitourinary:  Negative for dysuria and hematuria.   Musculoskeletal:  Negative for falls and joint pain.   Neurological:  Negative for tingling, speech change, focal weakness, seizures and headaches.   Psychiatric/Behavioral:  Negative for depression. The patient is not nervous/anxious.       Vital Signs: /89 (BP Location: Right arm, Patient Position: Sitting)   Pulse 70   Temp 97 °F (36.1 °C)   Resp 16   Ht 5' 6" (1.676 m)   Wt 70.3 kg (154 lb 15.7 oz)   SpO2 98%   BMI 25.01 kg/m²     ECOG Performance Status: 0 - Fully Active    Physical Exam  Vitals reviewed.   Constitutional:       Appearance: Normal appearance.   HENT:      Head: Normocephalic and atraumatic.   Eyes:      General: No scleral icterus.     Extraocular Movements: Extraocular movements intact.   Pulmonary:      Effort: Pulmonary effort is normal. No respiratory distress.   Abdominal:      General: There is no distension.   Musculoskeletal:      Cervical back: Neck supple.   Lymphadenopathy:      Cervical: No cervical adenopathy.   Skin:     General: Skin is warm and dry.   Neurological:     "  General: No focal deficit present.      Mental Status: He is alert and oriented to person, place, and time.      Cranial Nerves: No cranial nerve deficit.   Psychiatric:         Mood and Affect: Mood normal.         Behavior: Behavior normal.         Judgment: Judgment normal.        Labs:    Imaging: I have personally reviewed the patient's available images and reports and summarized pertinent findings above in HPI.     Pathology: No new path

## 2022-09-27 ENCOUNTER — SPECIALTY PHARMACY (OUTPATIENT)
Dept: PHARMACY | Facility: CLINIC | Age: 37
End: 2022-09-27
Payer: COMMERCIAL

## 2022-09-27 ENCOUNTER — OFFICE VISIT (OUTPATIENT)
Dept: NEUROSURGERY | Facility: CLINIC | Age: 37
End: 2022-09-27
Payer: COMMERCIAL

## 2022-09-27 ENCOUNTER — LAB VISIT (OUTPATIENT)
Dept: LAB | Facility: HOSPITAL | Age: 37
End: 2022-09-27
Attending: STUDENT IN AN ORGANIZED HEALTH CARE EDUCATION/TRAINING PROGRAM
Payer: COMMERCIAL

## 2022-09-27 VITALS
HEIGHT: 66 IN | WEIGHT: 155.63 LBS | SYSTOLIC BLOOD PRESSURE: 126 MMHG | BODY MASS INDEX: 25.01 KG/M2 | HEART RATE: 65 BPM | DIASTOLIC BLOOD PRESSURE: 88 MMHG

## 2022-09-27 DIAGNOSIS — R56.9 SEIZURE: ICD-10-CM

## 2022-09-27 DIAGNOSIS — Z51.11 ENCOUNTER FOR CHEMOTHERAPY MANAGEMENT: ICD-10-CM

## 2022-09-27 DIAGNOSIS — D72.810 LYMPHOPENIA: ICD-10-CM

## 2022-09-27 DIAGNOSIS — C71.1 MALIGNANT ASTROCYTOMA OF FRONTAL LOBE: Primary | ICD-10-CM

## 2022-09-27 DIAGNOSIS — C71.9 ASTROCYTOMA BRAIN TUMOR: ICD-10-CM

## 2022-09-27 PROBLEM — G40.209 LOCALIZATION-RELATED (FOCAL) (PARTIAL) SYMPTOMATIC EPILEPSY AND EPILEPTIC SYNDROMES WITH COMPLEX PARTIAL SEIZURES, NOT INTRACTABLE, WITHOUT STATUS EPILEPTICUS: Status: RESOLVED | Noted: 2018-08-11 | Resolved: 2022-09-27

## 2022-09-27 LAB
ALBUMIN SERPL BCP-MCNC: 4.2 G/DL (ref 3.5–5.2)
ALP SERPL-CCNC: 91 U/L (ref 55–135)
ALT SERPL W/O P-5'-P-CCNC: 30 U/L (ref 10–44)
ANION GAP SERPL CALC-SCNC: 10 MMOL/L (ref 8–16)
AST SERPL-CCNC: 17 U/L (ref 10–40)
BASOPHILS # BLD AUTO: 0.04 K/UL (ref 0–0.2)
BASOPHILS NFR BLD: 0.8 % (ref 0–1.9)
BILIRUB SERPL-MCNC: 1.1 MG/DL (ref 0.1–1)
BUN SERPL-MCNC: 5 MG/DL (ref 6–20)
CALCIUM SERPL-MCNC: 9.3 MG/DL (ref 8.7–10.5)
CHLORIDE SERPL-SCNC: 104 MMOL/L (ref 95–110)
CO2 SERPL-SCNC: 22 MMOL/L (ref 23–29)
CREAT SERPL-MCNC: 0.9 MG/DL (ref 0.5–1.4)
DIFFERENTIAL METHOD: ABNORMAL
EOSINOPHIL # BLD AUTO: 0.2 K/UL (ref 0–0.5)
EOSINOPHIL NFR BLD: 4.2 % (ref 0–8)
ERYTHROCYTE [DISTWIDTH] IN BLOOD BY AUTOMATED COUNT: 12.5 % (ref 11.5–14.5)
EST. GFR  (NO RACE VARIABLE): >60 ML/MIN/1.73 M^2
GLUCOSE SERPL-MCNC: 96 MG/DL (ref 70–110)
HCT VFR BLD AUTO: 39 % (ref 40–54)
HGB BLD-MCNC: 13.5 G/DL (ref 14–18)
IMM GRANULOCYTES # BLD AUTO: 0.02 K/UL (ref 0–0.04)
IMM GRANULOCYTES NFR BLD AUTO: 0.4 % (ref 0–0.5)
LYMPHOCYTES # BLD AUTO: 0.5 K/UL (ref 1–4.8)
LYMPHOCYTES NFR BLD: 9.6 % (ref 18–48)
MCH RBC QN AUTO: 31.5 PG (ref 27–31)
MCHC RBC AUTO-ENTMCNC: 34.6 G/DL (ref 32–36)
MCV RBC AUTO: 91 FL (ref 82–98)
MONOCYTES # BLD AUTO: 1 K/UL (ref 0.3–1)
MONOCYTES NFR BLD: 19.6 % (ref 4–15)
NEUTROPHILS # BLD AUTO: 3.3 K/UL (ref 1.8–7.7)
NEUTROPHILS NFR BLD: 65.4 % (ref 38–73)
NRBC BLD-RTO: 0 /100 WBC
PLATELET # BLD AUTO: 226 K/UL (ref 150–450)
PMV BLD AUTO: 10.8 FL (ref 9.2–12.9)
POTASSIUM SERPL-SCNC: 4.4 MMOL/L (ref 3.5–5.1)
PROT SERPL-MCNC: 7.1 G/DL (ref 6–8.4)
RBC # BLD AUTO: 4.29 M/UL (ref 4.6–6.2)
SODIUM SERPL-SCNC: 136 MMOL/L (ref 136–145)
WBC # BLD AUTO: 5 K/UL (ref 3.9–12.7)

## 2022-09-27 PROCEDURE — 3008F PR BODY MASS INDEX (BMI) DOCUMENTED: ICD-10-PCS | Mod: CPTII,S$GLB,, | Performed by: PSYCHIATRY & NEUROLOGY

## 2022-09-27 PROCEDURE — 36415 COLL VENOUS BLD VENIPUNCTURE: CPT | Performed by: STUDENT IN AN ORGANIZED HEALTH CARE EDUCATION/TRAINING PROGRAM

## 2022-09-27 PROCEDURE — 3074F SYST BP LT 130 MM HG: CPT | Mod: CPTII,S$GLB,, | Performed by: PSYCHIATRY & NEUROLOGY

## 2022-09-27 PROCEDURE — 3074F PR MOST RECENT SYSTOLIC BLOOD PRESSURE < 130 MM HG: ICD-10-PCS | Mod: CPTII,S$GLB,, | Performed by: PSYCHIATRY & NEUROLOGY

## 2022-09-27 PROCEDURE — 3079F DIAST BP 80-89 MM HG: CPT | Mod: CPTII,S$GLB,, | Performed by: PSYCHIATRY & NEUROLOGY

## 2022-09-27 PROCEDURE — 80053 COMPREHEN METABOLIC PANEL: CPT | Performed by: STUDENT IN AN ORGANIZED HEALTH CARE EDUCATION/TRAINING PROGRAM

## 2022-09-27 PROCEDURE — 85025 COMPLETE CBC W/AUTO DIFF WBC: CPT | Performed by: STUDENT IN AN ORGANIZED HEALTH CARE EDUCATION/TRAINING PROGRAM

## 2022-09-27 PROCEDURE — 99999 PR PBB SHADOW E&M-EST. PATIENT-LVL III: CPT | Mod: PBBFAC,,, | Performed by: PSYCHIATRY & NEUROLOGY

## 2022-09-27 PROCEDURE — 99215 PR OFFICE/OUTPT VISIT, EST, LEVL V, 40-54 MIN: ICD-10-PCS | Mod: S$GLB,,, | Performed by: PSYCHIATRY & NEUROLOGY

## 2022-09-27 PROCEDURE — 99215 OFFICE O/P EST HI 40 MIN: CPT | Mod: S$GLB,,, | Performed by: PSYCHIATRY & NEUROLOGY

## 2022-09-27 PROCEDURE — 3008F BODY MASS INDEX DOCD: CPT | Mod: CPTII,S$GLB,, | Performed by: PSYCHIATRY & NEUROLOGY

## 2022-09-27 PROCEDURE — 99999 PR PBB SHADOW E&M-EST. PATIENT-LVL III: ICD-10-PCS | Mod: PBBFAC,,, | Performed by: PSYCHIATRY & NEUROLOGY

## 2022-09-27 PROCEDURE — 1159F PR MEDICATION LIST DOCUMENTED IN MEDICAL RECORD: ICD-10-PCS | Mod: CPTII,S$GLB,, | Performed by: PSYCHIATRY & NEUROLOGY

## 2022-09-27 PROCEDURE — 1159F MED LIST DOCD IN RCRD: CPT | Mod: CPTII,S$GLB,, | Performed by: PSYCHIATRY & NEUROLOGY

## 2022-09-27 PROCEDURE — 3079F PR MOST RECENT DIASTOLIC BLOOD PRESSURE 80-89 MM HG: ICD-10-PCS | Mod: CPTII,S$GLB,, | Performed by: PSYCHIATRY & NEUROLOGY

## 2022-09-27 RX ORDER — TEMOZOLOMIDE 20 MG/1
20 CAPSULE ORAL DAILY
Qty: 5 CAPSULE | Refills: 11 | Status: SHIPPED | OUTPATIENT
Start: 2022-09-27

## 2022-09-27 RX ORDER — TEMOZOLOMIDE 250 MG/1
250 CAPSULE ORAL DAILY
Qty: 5 CAPSULE | Refills: 11 | Status: SHIPPED | OUTPATIENT
Start: 2022-09-27

## 2022-09-27 RX ORDER — TEMOZOLOMIDE 100 MG/1
100 CAPSULE ORAL DAILY
Qty: 5 CAPSULE | Refills: 11 | Status: SHIPPED | OUTPATIENT
Start: 2022-09-27

## 2022-09-27 NOTE — PROGRESS NOTES
Subjective:       Patient ID: Дмитрий Winn is a 37 y.o. male.    Chief Complaint: brain tumor    Oncologic History  8/2018: had a first time seizure at his parent's house and was found to have a right frontal mass. Subtotal resection performed at [facility] revealed astrocytoma, IDH-mutant, WHO grade 2.  1/29/2022: surveillance imaging revealed growth of lesion  2/17/2022: repeat subtotal resection by Chago Van; pathology consistent with recurrent astrocytoma, IDH-mutant, WHO grade 2  5/9-6/20/2022: chemoradiation to 54Gy over 30 fractions  7/2022: C1 TMZ @150mg/m2  8/2022: C2 TMZ @150mg/m2 (dose inadvertently not increased)    Currently, Дмитрий is doing well. He remains seizure free on levetiracetam. No side effects from chemotherapy. 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 60 tablet 11    ondansetron (ZOFRAN) 8 MG tablet Take 1 tablet (8 mg total) by mouth every 8 (eight) hours as needed for Nausea. 30 tablet 3    temozolomide (TEMODAR) 100 MG capsule Take 1 capsule (100 mg total) by mouth once daily Take as directed days 1-5 of every 28 days. Take on an empty stomach. with 2 other temozolomide prescriptions for 370 mg total. 5 capsule 11    temozolomide (TEMODAR) 20 MG capsule Take 1 capsule (20 mg total) by mouth once daily Take as directed days 1-5 of every 28 days. Take on an empty stomach. with 2 other temozolomide prescriptions for 370 mg total. 5 capsule 11    temozolomide (TEMODAR) 250 MG capsule Take 1 capsule (250 mg total) by mouth once daily Take as directed days 1-5 of every 28 days. Take on an empty stomach. with 2 other  temozolomide prescriptions for 370 mg total. 5 capsule 11     No current facility-administered medications for this visit.      Past Surgical History:   Procedure Laterality Date    CRANIOTOMY FOR EXCISION OF INTRACRANIAL TUMOR Right 8/10/2018    Procedure: CRANIOTOMY, FOR INTRACRANIAL NEOPLASM EXCISION Right eyebrow incision / LAYNE BrainPath craniotomy for tumor resection;  Surgeon: Lefty Paulson MD;  Location: [facility] OR;  Service: Neurosurgery;  Laterality: Right;    CRANIOTOMY USING FRAMELESS STEREOTAXY Right 2022    Procedure: CRANIOTOMY, USING FRAMELESS STEREOTAXY;  Surgeon: Chago Van MD;  [facility];  Service: Neurosurgery;  Laterality: Right;    cyst removed      SURGICAL REMOVAL OF PILONIDAL CYST        Family History   Problem Relation Age of Onset    Cancer Maternal Grandfather     Prostate cancer Maternal Grandfather     Cancer Paternal Grandmother     Breast cancer Paternal Grandmother     Hypertension Mother     No Known Problems Sister     No Known Problems Brother       Social History     Tobacco Use    Smoking status: Former     Packs/day: 1.00     Years: 8.00     Pack years: 8.00     Types: Cigarettes     Start date:      Quit date: 2018     Years since quittin.1    Smokeless tobacco: Never   Substance Use Topics    Alcohol use: Yes     Alcohol/week: 42.0 standard drinks     Types: 42 Cans of beer per week     Comment: 6 pack daily     Drug use: No      Review of Systems   Constitutional:  Negative for fatigue.   HENT:  Negative for voice change.    Respiratory:  Negative for shortness of breath.    Cardiovascular:  Negative for leg swelling.   Gastrointestinal:  Negative for nausea.   Musculoskeletal:  Negative for gait problem.   Neurological:  Negative for tremors, seizures, weakness, numbness, coordination difficulties and coordination difficulties.   Psychiatric/Behavioral:  Negative for behavioral problems.    KPS: 100      Objective:      Vitals:     09/27/22 0824   BP: 126/88   Pulse: 65        NEUROLOGICAL EXAMINATION:     MENTAL STATUS   Follows 3 step commands.   Attention: normal. Concentration: normal.   Speech: speech is normal   Level of consciousness: alert  Knowledge: good.     CRANIAL NERVES   Cranial nerves II through XII intact.     MOTOR EXAM   Muscle bulk: normal  Overall muscle tone: normal    Strength   Strength 5/5 throughout.     REFLEXES     Reflexes   Right biceps: 2+  Left biceps: 2+  Right triceps: 2+  Left triceps: 2+  Right patellar: 2+  Left patellar: 2+  Right achilles: 2+  Left achilles: 2+    SENSORY EXAM   Light touch normal.     GAIT AND COORDINATION     Gait  Gait: normal     Coordination   Finger to nose coordination: normal    Tremor   Resting tremor: absent  Intention tremor: absent  Action tremor: absent     Imaging:  MRI brain from 9/19/2022 was personally visualized and compared to prior from 6/21/2022. Pertinent for the following:  Impression:  Essentially stable appearance is patient with prior right frontal astrocytoma resection.  Residual T2/FLAIR hyperintensity along the margins of the resection cavity, suspicious for residual tumor.  No interval growth.  No new edema, enhancement, or hyper perfusion.  No new lesions elsewhere.    Assessment:       Problem List Items Addressed This Visit          Neuro    Seizure       Oncology    Malignant astrocytoma of frontal lobe - Primary    Relevant Medications    temozolomide (TEMODAR) 20 MG capsule    temozolomide (TEMODAR) 100 MG capsule    temozolomide (TEMODAR) 250 MG capsule     Other Visit Diagnoses       Encounter for chemotherapy management        Lymphopenia                  Plan:       Дмитрий Winn is a 37 y.o. male with a grade 2 astrocytoma, IDH-mutant s/p subtotal resection 8/2018, re-resection 2/2022, chemoradiation completed 6/2022, and 2 cycles of adjuvant TMZ presenting for continued treatment. Tumor diagnosis was heralded by seizures. Currently  Дмитрий is asymptomatic.    Astrocytoma, IDH-mutant, WHO grade 2  Дмитрий's labs are currently adequate for continued therapy. MRI brain from last week reviewed without evidence of tumor progression. Discussed planned increase in temozolomide dose to 200mg/m2 = 370mg nightly for 5 nights. He will pre-treat with ondansetron 8mg 30-45 min prior to chemotherapy dose. He will return to clinic in 4 weeks with CBC/CMP. Next MRI brain due 12/2022.      Lymphopenia  Lymphocytes 0.5 today. If 0.5 or less at next lab draw, will re-start Bactrim for PJP ppx.    Seizures  Has had 2 seizures- one in 2018 heralding tumor diagnosis and one when he was taken off levetiracetam. No seizures on current AED dose. Continue levetiracetam 750mg bid.      52 minutes of total time spent on the encounter, which includes face to face time and non-face to face time preparing to see the patient (eg, review of tests), Obtaining and/or reviewing separately obtained history, Documenting clinical information in the electronic or other health record, Independently interpreting results (not separately reported) and communicating results to the patient/family/caregiver, or Care coordination (not separately reported).     Carleen Stoddard MD  Neuro-Oncology

## 2022-10-05 ENCOUNTER — SPECIALTY PHARMACY (OUTPATIENT)
Dept: PHARMACY | Facility: CLINIC | Age: 37
End: 2022-10-05
Payer: COMMERCIAL

## 2022-10-05 NOTE — TELEPHONE ENCOUNTER
Specialty Pharmacy - Refill Coordination    Specialty Medication Orders Linked to Encounter      Flowsheet Row Most Recent Value   Medication #1 temozolomide (TEMODAR) 20 MG capsule (Order#[id number], Rx#[id number])   Medication #2 temozolomide (TEMODAR) 100 MG capsule (Order#[id number], Rx#[id number])   Medication #3 temozolomide (TEMODAR) 250 MG capsule (Order#[id number], Rx#[id number])         Counseled pt on dose change pt is aware.    Refill Questions - Documented Responses      Flowsheet Row Most Recent Value   Refill Screening Questions    Changes to allergies? No   Changes to medications? No   New conditions since last clinic visit? No   Unplanned office visit, urgent care, ED, or hospital admission in the last 4 weeks? No   How does patient/caregiver feel medication is working? Good   Financial problems or insurance changes? No   How many doses of your specialty medications were missed in the last 4 weeks? 0   Would patient like to speak to a pharmacist? No   When does the patient need to receive the medication? 10/11/22   Refill Delivery Questions    How will the patient receive the medication? MEDRx   When does the patient need to receive the medication? 10/11/22   Shipping Address Home   Address in [facility] confirmed and updated if neccessary? Yes   Expected Copay ($) 0   Is the patient able to afford the medication copay? Yes   Payment Method zero copay   Days supply of Refill 28   Supplies needed? No supplies needed   Refill activity completed? Yes   Refill activity plan Refill scheduled   Shipment/Pickup Date: 10/06/22            Current Outpatient Medications   Medication Sig    levETIRAcetam (KEPPRA) 750 MG Tab Take 1 tablet (750 mg total) by mouth 2 (two) times daily.    ondansetron (ZOFRAN) 8 MG tablet Take 1 tablet (8 mg total) by mouth every 8 (eight) hours as needed for Nausea.    temozolomide (TEMODAR) 100 MG capsule Take 1 capsule (100 mg total) by mouth once daily Take as directed  days 1-5 of every 28 days. Take on an empty stomach. with 2 other temozolomide prescriptions for 370 mg total.    temozolomide (TEMODAR) 20 MG capsule Take 1 capsule (20 mg total) by mouth once daily Take as directed days 1-5 of every 28 days. Take on an empty stomach. with 2 other temozolomide prescriptions for 370 mg total.    temozolomide (TEMODAR) 250 MG capsule Take 1 capsule (250 mg total) by mouth once daily Take as directed days 1-5 of every 28 days. Take on an empty stomach. with 2 other temozolomide prescriptions for 370 mg total.   Last reviewed on 9/27/2022  8:26 AM by Jennifer Allen MA    Review of patient's allergies indicates:  No Known Allergies Last reviewed on  9/27/2022 8:25 AM by Jennifer Allen      Tasks added this encounter   11/2/2022 - Refill Call (Auto Added)   Tasks due within next 3 months   10/11/2022 - Clinical - Follow Up Assesement (180 day)     Lizabeth Garcia, PharmD  [facility]  [address] [place], [place] [address]  [facility] [address]  Phone: [phone]  Fax: [phone]

## 2022-10-11 ENCOUNTER — SPECIALTY PHARMACY (OUTPATIENT)
Dept: PHARMACY | Facility: CLINIC | Age: 37
End: 2022-10-11
Payer: COMMERCIAL

## 2022-10-11 NOTE — TELEPHONE ENCOUNTER
Specialty Pharmacy - Clinical Reassessment    Specialty Medication Orders Linked to Encounter      Flowsheet Row Most Recent Value   Medication #1 temozolomide (TEMODAR) 20 MG capsule (Order#[id number], Rx#[id number])   Medication #2 temozolomide (TEMODAR) 100 MG capsule (Order#[id number], Rx#[id number])   Medication #3 temozolomide (TEMODAR) 250 MG capsule (Order#[id number], Rx#[id number])          Patient Diagnosis   C71.9 - Astrocytoma brain tumor    Дмитрий Winn is a 37 y.o. male, who is followed by the specialty pharmacy service for management and education of his Temodar.  He has been on therapy with Temodar for 6 months.  I have reviewed his electronic medical record and current medication list and determined that specialty medication adjustment Is not needed at this time.    Patient has not experienced adverse events.  He Is adherent reporting 0 missed doses since last review.   He is meeting goals of therapy and will continue treatment.        10/11/2022    11:59 AM 10/5/2022     9:45 AM 9/7/2022     2:20 PM 8/9/2022     9:03 AM 7/12/2022     3:54 PM 5/30/2022    10:19 AM 4/21/2022     9:59 AM   Follow Up Review   # of missed doses  0 0 0 0 0    New Medications?  No No No No No    New Conditions?  No No No No No    New Allergies?  No No No No No    Med Effective?  Good Good Good Good Good    Missed activities? No      No   Urgent Care?  No No No No No    Requested Pharmacist?  No No No No No          Therapy is appropriate to continue.    Therapy is effective: Yes  On scale of 1 to 10, how does patient rank quality of life? (10 - Best): Unable to Assess  Recommendations: none at this time.  Review Method: Chart Review    Tasks added this encounter   4/2/2023 - Clinical - Follow Up Assesement (180 day)   Tasks due within next 3 months   11/2/2022 - Refill Call (Auto Added)     Brandi Caicedo, PharmD  [facility]  [address] [facility] [address]  Phone:  [phone]  Fax: [phone]

## 2022-10-14 ENCOUNTER — PATIENT MESSAGE (OUTPATIENT)
Dept: NEUROSURGERY | Facility: CLINIC | Age: 37
End: 2022-10-14
Payer: COMMERCIAL

## 2022-10-25 ENCOUNTER — LAB VISIT (OUTPATIENT)
Dept: LAB | Facility: HOSPITAL | Age: 37
End: 2022-10-25
Attending: PSYCHIATRY & NEUROLOGY
Payer: COMMERCIAL

## 2022-10-25 ENCOUNTER — OFFICE VISIT (OUTPATIENT)
Dept: NEUROSURGERY | Facility: CLINIC | Age: 37
End: 2022-10-25
Payer: COMMERCIAL

## 2022-10-25 VITALS
HEART RATE: 72 BPM | WEIGHT: 156.06 LBS | DIASTOLIC BLOOD PRESSURE: 77 MMHG | SYSTOLIC BLOOD PRESSURE: 138 MMHG | BODY MASS INDEX: 25.08 KG/M2 | HEIGHT: 66 IN

## 2022-10-25 DIAGNOSIS — D72.810 LYMPHOPENIA: ICD-10-CM

## 2022-10-25 DIAGNOSIS — R56.9 SEIZURE: ICD-10-CM

## 2022-10-25 DIAGNOSIS — C71.1 MALIGNANT ASTROCYTOMA OF FRONTAL LOBE: Primary | ICD-10-CM

## 2022-10-25 DIAGNOSIS — C71.9 ASTROCYTOMA BRAIN TUMOR: ICD-10-CM

## 2022-10-25 DIAGNOSIS — Z51.11 ENCOUNTER FOR CHEMOTHERAPY MANAGEMENT: ICD-10-CM

## 2022-10-25 LAB
ALBUMIN SERPL BCP-MCNC: 4.3 G/DL (ref 3.5–5.2)
ALP SERPL-CCNC: 85 U/L (ref 55–135)
ALT SERPL W/O P-5'-P-CCNC: 41 U/L (ref 10–44)
ANION GAP SERPL CALC-SCNC: 13 MMOL/L (ref 8–16)
AST SERPL-CCNC: 26 U/L (ref 10–40)
BASOPHILS # BLD AUTO: 0.05 K/UL (ref 0–0.2)
BASOPHILS NFR BLD: 1 % (ref 0–1.9)
BILIRUB SERPL-MCNC: 0.6 MG/DL (ref 0.1–1)
BUN SERPL-MCNC: 6 MG/DL (ref 6–20)
CALCIUM SERPL-MCNC: 9.5 MG/DL (ref 8.7–10.5)
CHLORIDE SERPL-SCNC: 104 MMOL/L (ref 95–110)
CO2 SERPL-SCNC: 22 MMOL/L (ref 23–29)
CREAT SERPL-MCNC: 1 MG/DL (ref 0.5–1.4)
DIFFERENTIAL METHOD: ABNORMAL
EOSINOPHIL # BLD AUTO: 0.3 K/UL (ref 0–0.5)
EOSINOPHIL NFR BLD: 5.5 % (ref 0–8)
ERYTHROCYTE [DISTWIDTH] IN BLOOD BY AUTOMATED COUNT: 11.9 % (ref 11.5–14.5)
EST. GFR  (NO RACE VARIABLE): >60 ML/MIN/1.73 M^2
GLUCOSE SERPL-MCNC: 107 MG/DL (ref 70–110)
HCT VFR BLD AUTO: 42.7 % (ref 40–54)
HGB BLD-MCNC: 14.7 G/DL (ref 14–18)
IMM GRANULOCYTES # BLD AUTO: 0.02 K/UL (ref 0–0.04)
IMM GRANULOCYTES NFR BLD AUTO: 0.4 % (ref 0–0.5)
LYMPHOCYTES # BLD AUTO: 0.6 K/UL (ref 1–4.8)
LYMPHOCYTES NFR BLD: 11.7 % (ref 18–48)
MCH RBC QN AUTO: 31.2 PG (ref 27–31)
MCHC RBC AUTO-ENTMCNC: 34.4 G/DL (ref 32–36)
MCV RBC AUTO: 91 FL (ref 82–98)
MONOCYTES # BLD AUTO: 0.9 K/UL (ref 0.3–1)
MONOCYTES NFR BLD: 16.8 % (ref 4–15)
NEUTROPHILS # BLD AUTO: 3.3 K/UL (ref 1.8–7.7)
NEUTROPHILS NFR BLD: 64.6 % (ref 38–73)
NRBC BLD-RTO: 0 /100 WBC
PLATELET # BLD AUTO: 256 K/UL (ref 150–450)
PMV BLD AUTO: 10.8 FL (ref 9.2–12.9)
POTASSIUM SERPL-SCNC: 4.4 MMOL/L (ref 3.5–5.1)
PROT SERPL-MCNC: 7.3 G/DL (ref 6–8.4)
RBC # BLD AUTO: 4.71 M/UL (ref 4.6–6.2)
SODIUM SERPL-SCNC: 139 MMOL/L (ref 136–145)
WBC # BLD AUTO: 5.06 K/UL (ref 3.9–12.7)

## 2022-10-25 PROCEDURE — 99215 OFFICE O/P EST HI 40 MIN: CPT | Mod: S$GLB,,, | Performed by: PSYCHIATRY & NEUROLOGY

## 2022-10-25 PROCEDURE — 99999 PR PBB SHADOW E&M-EST. PATIENT-LVL II: ICD-10-PCS | Mod: PBBFAC,,, | Performed by: PSYCHIATRY & NEUROLOGY

## 2022-10-25 PROCEDURE — 3078F DIAST BP <80 MM HG: CPT | Mod: CPTII,S$GLB,, | Performed by: PSYCHIATRY & NEUROLOGY

## 2022-10-25 PROCEDURE — 99215 PR OFFICE/OUTPT VISIT, EST, LEVL V, 40-54 MIN: ICD-10-PCS | Mod: S$GLB,,, | Performed by: PSYCHIATRY & NEUROLOGY

## 2022-10-25 PROCEDURE — 3075F SYST BP GE 130 - 139MM HG: CPT | Mod: CPTII,S$GLB,, | Performed by: PSYCHIATRY & NEUROLOGY

## 2022-10-25 PROCEDURE — 3075F PR MOST RECENT SYSTOLIC BLOOD PRESS GE 130-139MM HG: ICD-10-PCS | Mod: CPTII,S$GLB,, | Performed by: PSYCHIATRY & NEUROLOGY

## 2022-10-25 PROCEDURE — 99999 PR PBB SHADOW E&M-EST. PATIENT-LVL II: CPT | Mod: PBBFAC,,, | Performed by: PSYCHIATRY & NEUROLOGY

## 2022-10-25 PROCEDURE — 36415 COLL VENOUS BLD VENIPUNCTURE: CPT | Performed by: STUDENT IN AN ORGANIZED HEALTH CARE EDUCATION/TRAINING PROGRAM

## 2022-10-25 PROCEDURE — 80053 COMPREHEN METABOLIC PANEL: CPT | Performed by: STUDENT IN AN ORGANIZED HEALTH CARE EDUCATION/TRAINING PROGRAM

## 2022-10-25 PROCEDURE — 3078F PR MOST RECENT DIASTOLIC BLOOD PRESSURE < 80 MM HG: ICD-10-PCS | Mod: CPTII,S$GLB,, | Performed by: PSYCHIATRY & NEUROLOGY

## 2022-10-25 PROCEDURE — 85025 COMPLETE CBC W/AUTO DIFF WBC: CPT | Performed by: STUDENT IN AN ORGANIZED HEALTH CARE EDUCATION/TRAINING PROGRAM

## 2022-11-03 ENCOUNTER — SPECIALTY PHARMACY (OUTPATIENT)
Dept: PHARMACY | Facility: CLINIC | Age: 37
End: 2022-11-03
Payer: COMMERCIAL

## 2022-11-03 NOTE — TELEPHONE ENCOUNTER
Outgoing call regarding Temodar refill. Pt stated that he took his last one on 10/27/22. Pt is due to restart on 11/20/22. Pt stated its okay to call him to follow up with his refill when it gets a litter closer to his restart date.

## 2022-11-11 NOTE — TELEPHONE ENCOUNTER
Spoke to  Taiwobayleeefra and he confirmed that he is due to start his next cycle of Temodar after he is seen in clinic with Dr. Stoddard. Next scheduled appt on 11/25.

## 2022-11-25 NOTE — TELEPHONE ENCOUNTER
Specialty Pharmacy - Refill Coordination    Specialty Medication Orders Linked to Encounter      Flowsheet Row Most Recent Value   Medication #1 temozolomide (TEMODAR) 100 MG capsule (Order#[id number], Rx#[id number])   Medication #2 temozolomide (TEMODAR) 20 MG capsule (Order#[id number], Rx#[id number])   Medication #3 temozolomide (TEMODAR) 250 MG capsule (Order#[id number], Rx#[id number])        Incoming call from [facility] stating that pt could continue with cycle. If anything changes after apt will let us know. Set up for delivery.     Refill Questions - Documented Responses      Flowsheet Row Most Recent Value   Patient Availability and HIPAA Verification    Does patient want to proceed with activity? Yes   HIPAA/medical authority confirmed? Yes   Relationship to patient of person spoken to? Self   Refill Screening Questions    Changes to allergies? No   Changes to medications? No   New conditions since last clinic visit? No   Unplanned office visit, urgent care, ED, or hospital admission in the last 4 weeks? No   How does patient/caregiver feel medication is working? Good   Financial problems or insurance changes? No   How many doses of your specialty medications were missed in the last 4 weeks? 0   Would patient like to speak to a pharmacist? No   When does the patient need to receive the medication? 11/29/22   Refill Delivery Questions    How will the patient receive the medication? MEDRx   When does the patient need to receive the medication? 11/29/22   Shipping Address Home   Address in [facility] confirmed and updated if neccessary? Yes   Expected Copay ($) 0   Is the patient able to afford the medication copay? Yes   Payment Method zero copay   Days supply of Refill 28   Supplies needed? No supplies needed   Refill activity completed? Yes   Refill activity plan Refill scheduled   Shipment/Pickup Date: 11/28/22            Current Outpatient Medications   Medication Sig    levETIRAcetam (KEPPRA) 750 MG Tab  Take 1 tablet (750 mg total) by mouth 2 (two) times daily.    ondansetron (ZOFRAN) 8 MG tablet Take 1 tablet (8 mg total) by mouth every 8 (eight) hours as needed for Nausea.    temozolomide (TEMODAR) 100 MG capsule Take 1 capsule (100 mg total) by mouth once daily as directed days 1-5 of every 28 day cycle. Take on an empty stomach. with 2 other temozolomide prescriptions for 370 mg total.    temozolomide (TEMODAR) 20 MG capsule Take 1 capsule (20 mg total) by mouth once daily as directed days 1-5 of every 28 day cycle. Take on an empty stomach. with 2 other temozolomide prescriptions for 370 mg total.    temozolomide (TEMODAR) 250 MG capsule Take 1 capsule (250 mg total) by mouth once daily as directed days 1-5 of every 28 day cycle. Take on an empty stomach with 2 other temozolomide prescriptions for 370 mg total.   Last reviewed on 9/27/2022  8:26 AM by Jennifer Allen MA    Review of patient's allergies indicates:  No Known Allergies Last reviewed on  10/25/2022 9:43 AM by Jennifer Allen    Interventions added this encounter   Open: [facility] Care Plan: temozolomide (TEMODAR) 100 MG capsule     Tasks added this encounter   12/20/2022 - Refill Call (Auto Added)   Tasks due within next 3 months   No tasks due.     Brandi Caicedo, PharmD  [facility]  [address] [place] [address]  Phone: [phone]  Fax: [phone]

## 2022-11-29 ENCOUNTER — LAB VISIT (OUTPATIENT)
Dept: LAB | Facility: HOSPITAL | Age: 37
End: 2022-11-29
Payer: COMMERCIAL

## 2022-11-29 ENCOUNTER — OFFICE VISIT (OUTPATIENT)
Dept: NEUROSURGERY | Facility: CLINIC | Age: 37
End: 2022-11-29
Payer: COMMERCIAL

## 2022-11-29 VITALS
WEIGHT: 158.5 LBS | DIASTOLIC BLOOD PRESSURE: 89 MMHG | HEIGHT: 66 IN | BODY MASS INDEX: 25.47 KG/M2 | HEART RATE: 79 BPM | SYSTOLIC BLOOD PRESSURE: 133 MMHG | TEMPERATURE: 99 F

## 2022-11-29 DIAGNOSIS — C71.9 ASTROCYTOMA BRAIN TUMOR: ICD-10-CM

## 2022-11-29 DIAGNOSIS — R74.8 ELEVATED LIVER ENZYMES: ICD-10-CM

## 2022-11-29 DIAGNOSIS — R56.9 SEIZURE: ICD-10-CM

## 2022-11-29 DIAGNOSIS — C71.9 ASTROCYTOMA: Primary | ICD-10-CM

## 2022-11-29 LAB
ALBUMIN SERPL BCP-MCNC: 4.3 G/DL (ref 3.5–5.2)
ALP SERPL-CCNC: 72 U/L (ref 55–135)
ALT SERPL W/O P-5'-P-CCNC: 62 U/L (ref 10–44)
ANION GAP SERPL CALC-SCNC: 8 MMOL/L (ref 8–16)
AST SERPL-CCNC: 43 U/L (ref 10–40)
BASOPHILS # BLD AUTO: 0.04 K/UL (ref 0–0.2)
BASOPHILS NFR BLD: 1 % (ref 0–1.9)
BILIRUB SERPL-MCNC: 0.4 MG/DL (ref 0.1–1)
BUN SERPL-MCNC: 8 MG/DL (ref 6–20)
CALCIUM SERPL-MCNC: 9.2 MG/DL (ref 8.7–10.5)
CHLORIDE SERPL-SCNC: 103 MMOL/L (ref 95–110)
CO2 SERPL-SCNC: 28 MMOL/L (ref 23–29)
CREAT SERPL-MCNC: 0.9 MG/DL (ref 0.5–1.4)
DIFFERENTIAL METHOD: ABNORMAL
EOSINOPHIL # BLD AUTO: 0.1 K/UL (ref 0–0.5)
EOSINOPHIL NFR BLD: 1.3 % (ref 0–8)
ERYTHROCYTE [DISTWIDTH] IN BLOOD BY AUTOMATED COUNT: 12.8 % (ref 11.5–14.5)
EST. GFR  (NO RACE VARIABLE): >60 ML/MIN/1.73 M^2
GLUCOSE SERPL-MCNC: 92 MG/DL (ref 70–110)
HCT VFR BLD AUTO: 41.4 % (ref 40–54)
HGB BLD-MCNC: 14.4 G/DL (ref 14–18)
IMM GRANULOCYTES # BLD AUTO: 0.02 K/UL (ref 0–0.04)
IMM GRANULOCYTES NFR BLD AUTO: 0.5 % (ref 0–0.5)
LYMPHOCYTES # BLD AUTO: 0.7 K/UL (ref 1–4.8)
LYMPHOCYTES NFR BLD: 16.8 % (ref 18–48)
MCH RBC QN AUTO: 31.7 PG (ref 27–31)
MCHC RBC AUTO-ENTMCNC: 34.8 G/DL (ref 32–36)
MCV RBC AUTO: 91 FL (ref 82–98)
MONOCYTES # BLD AUTO: 0.6 K/UL (ref 0.3–1)
MONOCYTES NFR BLD: 13.8 % (ref 4–15)
NEUTROPHILS # BLD AUTO: 2.7 K/UL (ref 1.8–7.7)
NEUTROPHILS NFR BLD: 66.6 % (ref 38–73)
NRBC BLD-RTO: 0 /100 WBC
PLATELET # BLD AUTO: 266 K/UL (ref 150–450)
PMV BLD AUTO: 10 FL (ref 9.2–12.9)
POTASSIUM SERPL-SCNC: 4.5 MMOL/L (ref 3.5–5.1)
PROT SERPL-MCNC: 7.2 G/DL (ref 6–8.4)
RBC # BLD AUTO: 4.54 M/UL (ref 4.6–6.2)
SODIUM SERPL-SCNC: 139 MMOL/L (ref 136–145)
WBC # BLD AUTO: 3.99 K/UL (ref 3.9–12.7)

## 2022-11-29 PROCEDURE — 99999 PR PBB SHADOW E&M-EST. PATIENT-LVL III: ICD-10-PCS | Mod: PBBFAC,,, | Performed by: PSYCHIATRY & NEUROLOGY

## 2022-11-29 PROCEDURE — 1159F MED LIST DOCD IN RCRD: CPT | Mod: CPTII,S$GLB,, | Performed by: PSYCHIATRY & NEUROLOGY

## 2022-11-29 PROCEDURE — 80053 COMPREHEN METABOLIC PANEL: CPT | Performed by: STUDENT IN AN ORGANIZED HEALTH CARE EDUCATION/TRAINING PROGRAM

## 2022-11-29 PROCEDURE — 99214 OFFICE O/P EST MOD 30 MIN: CPT | Mod: S$GLB,,, | Performed by: PSYCHIATRY & NEUROLOGY

## 2022-11-29 PROCEDURE — 1159F PR MEDICATION LIST DOCUMENTED IN MEDICAL RECORD: ICD-10-PCS | Mod: CPTII,S$GLB,, | Performed by: PSYCHIATRY & NEUROLOGY

## 2022-11-29 PROCEDURE — 85025 COMPLETE CBC W/AUTO DIFF WBC: CPT | Performed by: STUDENT IN AN ORGANIZED HEALTH CARE EDUCATION/TRAINING PROGRAM

## 2022-11-29 PROCEDURE — 3075F SYST BP GE 130 - 139MM HG: CPT | Mod: CPTII,S$GLB,, | Performed by: PSYCHIATRY & NEUROLOGY

## 2022-11-29 PROCEDURE — 3079F DIAST BP 80-89 MM HG: CPT | Mod: CPTII,S$GLB,, | Performed by: PSYCHIATRY & NEUROLOGY

## 2022-11-29 PROCEDURE — 99999 PR PBB SHADOW E&M-EST. PATIENT-LVL III: CPT | Mod: PBBFAC,,, | Performed by: PSYCHIATRY & NEUROLOGY

## 2022-11-29 PROCEDURE — 99214 PR OFFICE/OUTPT VISIT, EST, LEVL IV, 30-39 MIN: ICD-10-PCS | Mod: S$GLB,,, | Performed by: PSYCHIATRY & NEUROLOGY

## 2022-11-29 PROCEDURE — 3008F PR BODY MASS INDEX (BMI) DOCUMENTED: ICD-10-PCS | Mod: CPTII,S$GLB,, | Performed by: PSYCHIATRY & NEUROLOGY

## 2022-11-29 PROCEDURE — 3075F PR MOST RECENT SYSTOLIC BLOOD PRESS GE 130-139MM HG: ICD-10-PCS | Mod: CPTII,S$GLB,, | Performed by: PSYCHIATRY & NEUROLOGY

## 2022-11-29 PROCEDURE — 36415 COLL VENOUS BLD VENIPUNCTURE: CPT | Performed by: STUDENT IN AN ORGANIZED HEALTH CARE EDUCATION/TRAINING PROGRAM

## 2022-11-29 PROCEDURE — 3079F PR MOST RECENT DIASTOLIC BLOOD PRESSURE 80-89 MM HG: ICD-10-PCS | Mod: CPTII,S$GLB,, | Performed by: PSYCHIATRY & NEUROLOGY

## 2022-11-29 PROCEDURE — 3008F BODY MASS INDEX DOCD: CPT | Mod: CPTII,S$GLB,, | Performed by: PSYCHIATRY & NEUROLOGY

## 2022-11-29 NOTE — PROGRESS NOTES
Subjective:       Patient ID: Дмитрий Winn is a 37 y.o. male.    Chief Complaint: brain tumor    Oncologic History  8/2018: had a first time seizure at his parent's house and was found to have a right frontal mass. 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  1/29/2022: surveillance imaging revealed growth of lesion  2/17/2022: repeat subtotal resection by Chago Van; pathology consistent with recurrent astrocytoma, IDH-mutant, WHO grade 2  5/9-6/20/2022: chemoradiation to 54Gy over 30 fractions  7/2022: C1 TMZ @150mg/m2  8/2022: C2 TMZ @150mg/m2 (dose inadvertently not increased)  10/23/2022: C3 TMZ @200mg/m2 (delayed due to covid exposure)    Currently, Дмитрий is doing well. He remains seizure free on levetiracetam. No side effects from chemotherapy. 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 Work has been slow lately with the holidays. No new symptoms.    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 60 tablet 11    ondansetron (ZOFRAN) 8 MG tablet Take 1 tablet (8 mg total) by mouth every 8 (eight) hours as needed for Nausea. 30 tablet 3    temozolomide (TEMODAR) 100 MG capsule Take 1 capsule (100 mg total) by mouth once daily as directed days 1-5 of every 28 day cycle. Take on an empty stomach. with 2 other temozolomide prescriptions for 370 mg total. 5 capsule 11    temozolomide (TEMODAR) 20 MG capsule Take 1 capsule (20 mg total) by mouth once daily as directed days 1-5 of every 28 day cycle. Take on an empty stomach. with 2 other temozolomide prescriptions for 370 mg total. 5 capsule 11    temozolomide (TEMODAR) 250 MG capsule Take 1 capsule (250 mg total) by mouth once  daily as directed days 1-5 of every 28 day cycle. Take on an empty stomach with 2 other temozolomide prescriptions for 370 mg total. 5 capsule 11     No current facility-administered medications for this visit.      Past Surgical History:   Procedure Laterality Date    CRANIOTOMY FOR EXCISION OF INTRACRANIAL TUMOR Right 8/10/2018    Procedure: CRANIOTOMY, FOR INTRACRANIAL NEOPLASM EXCISION Right eyebrow incision / LAYNE Brain[employer] craniotomy for tumor resection;  Surgeon: Lefty Paulson MD;  Location: [facility] OR;  Service: Neurosurgery;  Laterality: Right;    CRANIOTOMY USING FRAMELESS STEREOTAXY Right 2022    Procedure: CRANIOTOMY, USING FRAMELESS STEREOTAXY;  Surgeon: Chago Van MD;  Location: [facility] OR [address] [place] [address];  Service: Neurosurgery;  Laterality: Right;    cyst removed      SURGICAL REMOVAL OF PILONIDAL CYST        Family History   Problem Relation Age of Onset    Cancer Maternal Grandfather     Prostate cancer Maternal Grandfather     Cancer Paternal Grandmother     Breast cancer Paternal Grandmother     Hypertension Mother     No Known Problems Sister     No Known Problems Brother       Social History     Tobacco Use    Smoking status: Former     Packs/day: 1.00     Years: 8.00     Pack years: 8.00     Types: Cigarettes     Start date:      Quit date: 2018     Years since quittin.2    Smokeless tobacco: Never   Substance Use Topics    Alcohol use: Yes     Alcohol/week: 42.0 standard drinks     Types: 42 Cans of beer per week     Comment: 6 pack daily     Drug use: No      Review of Systems   Constitutional:  Negative for fatigue.   HENT:  Negative for voice change.    Respiratory:  Negative for shortness of breath.    Cardiovascular:  Negative for leg swelling.   Gastrointestinal:  Negative for nausea.   Musculoskeletal:  Negative for gait problem.   Neurological:  Negative for tremors, seizures, weakness, numbness, coordination difficulties and coordination difficulties.    Psychiatric/Behavioral:  Negative for behavioral problems.    KPS: 100      Objective:      Vitals:    11/29/22 1301   BP: 133/89   Pulse: 79   Temp: 98.6 °F (37 °C)            NEUROLOGICAL EXAMINATION:     MENTAL STATUS   Follows 3 step commands.   Attention: normal. Concentration: normal.   Speech: speech is normal   Level of consciousness: alert  Knowledge: good.     CRANIAL NERVES   Cranial nerves II through XII intact.     MOTOR EXAM   Muscle bulk: normal  Overall muscle tone: normal    Strength   Strength 5/5 throughout.     REFLEXES     Reflexes   Right biceps: 2+  Left biceps: 2+  Right triceps: 2+  Left triceps: 2+  Right patellar: 2+  Left patellar: 2+  Right achilles: 2+  Left achilles: 2+       (Prior)     SENSORY EXAM   Light touch normal.        (Prior)     GAIT AND COORDINATION     Gait  Gait: normal    Tremor   Resting tremor: absent  Intention tremor: absent  Action tremor: absent     Imaging:  MRI brain from 9/19/2022 was personally visualized and compared to prior from 6/21/2022. Pertinent for the following:  Impression:  Essentially stable appearance is patient with prior right frontal astrocytoma resection.  Residual T2/FLAIR hyperintensity along the margins of the resection cavity, suspicious for residual tumor.  No interval growth.  No new edema, enhancement, or hyper perfusion.  No new lesions elsewhere.    Assessment:       Problem List Items Addressed This Visit          Neuro    Seizure     Other Visit Diagnoses       Astrocytoma    -  Primary    Elevated liver enzymes                  Plan:       Дмитрий Winn is a 37 y.o. male with a grade 2 astrocytoma, IDH-mutant s/p subtotal resection 8/2018, re-resection 2/2022, chemoradiation completed 6/2022, and ongoing adjuvant TMZ presenting for continued treatment. Tumor diagnosis was heralded by seizures. Currently Дмитрий is asymptomatic.    Astrocytoma, IDH-mutant, WHO grade 2  Дмитрий's labs are currently adequate for  continued therapy. Continue cycle 4 temozolomide dose at 200mg/m2 = 370mg nightly for 5 nights - plans to pick this up in the next couple of days. He will pre-treat with ondansetron 8mg 30-45 min prior to chemotherapy dose. He will return to clinic in 4 weeks with CBC/CMP. Next MRI brain scheduled 12/2022.      Elevated liver enzymes  AST/ALT mildly elevated today. May be due to TMZ and/or EtOH use. He increased his intake over the Thanksgiving holiday. He will cut back this month on EtOH and we will continue to monitor.    Seizures  Has had 2 seizures- one in 2018 heralding tumor diagnosis and one when he was taken off levetiracetam. No seizures on current AED dose. Continue levetiracetam 750mg bid.      30 minutes of total time spent on the encounter, which includes face to face time and non-face to face time preparing to see the patient (eg, review of tests), Obtaining and/or reviewing separately obtained history, Documenting clinical information in the electronic or other health record, Independently interpreting results (not separately reported) and communicating results to the patient/family/caregiver, or Care coordination (not separately reported).     Carleen Stoddard MD  Neuro-Oncology

## 2022-12-20 ENCOUNTER — OFFICE VISIT (OUTPATIENT)
Dept: NEUROSURGERY | Facility: CLINIC | Age: 37
End: 2022-12-20
Payer: COMMERCIAL

## 2022-12-20 ENCOUNTER — SPECIALTY PHARMACY (OUTPATIENT)
Dept: PHARMACY | Facility: CLINIC | Age: 37
End: 2022-12-20
Payer: COMMERCIAL

## 2022-12-20 VITALS
HEIGHT: 66 IN | WEIGHT: 160.5 LBS | TEMPERATURE: 98 F | DIASTOLIC BLOOD PRESSURE: 87 MMHG | BODY MASS INDEX: 25.79 KG/M2 | SYSTOLIC BLOOD PRESSURE: 130 MMHG | HEART RATE: 59 BPM

## 2022-12-20 DIAGNOSIS — C71.1 MALIGNANT ASTROCYTOMA OF FRONTAL LOBE: Primary | ICD-10-CM

## 2022-12-20 PROCEDURE — 99999 PR PBB SHADOW E&M-EST. PATIENT-LVL III: ICD-10-PCS | Mod: PBBFAC,,, | Performed by: NEUROLOGICAL SURGERY

## 2022-12-20 PROCEDURE — 3008F PR BODY MASS INDEX (BMI) DOCUMENTED: ICD-10-PCS | Mod: CPTII,S$GLB,, | Performed by: NEUROLOGICAL SURGERY

## 2022-12-20 PROCEDURE — 3079F DIAST BP 80-89 MM HG: CPT | Mod: CPTII,S$GLB,, | Performed by: NEUROLOGICAL SURGERY

## 2022-12-20 PROCEDURE — 3008F BODY MASS INDEX DOCD: CPT | Mod: CPTII,S$GLB,, | Performed by: NEUROLOGICAL SURGERY

## 2022-12-20 PROCEDURE — 99999 PR PBB SHADOW E&M-EST. PATIENT-LVL III: CPT | Mod: PBBFAC,,, | Performed by: NEUROLOGICAL SURGERY

## 2022-12-20 PROCEDURE — 1159F PR MEDICATION LIST DOCUMENTED IN MEDICAL RECORD: ICD-10-PCS | Mod: CPTII,S$GLB,, | Performed by: NEUROLOGICAL SURGERY

## 2022-12-20 PROCEDURE — 3075F SYST BP GE 130 - 139MM HG: CPT | Mod: CPTII,S$GLB,, | Performed by: NEUROLOGICAL SURGERY

## 2022-12-20 PROCEDURE — 3079F PR MOST RECENT DIASTOLIC BLOOD PRESSURE 80-89 MM HG: ICD-10-PCS | Mod: CPTII,S$GLB,, | Performed by: NEUROLOGICAL SURGERY

## 2022-12-20 PROCEDURE — 99214 PR OFFICE/OUTPT VISIT, EST, LEVL IV, 30-39 MIN: ICD-10-PCS | Mod: S$GLB,,, | Performed by: NEUROLOGICAL SURGERY

## 2022-12-20 PROCEDURE — 1159F MED LIST DOCD IN RCRD: CPT | Mod: CPTII,S$GLB,, | Performed by: NEUROLOGICAL SURGERY

## 2022-12-20 PROCEDURE — 1160F PR REVIEW ALL MEDS BY PRESCRIBER/CLIN PHARMACIST DOCUMENTED: ICD-10-PCS | Mod: CPTII,S$GLB,, | Performed by: NEUROLOGICAL SURGERY

## 2022-12-20 PROCEDURE — 1160F RVW MEDS BY RX/DR IN RCRD: CPT | Mod: CPTII,S$GLB,, | Performed by: NEUROLOGICAL SURGERY

## 2022-12-20 PROCEDURE — 3075F PR MOST RECENT SYSTOLIC BLOOD PRESS GE 130-139MM HG: ICD-10-PCS | Mod: CPTII,S$GLB,, | Performed by: NEUROLOGICAL SURGERY

## 2022-12-20 PROCEDURE — 99214 OFFICE O/P EST MOD 30 MIN: CPT | Mod: S$GLB,,, | Performed by: NEUROLOGICAL SURGERY

## 2022-12-20 NOTE — PROGRESS NOTES
Subjective:   I, Colleen Romero, attest that this documentation has been prepared under the direction and in the presence of Chago Van MD.     Patient ID: Дмитрий Winn is a 37 y.o. male     Chief Complaint: Follow-up      HPI  Mr. Дмитрий Winn is a 37 y.o. gentleman with history of epilepsy, right intracranial craniotomy (8/10/2018) performed by Lefty Paulson MD, pathology reported Astrocytoma WHO Grade II, and s/p right frontal craniotomy for astrocytoma done on 2/17/2022, who presents today for 3 month follow up astrocytoma with MRI brain. This is a pt who presented to [facility] ED on 8/8/2018, for a seizure. Neuroimaging showed a large frontal mass with an unusual appearance suspicious for low grade glioma or neuroglial cyst. A right supraorbital frontal craniotomy and resection of gyrus rectus tumor was done on 8/10/2018. 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 Pt continued to be monitored and followed up with MRI brain frequently. The pt presented to me on 3/9/21 for a recent MRI brain that revealed increased in size glioma. We elected for removal of the tumor. At the time of our last visit on 9/20/2022, the pt reports he is doing well. Patient has returned to work and is staying active at home. He states his current regimen is tolerable. Notes of some fatigue following his current dose but states this is manageable. Patient denies any nausea or vomiting. He does state one episode of vomiting but he attributes this to missing his dose of Temozolomide for the day. Otherwise, pt has no new complaints or concerns at this time.    Today the pt reports he continues to do well in the interim. Patient states his functional status is at baseline. He has been kept occupied with work. Denies any recent headaches or seizures.    Review of Systems   Constitutional:  Negative for activity change, appetite change, fatigue, fever and unexpected weight change.   HENT:  Negative  for facial swelling.    Eyes: Negative.    Respiratory: Negative.     Cardiovascular: Negative.    Gastrointestinal:  Negative for diarrhea, nausea and vomiting.   Endocrine: Negative.    Genitourinary: Negative.    Musculoskeletal:  Negative for back pain, joint swelling, myalgias and neck pain.   Neurological:  Negative for dizziness, seizures, weakness, numbness and headaches.   Psychiatric/Behavioral: Negative.        Past Medical History:   Diagnosis Date    Ataxia 5/29/2020    Cancer     brain tumor    Epilepsy     Normocytic anemia 3/8/2022       Objective:      Vitals:    12/20/22 0912   BP: 130/87   Pulse: (!) 59   Temp: 97.7 °F (36.5 °C)      Physical Exam  Constitutional:       General: He is not in acute distress.     Appearance: Normal appearance.   HENT:      Head: Normocephalic and atraumatic.   Pulmonary:      Effort: Pulmonary effort is normal.   Musculoskeletal:      Cervical back: Neck supple.   Neurological:      Mental Status: He is alert and oriented to person, place, and time.      GCS: GCS eye subscore is 4. GCS verbal subscore is 5. GCS motor subscore is 6.      Cranial Nerves: No cranial nerve deficit.     IMAGING:  MRI Brain W WO Contrast (12/19/2022):  1. Stable postoperative changes of right frontal astrocytoma resection.  Residual lobular T2/T2 FLAIR hyperintense areas along the anterior and posterior margin of the resection cavity are unchanged and may represent residual tumor.  2. No new or acute abnormality.    I have personally reviewed the images with the pt.      I, Dr. Chago Van, personally performed the services described in this documentation. All medical record entries made by the scribe, Colleen Romero, were at my direction and in my presence.  I have reviewed the chart and agree that the record reflects my personal performance and is accurate and complete. Chago Van MD. 12/20/2022    Assessment:       Astrocytoma.     Plan:   I have personally reviewed the MRI brain  with the pt which shows:  1. Stable postoperative changes of right frontal astrocytoma resection.  Residual lobular T2/T2 FLAIR hyperintense areas along the anterior and posterior margin of the resection cavity are unchanged and may represent residual tumor.  2. No new or acute abnormality.    I will schedule the patient for 6 month follow up with MRI brain.

## 2022-12-20 NOTE — PATIENT INSTRUCTIONS
I have personally reviewed the MRI brain with the pt which shows:  1. Stable postoperative changes of right frontal astrocytoma resection.  Residual lobular T2/T2 FLAIR hyperintense areas along the anterior and posterior margin of the resection cavity are unchanged and may represent residual tumor.  2. No new or acute abnormality.    I will schedule the patient for 6 month follow up with MRI brain.

## 2022-12-20 NOTE — TELEPHONE ENCOUNTER
Outgoing call regarding Temodar refill, pt stated he does not need them until weeks. Informed pt will follow up on 12/27 to set up refill.

## 2022-12-27 ENCOUNTER — PATIENT MESSAGE (OUTPATIENT)
Dept: PHARMACY | Facility: CLINIC | Age: 37
End: 2022-12-27
Payer: COMMERCIAL

## 2022-12-30 ENCOUNTER — PATIENT MESSAGE (OUTPATIENT)
Dept: PHARMACY | Facility: CLINIC | Age: 37
End: 2022-12-30
Payer: COMMERCIAL

## 2022-12-30 NOTE — TELEPHONE ENCOUNTER
Specialty Pharmacy - Refill Coordination    Specialty Medication Orders Linked to Encounter      Flowsheet Row Most Recent Value   Medication #1 temozolomide (TEMODAR) 20 MG capsule (Order#[id number], Rx#[id number])   Medication #2 temozolomide (TEMODAR) 100 MG capsule (Order#[id number], Rx#[id number])   Medication #3 temozolomide (TEMODAR) 250 MG capsule (Order#[id number], Rx#[id number])            Refill Questions - Documented Responses      Flowsheet Row Most Recent Value   Patient Availability and HIPAA Verification    Does patient want to proceed with activity? Yes   HIPAA/medical authority confirmed? Yes   Relationship to patient of person spoken to? Self   Refill Screening Questions    Changes to allergies? No   Changes to medications? No   New conditions since last clinic visit? No   Unplanned office visit, urgent care, ED, or hospital admission in the last 4 weeks? No   How does patient/caregiver feel medication is working? Good   Financial problems or insurance changes? No   How many doses of your specialty medications were missed in the last 4 weeks? 0   Would patient like to speak to a pharmacist? No   When does the patient need to receive the medication? 01/07/23   Refill Delivery Questions    How will the patient receive the medication? [facility]   When does the patient need to receive the medication? 01/07/23   Shipping Address Home   Address in [facility] confirmed and updated if neccessary? Yes   Expected Copay ($) 0   Is the patient able to afford the medication copay? Yes   Payment Method zero copay   Days supply of Refill 28   Supplies needed? No supplies needed   Refill activity completed? Yes   Refill activity plan Refill scheduled   Shipment/Pickup Date: 01/03/23            Current Outpatient Medications   Medication Sig    levETIRAcetam (KEPPRA) 750 MG Tab Take 1 tablet (750 mg total) by mouth 2 (two) times daily.    ondansetron (ZOFRAN) 8 MG tablet Take 1 tablet (8 mg total) by mouth  every 8 (eight) hours as needed for Nausea.    temozolomide (TEMODAR) 100 MG capsule Take 1 capsule (100 mg total) by mouth once daily as directed days 1-5 of every 28 day cycle. Take on an empty stomach. with 2 other temozolomide prescriptions for 370 mg total.    temozolomide (TEMODAR) 20 MG capsule Take 1 capsule (20 mg total) by mouth once daily as directed days 1-5 of every 28 day cycle. Take on an empty stomach. with 2 other temozolomide prescriptions for 370 mg total.    temozolomide (TEMODAR) 250 MG capsule Take 1 capsule (250 mg total) by mouth once daily as directed days 1-5 of every 28 day cycle. Take on an empty stomach with 2 other temozolomide prescriptions for 370 mg total.   Last reviewed on 12/20/2022 10:47 AM by Chago Van MD    Review of patient's allergies indicates:  No Known Allergies Last reviewed on  12/20/2022 10:48 AM by Chago Van      Tasks added this encounter   1/28/2023 - Refill Call (Auto Added)   Tasks due within next 3 months   No tasks due.     Lizabeth Garcia, PharmD  [facility]  [address] [facility] [address]  Phone: [phone]  Fax: [phone]

## 2023-01-06 ENCOUNTER — LAB VISIT (OUTPATIENT)
Dept: LAB | Facility: HOSPITAL | Age: 38
End: 2023-01-06
Attending: STUDENT IN AN ORGANIZED HEALTH CARE EDUCATION/TRAINING PROGRAM
Payer: COMMERCIAL

## 2023-01-06 ENCOUNTER — OFFICE VISIT (OUTPATIENT)
Dept: NEUROSURGERY | Facility: CLINIC | Age: 38
End: 2023-01-06
Payer: COMMERCIAL

## 2023-01-06 VITALS
BODY MASS INDEX: 25.47 KG/M2 | SYSTOLIC BLOOD PRESSURE: 145 MMHG | HEIGHT: 66 IN | OXYGEN SATURATION: 100 % | DIASTOLIC BLOOD PRESSURE: 82 MMHG | WEIGHT: 158.5 LBS | HEART RATE: 80 BPM

## 2023-01-06 DIAGNOSIS — R74.8 ELEVATED LIVER ENZYMES: ICD-10-CM

## 2023-01-06 DIAGNOSIS — C71.9 ASTROCYTOMA BRAIN TUMOR: ICD-10-CM

## 2023-01-06 DIAGNOSIS — R56.9 SEIZURES: ICD-10-CM

## 2023-01-06 DIAGNOSIS — C71.1 MALIGNANT ASTROCYTOMA OF FRONTAL LOBE: Primary | ICD-10-CM

## 2023-01-06 LAB
ALBUMIN SERPL BCP-MCNC: 4.3 G/DL (ref 3.5–5.2)
ALP SERPL-CCNC: 91 U/L (ref 55–135)
ALT SERPL W/O P-5'-P-CCNC: 19 U/L (ref 10–44)
ANION GAP SERPL CALC-SCNC: 12 MMOL/L (ref 8–16)
AST SERPL-CCNC: 18 U/L (ref 10–40)
BASOPHILS # BLD AUTO: 0.04 K/UL (ref 0–0.2)
BASOPHILS NFR BLD: 0.9 % (ref 0–1.9)
BILIRUB SERPL-MCNC: 0.5 MG/DL (ref 0.1–1)
BUN SERPL-MCNC: 9 MG/DL (ref 6–20)
CALCIUM SERPL-MCNC: 9.9 MG/DL (ref 8.7–10.5)
CHLORIDE SERPL-SCNC: 102 MMOL/L (ref 95–110)
CO2 SERPL-SCNC: 25 MMOL/L (ref 23–29)
CREAT SERPL-MCNC: 1 MG/DL (ref 0.5–1.4)
DIFFERENTIAL METHOD: ABNORMAL
EOSINOPHIL # BLD AUTO: 0 K/UL (ref 0–0.5)
EOSINOPHIL NFR BLD: 0.7 % (ref 0–8)
ERYTHROCYTE [DISTWIDTH] IN BLOOD BY AUTOMATED COUNT: 12.5 % (ref 11.5–14.5)
EST. GFR  (NO RACE VARIABLE): >60 ML/MIN/1.73 M^2
GLUCOSE SERPL-MCNC: 106 MG/DL (ref 70–110)
HCT VFR BLD AUTO: 40.7 % (ref 40–54)
HGB BLD-MCNC: 14 G/DL (ref 14–18)
IMM GRANULOCYTES # BLD AUTO: 0.02 K/UL (ref 0–0.04)
IMM GRANULOCYTES NFR BLD AUTO: 0.5 % (ref 0–0.5)
LYMPHOCYTES # BLD AUTO: 0.7 K/UL (ref 1–4.8)
LYMPHOCYTES NFR BLD: 16.3 % (ref 18–48)
MCH RBC QN AUTO: 32 PG (ref 27–31)
MCHC RBC AUTO-ENTMCNC: 34.4 G/DL (ref 32–36)
MCV RBC AUTO: 93 FL (ref 82–98)
MONOCYTES # BLD AUTO: 0.7 K/UL (ref 0.3–1)
MONOCYTES NFR BLD: 15.4 % (ref 4–15)
NEUTROPHILS # BLD AUTO: 2.8 K/UL (ref 1.8–7.7)
NEUTROPHILS NFR BLD: 66.2 % (ref 38–73)
NRBC BLD-RTO: 0 /100 WBC
PLATELET # BLD AUTO: 255 K/UL (ref 150–450)
PMV BLD AUTO: 10.7 FL (ref 9.2–12.9)
POTASSIUM SERPL-SCNC: 4.3 MMOL/L (ref 3.5–5.1)
PROT SERPL-MCNC: 7.4 G/DL (ref 6–8.4)
RBC # BLD AUTO: 4.38 M/UL (ref 4.6–6.2)
SODIUM SERPL-SCNC: 139 MMOL/L (ref 136–145)
WBC # BLD AUTO: 4.29 K/UL (ref 3.9–12.7)

## 2023-01-06 PROCEDURE — 3077F PR MOST RECENT SYSTOLIC BLOOD PRESSURE >= 140 MM HG: ICD-10-PCS | Mod: CPTII,S$GLB,, | Performed by: PSYCHIATRY & NEUROLOGY

## 2023-01-06 PROCEDURE — 1159F PR MEDICATION LIST DOCUMENTED IN MEDICAL RECORD: ICD-10-PCS | Mod: CPTII,S$GLB,, | Performed by: PSYCHIATRY & NEUROLOGY

## 2023-01-06 PROCEDURE — 99999 PR PBB SHADOW E&M-EST. PATIENT-LVL III: ICD-10-PCS | Mod: PBBFAC,,, | Performed by: PSYCHIATRY & NEUROLOGY

## 2023-01-06 PROCEDURE — 36415 COLL VENOUS BLD VENIPUNCTURE: CPT | Performed by: STUDENT IN AN ORGANIZED HEALTH CARE EDUCATION/TRAINING PROGRAM

## 2023-01-06 PROCEDURE — 3008F BODY MASS INDEX DOCD: CPT | Mod: CPTII,S$GLB,, | Performed by: PSYCHIATRY & NEUROLOGY

## 2023-01-06 PROCEDURE — 99215 OFFICE O/P EST HI 40 MIN: CPT | Mod: S$GLB,,, | Performed by: PSYCHIATRY & NEUROLOGY

## 2023-01-06 PROCEDURE — 3008F PR BODY MASS INDEX (BMI) DOCUMENTED: ICD-10-PCS | Mod: CPTII,S$GLB,, | Performed by: PSYCHIATRY & NEUROLOGY

## 2023-01-06 PROCEDURE — 99215 PR OFFICE/OUTPT VISIT, EST, LEVL V, 40-54 MIN: ICD-10-PCS | Mod: S$GLB,,, | Performed by: PSYCHIATRY & NEUROLOGY

## 2023-01-06 PROCEDURE — 3079F DIAST BP 80-89 MM HG: CPT | Mod: CPTII,S$GLB,, | Performed by: PSYCHIATRY & NEUROLOGY

## 2023-01-06 PROCEDURE — 1159F MED LIST DOCD IN RCRD: CPT | Mod: CPTII,S$GLB,, | Performed by: PSYCHIATRY & NEUROLOGY

## 2023-01-06 PROCEDURE — 3079F PR MOST RECENT DIASTOLIC BLOOD PRESSURE 80-89 MM HG: ICD-10-PCS | Mod: CPTII,S$GLB,, | Performed by: PSYCHIATRY & NEUROLOGY

## 2023-01-06 PROCEDURE — 85025 COMPLETE CBC W/AUTO DIFF WBC: CPT | Performed by: STUDENT IN AN ORGANIZED HEALTH CARE EDUCATION/TRAINING PROGRAM

## 2023-01-06 PROCEDURE — 3077F SYST BP >= 140 MM HG: CPT | Mod: CPTII,S$GLB,, | Performed by: PSYCHIATRY & NEUROLOGY

## 2023-01-06 PROCEDURE — 99999 PR PBB SHADOW E&M-EST. PATIENT-LVL III: CPT | Mod: PBBFAC,,, | Performed by: PSYCHIATRY & NEUROLOGY

## 2023-01-06 PROCEDURE — 80053 COMPREHEN METABOLIC PANEL: CPT | Performed by: STUDENT IN AN ORGANIZED HEALTH CARE EDUCATION/TRAINING PROGRAM

## 2023-01-06 NOTE — PROGRESS NOTES
Subjective:       Patient ID: Дмитрий Winn is a 37 y.o. male.    Chief Complaint: brain tumor    Oncologic History  8/2018: had a first time seizure at his parent's house and was found to have a right frontal mass. Subtotal resection performed at [facility] revealed astrocytoma, IDH-mutant, WHO grade 2.  1/29/2022: surveillance imaging revealed growth of lesion  2/17/2022: repeat subtotal resection by Chago Van; pathology consistent with recurrent astrocytoma, IDH-mutant, WHO grade 2  5/9-6/20/2022: chemoradiation to 54Gy over 30 fractions  7/2022: C1 TMZ @150mg/m2  8/2022: C2 TMZ @150mg/m2 (dose inadvertently not increased)  10/23/2022: C3 TMZ @200mg/m2 (delayed due to covid exposure)  11/2022: C4 TMZ @200mg/m2    Currently, Дмитрий is doing well. He remains seizure free on levetiracetam. No side effects  No new symptoms.    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 60 tablet 11    ondansetron (ZOFRAN) 8 MG tablet Take 1 tablet (8 mg total) by mouth every 8 (eight) hours as needed for Nausea. 30 tablet 3    temozolomide (TEMODAR) 100 MG capsule Take 1 capsule (100 mg total) by mouth once daily as directed days 1-5 of every 28 day cycle. Take on an empty stomach. with 2 other temozolomide prescriptions for 370 mg total. 5 capsule 11    temozolomide (TEMODAR) 20 MG capsule Take 1 capsule (20 mg total) by mouth once daily as directed days 1-5 of every 28 day cycle. Take on an empty stomach. with 2 other temozolomide prescriptions for 370 mg total. 5 capsule 11    temozolomide (TEMODAR) 250 MG capsule Take 1 capsule (250 mg total) by mouth once daily as directed days 1-5 of every 28 day cycle. Take on an empty stomach with 2 other temozolomide  prescriptions for 370 mg total. 5 capsule 11     No current facility-administered medications for this visit.      Past Surgical History:   Procedure Laterality Date    CRANIOTOMY FOR EXCISION OF INTRACRANIAL TUMOR Right 8/10/2018    Procedure: CRANIOTOMY, FOR INTRACRANIAL NEOPLASM EXCISION Right eyebrow incision / LAYNE [facility] craniotomy for tumor resection;  Surgeon: Lefty Paulson MD;  Location: [facility] OR;  Service: Neurosurgery;  Laterality: Right;    CRANIOTOMY USING FRAMELESS STEREOTAXY Right 2022    Procedure: CRANIOTOMY, USING FRAMELESS STEREOTAXY;  Surgeon: Chago Van MD;  [facility];  Service: Neurosurgery;  Laterality: Right;    cyst removed      SURGICAL REMOVAL OF PILONIDAL CYST        Family History   Problem Relation Age of Onset    Cancer Maternal Grandfather     Prostate cancer Maternal Grandfather     Cancer Paternal Grandmother     Breast cancer Paternal Grandmother     Hypertension Mother     No Known Problems Sister     No Known Problems Brother       Social History     Tobacco Use    Smoking status: Former     Packs/day: 1.00     Years: 8.00     Pack years: 8.00     Types: Cigarettes     Start date:      Quit date: 2018     Years since quittin.4    Smokeless tobacco: Never   Substance Use Topics    Alcohol use: Yes     Alcohol/week: 42.0 standard drinks     Types: 42 Cans of beer per week     Comment: 6 pack daily     Drug use: No      Review of Systems   Constitutional:  Negative for fatigue.   HENT:  Negative for voice change.    Respiratory:  Negative for shortness of breath.    Cardiovascular:  Negative for leg swelling.   Gastrointestinal:  Negative for nausea.   Musculoskeletal:  Negative for gait problem.   Neurological:  Negative for tremors, seizures, weakness, numbness, coordination difficulties and coordination difficulties.   Psychiatric/Behavioral:  Negative for behavioral problems.    KPS: 100      Objective:      Vitals:    23 1323    BP: (!) 145/82   Pulse: 80              NEUROLOGICAL EXAMINATION:     MENTAL STATUS   Follows 3 step commands.   Attention: normal. Concentration: normal.   Speech: speech is normal   Level of consciousness: alert  Knowledge: good.     CRANIAL NERVES   Cranial nerves II through XII intact.     MOTOR EXAM   Muscle bulk: normal  Overall muscle tone: normal    Strength   Strength 5/5 throughout.     REFLEXES     Reflexes   Right biceps: 2+  Left biceps: 2+  Right triceps: 2+  Left triceps: 2+  Right patellar: 2+  Left patellar: 2+  Right achilles: 2+  Left achilles: 2+       (Prior)     SENSORY EXAM   Light touch normal.        (Prior)     GAIT AND COORDINATION     Gait  Gait: normal    Tremor   Resting tremor: absent  Intention tremor: absent  Action tremor: absent     Imaging:  MRI brain from 12/19/2022 was personally visualized and compared to prior from 9/19/2022. Pertinent for the following:  Impression:  1. Stable postoperative changes of right frontal astrocytoma resection.  Residual lobular T2/T2 FLAIR hyperintense areas along the anterior and posterior margin of the resection cavity are unchanged and may represent residual tumor.  2. No new or acute abnormality..    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 male with a grade 2 astrocytoma, IDH-mutant s/p subtotal resection 8/2018, re-resection 2/2022, chemoradiation completed 6/2022, and ongoing adjuvant TMZ presenting for continued treatment. Tumor diagnosis was heralded by seizures. Currently Дмитрий is asymptomatic.    Astrocytoma, IDH-mutant, WHO grade 2  Дмитрий's labs are currently adequate for continued therapy. Continue cycle 5 temozolomide dose at 200mg/m2 = 370mg nightly for 5 night. He will pre-treat with ondansetron 8mg  30-45 min prior to chemotherapy dose. He will return to clinic in 4 weeks with CBC/CMP. Next MRI brain planned for 3/2023.    Elevated liver enzymes  AST/ALT mildly elevated at last visit. May be due to TMZ and/or EtOH use. Resolved on labs today. CTM.    Seizures  Has had 2 seizures- one in 2018 heralding tumor diagnosis and one when he was taken off levetiracetam. No seizures on current AED dose. Continue levetiracetam 750mg bid.          40 minutes of total time spent on the encounter, which includes face to face time and non-face to face time preparing to see the patient (eg, review of tests), Obtaining and/or reviewing separately obtained history, Documenting clinical information in the electronic or other health record, Independently interpreting results (not separately reported) and communicating results to the patient/family/caregiver, or Care coordination (not separately reported).     Carleen Stoddard MD  Neuro-Oncology

## 2023-01-30 ENCOUNTER — PATIENT MESSAGE (OUTPATIENT)
Dept: PHARMACY | Facility: CLINIC | Age: 38
End: 2023-01-30
Payer: COMMERCIAL

## 2023-01-31 ENCOUNTER — SPECIALTY PHARMACY (OUTPATIENT)
Dept: PHARMACY | Facility: CLINIC | Age: 38
End: 2023-01-31
Payer: COMMERCIAL

## 2023-01-31 NOTE — TELEPHONE ENCOUNTER
Specialty Pharmacy - Refill Coordination    Specialty Medication Orders Linked to Encounter      Flowsheet Row Most Recent Value   Medication #1 temozolomide (TEMODAR) 20 MG capsule (Order#[id number], Rx#[id number])   Medication #2 temozolomide (TEMODAR) 100 MG capsule (Order#[id number], Rx#[id number])   Medication #3 temozolomide (TEMODAR) 250 MG capsule (Order#[id number], Rx#[id number])            Refill Questions - Documented Responses      Flowsheet Row Most Recent Value   Patient Availability and HIPAA Verification    Does patient want to proceed with activity? Yes   HIPAA/medical authority confirmed? Yes   Relationship to patient of person spoken to? Self   Refill Screening Questions    Changes to allergies? No   Changes to medications? No   New conditions since last clinic visit? No   Unplanned office visit, urgent care, ED, or hospital admission in the last 4 weeks? No   How does patient/caregiver feel medication is working? Good   Financial problems or insurance changes? No   How many doses of your specialty medications were missed in the last 4 weeks? 0   Would patient like to speak to a pharmacist? No   When does the patient need to receive the medication? 02/07/23   Refill Delivery Questions    How will the patient receive the medication? MEDRx   When does the patient need to receive the medication? 02/07/23   Shipping Address Home   Address in [facility] confirmed and updated if neccessary? Yes   Expected Copay ($) 157.34   Is the patient able to afford the medication copay? Yes   Payment Method invoice (approval required)   Days supply of Refill 28   Supplies needed? No supplies needed   Refill activity completed? Yes   Refill activity plan Refill scheduled   Shipment/Pickup Date: 02/02/23            Current Outpatient Medications   Medication Sig    levETIRAcetam (KEPPRA) 750 MG Tab Take 1 tablet (750 mg total) by mouth 2 (two) times daily.    ondansetron (ZOFRAN) 8 MG tablet Take 1 tablet (8  mg total) by mouth every 8 (eight) hours as needed for Nausea.    temozolomide (TEMODAR) 100 MG capsule Take 1 capsule (100 mg total) by mouth once daily as directed days 1-5 of every 28 day cycle. Take on an empty stomach. with 2 other temozolomide prescriptions for 370 mg total.    temozolomide (TEMODAR) 20 MG capsule Take 1 capsule (20 mg total) by mouth once daily as directed days 1-5 of every 28 day cycle. Take on an empty stomach. with 2 other temozolomide prescriptions for 370 mg total.    temozolomide (TEMODAR) 250 MG capsule Take 1 capsule (250 mg total) by mouth once daily as directed days 1-5 of every 28 day cycle. Take on an empty stomach with 2 other temozolomide prescriptions for 370 mg total.   Last reviewed on 1/6/2023  1:23 PM by Zac Nuñez MA    Review of patient's allergies indicates:  No Known Allergies Last reviewed on  1/6/2023 1:23 PM by Zac Nuñez      Tasks added this encounter   2/23/2023 - Refill Call (Auto Added)   Tasks due within next 3 months   4/2/2023 - Clinical - Follow Up Assesement (180 day)     Guillermo Romero, PharmD  [facility]  [address] [place] [id number]  Phone: [phone]  Fax: [phone]

## 2023-02-07 ENCOUNTER — OFFICE VISIT (OUTPATIENT)
Dept: NEUROSURGERY | Facility: CLINIC | Age: 38
End: 2023-02-07
Payer: COMMERCIAL

## 2023-02-07 ENCOUNTER — LAB VISIT (OUTPATIENT)
Dept: LAB | Facility: HOSPITAL | Age: 38
End: 2023-02-07
Attending: STUDENT IN AN ORGANIZED HEALTH CARE EDUCATION/TRAINING PROGRAM
Payer: COMMERCIAL

## 2023-02-07 VITALS
HEIGHT: 66 IN | OXYGEN SATURATION: 100 % | WEIGHT: 161.63 LBS | BODY MASS INDEX: 25.97 KG/M2 | SYSTOLIC BLOOD PRESSURE: 147 MMHG | HEART RATE: 66 BPM | TEMPERATURE: 97 F | DIASTOLIC BLOOD PRESSURE: 91 MMHG

## 2023-02-07 DIAGNOSIS — C71.9 ASTROCYTOMA BRAIN TUMOR: ICD-10-CM

## 2023-02-07 LAB
ALBUMIN SERPL BCP-MCNC: 4.3 G/DL (ref 3.5–5.2)
ALP SERPL-CCNC: 75 U/L (ref 55–135)
ALT SERPL W/O P-5'-P-CCNC: 27 U/L (ref 10–44)
ANION GAP SERPL CALC-SCNC: 12 MMOL/L (ref 8–16)
AST SERPL-CCNC: 21 U/L (ref 10–40)
BASOPHILS # BLD AUTO: 0.02 K/UL (ref 0–0.2)
BASOPHILS NFR BLD: 0.5 % (ref 0–1.9)
BILIRUB SERPL-MCNC: 0.5 MG/DL (ref 0.1–1)
BUN SERPL-MCNC: 10 MG/DL (ref 6–20)
CALCIUM SERPL-MCNC: 9.7 MG/DL (ref 8.7–10.5)
CHLORIDE SERPL-SCNC: 105 MMOL/L (ref 95–110)
CO2 SERPL-SCNC: 22 MMOL/L (ref 23–29)
CREAT SERPL-MCNC: 1.1 MG/DL (ref 0.5–1.4)
DIFFERENTIAL METHOD: ABNORMAL
EOSINOPHIL # BLD AUTO: 0 K/UL (ref 0–0.5)
EOSINOPHIL NFR BLD: 0.7 % (ref 0–8)
ERYTHROCYTE [DISTWIDTH] IN BLOOD BY AUTOMATED COUNT: 12.8 % (ref 11.5–14.5)
EST. GFR  (NO RACE VARIABLE): >60 ML/MIN/1.73 M^2
GLUCOSE SERPL-MCNC: 104 MG/DL (ref 70–110)
HCT VFR BLD AUTO: 39.8 % (ref 40–54)
HGB BLD-MCNC: 13.7 G/DL (ref 14–18)
IMM GRANULOCYTES # BLD AUTO: 0.02 K/UL (ref 0–0.04)
IMM GRANULOCYTES NFR BLD AUTO: 0.5 % (ref 0–0.5)
LYMPHOCYTES # BLD AUTO: 0.6 K/UL (ref 1–4.8)
LYMPHOCYTES NFR BLD: 15.8 % (ref 18–48)
MCH RBC QN AUTO: 32.8 PG (ref 27–31)
MCHC RBC AUTO-ENTMCNC: 34.4 G/DL (ref 32–36)
MCV RBC AUTO: 95 FL (ref 82–98)
MONOCYTES # BLD AUTO: 0.6 K/UL (ref 0.3–1)
MONOCYTES NFR BLD: 15.1 % (ref 4–15)
NEUTROPHILS # BLD AUTO: 2.7 K/UL (ref 1.8–7.7)
NEUTROPHILS NFR BLD: 67.4 % (ref 38–73)
NRBC BLD-RTO: 0 /100 WBC
PLATELET # BLD AUTO: 165 K/UL (ref 150–450)
PMV BLD AUTO: 11.4 FL (ref 9.2–12.9)
POTASSIUM SERPL-SCNC: 4.6 MMOL/L (ref 3.5–5.1)
PROT SERPL-MCNC: 7.1 G/DL (ref 6–8.4)
RBC # BLD AUTO: 4.18 M/UL (ref 4.6–6.2)
SODIUM SERPL-SCNC: 139 MMOL/L (ref 136–145)
WBC # BLD AUTO: 4.05 K/UL (ref 3.9–12.7)

## 2023-02-07 PROCEDURE — 80053 COMPREHEN METABOLIC PANEL: CPT | Performed by: STUDENT IN AN ORGANIZED HEALTH CARE EDUCATION/TRAINING PROGRAM

## 2023-02-07 PROCEDURE — 99215 PR OFFICE/OUTPT VISIT, EST, LEVL V, 40-54 MIN: ICD-10-PCS | Mod: S$GLB,,, | Performed by: PSYCHIATRY & NEUROLOGY

## 2023-02-07 PROCEDURE — 1159F MED LIST DOCD IN RCRD: CPT | Mod: CPTII,S$GLB,, | Performed by: PSYCHIATRY & NEUROLOGY

## 2023-02-07 PROCEDURE — 99999 PR PBB SHADOW E&M-EST. PATIENT-LVL III: ICD-10-PCS | Mod: PBBFAC,,, | Performed by: PSYCHIATRY & NEUROLOGY

## 2023-02-07 PROCEDURE — 36415 COLL VENOUS BLD VENIPUNCTURE: CPT | Performed by: STUDENT IN AN ORGANIZED HEALTH CARE EDUCATION/TRAINING PROGRAM

## 2023-02-07 PROCEDURE — 3077F SYST BP >= 140 MM HG: CPT | Mod: CPTII,S$GLB,, | Performed by: PSYCHIATRY & NEUROLOGY

## 2023-02-07 PROCEDURE — 1159F PR MEDICATION LIST DOCUMENTED IN MEDICAL RECORD: ICD-10-PCS | Mod: CPTII,S$GLB,, | Performed by: PSYCHIATRY & NEUROLOGY

## 2023-02-07 PROCEDURE — 3080F PR MOST RECENT DIASTOLIC BLOOD PRESSURE >= 90 MM HG: ICD-10-PCS | Mod: CPTII,S$GLB,, | Performed by: PSYCHIATRY & NEUROLOGY

## 2023-02-07 PROCEDURE — 99999 PR PBB SHADOW E&M-EST. PATIENT-LVL III: CPT | Mod: PBBFAC,,, | Performed by: PSYCHIATRY & NEUROLOGY

## 2023-02-07 PROCEDURE — 85025 COMPLETE CBC W/AUTO DIFF WBC: CPT | Performed by: STUDENT IN AN ORGANIZED HEALTH CARE EDUCATION/TRAINING PROGRAM

## 2023-02-07 PROCEDURE — 3008F PR BODY MASS INDEX (BMI) DOCUMENTED: ICD-10-PCS | Mod: CPTII,S$GLB,, | Performed by: PSYCHIATRY & NEUROLOGY

## 2023-02-07 PROCEDURE — 3080F DIAST BP >= 90 MM HG: CPT | Mod: CPTII,S$GLB,, | Performed by: PSYCHIATRY & NEUROLOGY

## 2023-02-07 PROCEDURE — 99215 OFFICE O/P EST HI 40 MIN: CPT | Mod: S$GLB,,, | Performed by: PSYCHIATRY & NEUROLOGY

## 2023-02-07 PROCEDURE — 3077F PR MOST RECENT SYSTOLIC BLOOD PRESSURE >= 140 MM HG: ICD-10-PCS | Mod: CPTII,S$GLB,, | Performed by: PSYCHIATRY & NEUROLOGY

## 2023-02-07 PROCEDURE — 3008F BODY MASS INDEX DOCD: CPT | Mod: CPTII,S$GLB,, | Performed by: PSYCHIATRY & NEUROLOGY

## 2023-02-07 RX ORDER — ONDANSETRON HYDROCHLORIDE 8 MG/1
8 TABLET, FILM COATED ORAL EVERY 8 HOURS PRN
Qty: 30 TABLET | Refills: 3 | Status: SHIPPED | OUTPATIENT
Start: 2023-02-07

## 2023-02-07 NOTE — PROGRESS NOTES
Subjective:       Patient ID: Дмитрий Winn is a 37 y.o. male.    Chief Complaint: brain tumor    Oncologic History  8/2018: had a first time seizure at his parent's house and was found to have a right frontal mass. 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  1/29/2022: surveillance imaging revealed growth of lesion  2/17/2022: repeat subtotal resection by Chago Van; pathology consistent with recurrent astrocytoma, IDH-mutant, WHO grade 2  5/9-6/20/2022: chemoradiation to 54Gy over 30 fractions  7/2022: C1 TMZ @150mg/m2  8/2022: C2 TMZ @150mg/m2 (dose inadvertently not increased)  10/23/2022: C3 TMZ @200mg/m2 (delayed due to covid exposure)  11/2022: C4 TMZ @200mg/m2  12/2022: C5 TMZ@200mg/m2      Interval History: Patient presents to clinic prior to cycle 6 of TMZ. He reports doing well at this clinic visit and has no complaints. He tolerated his last cycle well with nausea well controlled with scheduled Zofran HS. We discussed the need for continued close surveillance with MRI Brain Q3 months and was agreeable and understanding.     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 60 tablet 11    ondansetron (ZOFRAN) 8 MG tablet Take 1 tablet (8 mg total) by mouth every 8 (eight) hours as needed for Nausea. 30 tablet 3    temozolomide (TEMODAR) 100 MG capsule Take 1 capsule (100 mg total) by mouth once daily as directed days 1-5 of every 28 day cycle. Take on an empty stomach. with 2 other temozolomide prescriptions for 370 mg total. 5 capsule 11    temozolomide (TEMODAR) 20 MG capsule Take 1 capsule (20 mg total) by mouth once daily as directed days 1-5 of every 28 day cycle. Take on an empty stomach.  with 2 other temozolomide prescriptions for 370 mg total. 5 capsule 11    temozolomide (TEMODAR) 250 MG capsule Take 1 capsule (250 mg total) by mouth once daily as directed days 1-5 of every 28 day cycle. Take on an empty stomach with 2 other temozolomide prescriptions for 370 mg total. 5 capsule 11     No current facility-administered medications for this visit.      Past Surgical History:   Procedure Laterality Date    CRANIOTOMY FOR EXCISION OF INTRACRANIAL TUMOR Right 8/10/2018    Procedure: CRANIOTOMY, FOR INTRACRANIAL NEOPLASM EXCISION Right eyebrow incision / [facility] craniotomy for tumor resection;  Surgeon: Lefty Paulson MD;  Location: [facility] OR;  Service: Neurosurgery;  Laterality: Right;    CRANIOTOMY USING FRAMELESS STEREOTAXY Right 2022    Procedure: CRANIOTOMY, USING FRAMELESS STEREOTAXY;  Surgeon: Chago Van MD;  [facility];  Service: Neurosurgery;  Laterality: Right;    cyst removed      SURGICAL REMOVAL OF PILONIDAL CYST        Family History   Problem Relation Age of Onset    Cancer Maternal Grandfather     Prostate cancer Maternal Grandfather     Cancer Paternal Grandmother     Breast cancer Paternal Grandmother     Hypertension Mother     No Known Problems Sister     No Known Problems Brother       Social History     Tobacco Use    Smoking status: Former     Packs/day: 1.00     Years: 8.00     Pack years: 8.00     Types: Cigarettes     Start date:      Quit date: 2018     Years since quittin.4    Smokeless tobacco: Never   Substance Use Topics    Alcohol use: Yes     Alcohol/week: 42.0 standard drinks     Types: 42 Cans of beer per week     Comment: 6 pack daily     Drug use: No      Review of Systems   Constitutional:  Negative for fatigue.   HENT:  Negative for voice change.    Respiratory:  Negative for shortness of breath.    Cardiovascular:  Negative for leg swelling.   Gastrointestinal:  Negative for nausea.   Musculoskeletal:  Negative for  gait problem.   Neurological:  Negative for tremors, seizures, weakness, numbness, coordination difficulties and coordination difficulties.   Psychiatric/Behavioral:  Negative for behavioral problems.    KPS: 100      Objective:      Vitals:    02/07/23 1120   BP: (!) 147/91   Pulse: 66   Temp: 97 °F (36.1 °C)              NEUROLOGICAL EXAMINATION:     MENTAL STATUS   Follows 3 step commands.   Attention: normal. Concentration: normal.   Speech: speech is normal   Level of consciousness: alert  Knowledge: good.     CRANIAL NERVES   Cranial nerves II through XII intact.     MOTOR EXAM   Muscle bulk: normal  Overall muscle tone: normal    Strength   Strength 5/5 throughout.     REFLEXES     Reflexes   Right biceps: 2+  Left biceps: 2+  Right triceps: 2+  Left triceps: 2+  Right patellar: 2+  Left patellar: 2+  Right achilles: 2+  Left achilles: 2+       (Prior)     SENSORY EXAM   Light touch normal.        (Prior)     GAIT AND COORDINATION     Gait  Gait: normal    Tremor   Resting tremor: absent  Intention tremor: absent  Action tremor: absent     Imaging:  MRI brain from 12/19/2022 was personally visualized and compared to prior from 9/19/2022. Pertinent for the following:  Impression:  1. Stable postoperative changes of right frontal astrocytoma resection.  Residual lobular T2/T2 FLAIR hyperintense areas along the anterior and posterior margin of the resection cavity are unchanged and may represent residual tumor.  2. No new or acute abnormality..    Assessment:       Problem List Items Addressed This Visit    None            Plan:       Дмитрий Winn is a 37 y.o. male with a grade 2 astrocytoma, IDH-mutant s/p subtotal resection 8/2018, re-resection 2/2022, chemoradiation completed 6/2022, and ongoing adjuvant TMZ presenting for continued treatment. Tumor diagnosis was heralded by seizures. Currently Дмитрий is asymptomatic.    Astrocytoma, IDH-mutant, WHO grade 2  Дмитрий's labs are currently  adequate for continued therapy. Continue cycle 6 temozolomide dose at 200mg/m2 = 370mg nightly for 5 night. He will pre-treat with ondansetron 8mg 30-45 min prior to chemotherapy dose. He will return to clinic in 4 weeks with CBC/CMP. Next MRI brain planned for 3/2023.    Elevated liver enzymes  Continue to monitor with CMP prior to each cycle.     Seizures  Has had 2 seizures- one in 2018 heralding tumor diagnosis and one when he was taken off levetiracetam. No seizures on current AED dose. Continue levetiracetam 750mg bid.          Yovanny Myers D.O.  Hematology/Oncology Fellow, PGY-IV

## 2023-02-23 ENCOUNTER — PATIENT MESSAGE (OUTPATIENT)
Dept: PHARMACY | Facility: CLINIC | Age: 38
End: 2023-02-23
Payer: COMMERCIAL

## 2023-02-27 ENCOUNTER — PATIENT MESSAGE (OUTPATIENT)
Dept: PHARMACY | Facility: CLINIC | Age: 38
End: 2023-02-27
Payer: COMMERCIAL

## 2023-02-28 ENCOUNTER — SPECIALTY PHARMACY (OUTPATIENT)
Dept: PHARMACY | Facility: CLINIC | Age: 38
End: 2023-02-28
Payer: COMMERCIAL

## 2023-02-28 ENCOUNTER — OFFICE VISIT (OUTPATIENT)
Dept: DERMATOLOGY | Facility: CLINIC | Age: 38
End: 2023-02-28
Payer: COMMERCIAL

## 2023-02-28 DIAGNOSIS — D48.5 NEOPLASM OF UNCERTAIN BEHAVIOR OF SKIN: Primary | ICD-10-CM

## 2023-02-28 PROCEDURE — 99999 PR PBB SHADOW E&M-EST. PATIENT-LVL III: CPT | Mod: PBBFAC,,, | Performed by: STUDENT IN AN ORGANIZED HEALTH CARE EDUCATION/TRAINING PROGRAM

## 2023-02-28 PROCEDURE — 1160F PR REVIEW ALL MEDS BY PRESCRIBER/CLIN PHARMACIST DOCUMENTED: ICD-10-PCS | Mod: CPTII,S$GLB,, | Performed by: STUDENT IN AN ORGANIZED HEALTH CARE EDUCATION/TRAINING PROGRAM

## 2023-02-28 PROCEDURE — 11102 PR TANGENTIAL BIOPSY, SKIN, SINGLE LESION: ICD-10-PCS | Mod: S$GLB,,, | Performed by: STUDENT IN AN ORGANIZED HEALTH CARE EDUCATION/TRAINING PROGRAM

## 2023-02-28 PROCEDURE — 1159F MED LIST DOCD IN RCRD: CPT | Mod: CPTII,S$GLB,, | Performed by: STUDENT IN AN ORGANIZED HEALTH CARE EDUCATION/TRAINING PROGRAM

## 2023-02-28 PROCEDURE — 99499 NO LOS: ICD-10-PCS | Mod: S$GLB,,, | Performed by: STUDENT IN AN ORGANIZED HEALTH CARE EDUCATION/TRAINING PROGRAM

## 2023-02-28 PROCEDURE — 11102 TANGNTL BX SKIN SINGLE LES: CPT | Mod: S$GLB,,, | Performed by: STUDENT IN AN ORGANIZED HEALTH CARE EDUCATION/TRAINING PROGRAM

## 2023-02-28 PROCEDURE — 88305 TISSUE EXAM BY PATHOLOGIST: ICD-10-PCS | Mod: 26,,, | Performed by: PATHOLOGY

## 2023-02-28 PROCEDURE — 99499 UNLISTED E&M SERVICE: CPT | Mod: S$GLB,,, | Performed by: STUDENT IN AN ORGANIZED HEALTH CARE EDUCATION/TRAINING PROGRAM

## 2023-02-28 PROCEDURE — 1160F RVW MEDS BY RX/DR IN RCRD: CPT | Mod: CPTII,S$GLB,, | Performed by: STUDENT IN AN ORGANIZED HEALTH CARE EDUCATION/TRAINING PROGRAM

## 2023-02-28 PROCEDURE — 88305 TISSUE EXAM BY PATHOLOGIST: CPT | Mod: 26,,, | Performed by: PATHOLOGY

## 2023-02-28 PROCEDURE — 1159F PR MEDICATION LIST DOCUMENTED IN MEDICAL RECORD: ICD-10-PCS | Mod: CPTII,S$GLB,, | Performed by: STUDENT IN AN ORGANIZED HEALTH CARE EDUCATION/TRAINING PROGRAM

## 2023-02-28 PROCEDURE — 88305 TISSUE EXAM BY PATHOLOGIST: CPT | Performed by: PATHOLOGY

## 2023-02-28 PROCEDURE — 99999 PR PBB SHADOW E&M-EST. PATIENT-LVL III: ICD-10-PCS | Mod: PBBFAC,,, | Performed by: STUDENT IN AN ORGANIZED HEALTH CARE EDUCATION/TRAINING PROGRAM

## 2023-02-28 NOTE — PROGRESS NOTES
Subjective:       Patient ID:  Дмитрий Winn is a 37 y.o. male who presents for   Chief Complaint   Patient presents with    Mole     Neck     Pt here for mole check on neck  States been there for about 8 years  Only bothersome with shaving    No h/o nmsc or mm  Family h/o melanoma - father    Mole - Initial  Affected locations: neck  Duration: 8 years  Signs / symptoms: asymptomatic  Aggravated by: shaving  Relieving factors/Treatments tried: nothing    Review of Systems   Skin:  Positive for wears hat. Negative for daily sunscreen use and activity-related sunscreen use.      Objective:    Physical Exam   Constitutional: He appears well-developed and well-nourished. No distress.   Neurological: He is alert and oriented to person, place, and time. He is not disoriented.   Psychiatric: He has a normal mood and affect.   Skin:   Areas Examined (abnormalities noted in diagram):   Neck Inspection Performed            Diagram Legend     Erythematous scaling macule/papule c/w actinic keratosis       Vascular papule c/w angioma      Pigmented verrucoid papule/plaque c/w seborrheic keratosis      Yellow umbilicated papule c/w sebaceous hyperplasia      Irregularly shaped tan macule c/w lentigo     1-2 mm smooth white papules consistent with Milia      Movable subcutaneous cyst with punctum c/w epidermal inclusion cyst      Subcutaneous movable cyst c/w pilar cyst      Firm pink to brown papule c/w dermatofibroma      Pedunculated fleshy papule(s) c/w skin tag(s)      Evenly pigmented macule c/w junctional nevus     Mildly variegated pigmented, slightly irregular-bordered macule c/w mildly atypical nevus      Flesh colored to evenly pigmented papule c/w intradermal nevus       Pink pearly papule/plaque c/w basal cell carcinoma      Erythematous hyperkeratotic cursted plaque c/w SCC      Surgical scar with no sign of skin cancer recurrence      Open and closed comedones      Inflammatory papules and pustules       Verrucoid papule consistent consistent with wart     Erythematous eczematous patches and plaques     Dystrophic onycholytic nail with subungual debris c/w onychomycosis     Umbilicated papule    Erythematous-base heme-crusted tan verrucoid plaque consistent with inflamed seborrheic keratosis     Erythematous Silvery Scaling Plaque c/w Psoriasis     See annotation        Assessment / Plan:      Pathology Orders:       Normal Orders This Visit    Specimen to Pathology, Dermatology     Questions:    Procedure Type: Dermatology and skin neoplasms    Number of Specimens: 1    ------------------------: -------------------------    Spec 1 Procedure: Biopsy    Spec 1 Clinical Impression: angioma, IDN    Spec 1 Source: left postauricular neck    Release to patient: Immediate          Neoplasm of uncertain behavior of skin  -     Specimen to Pathology, Dermatology    Shave biopsy procedure note:  Shave biopsy performed after verbal consent including risk of infection, scar, recurrence, need for additional treatment of site. Area prepped with alcohol, anesthetized with approximately 1.0cc of 1% lidocaine with epinephrine. Lesional tissue shaved with razor blade. Hemostasis achieved with application of aluminum chloride followed by hyfrecation. No complications. Dressing applied. Wound care explained.           Follow up if symptoms worsen or fail to improve.

## 2023-02-28 NOTE — PATIENT INSTRUCTIONS
Shave Biopsy Wound Care    Your doctor has performed a shave biopsy today.  A band aid and vaseline ointment has been placed over the site.  This should remain in place for NO LONGER THAN 48 hours.  It is fine to remove the bandaid after 24 hours, if the area is no longer bleeding. It is recommended that you keep the area dry (do not wet)) for the first 24 hours.  After 24 hours, wash the area with warm soap and water and apply Vaseline jelly.  Many patients prefer to use Neosporin or Bacitracin ointment.  This is acceptable; however, know that you can develop an allergy to this medication even if you have used it safely for years.  It is important to keep the area moist.  Letting it dry out and get air slows healing time, and will worsen the scar.        If you notice increasing redness, tenderness, pain, or yellow drainage at the biopsy site, please notify your doctor.  These are signs of an infection.    If your biopsy site is bleeding, apply firm pressure for 15 minutes straight.  Repeat for another 15 minutes, if it is still bleeding.   If the surgical site continues to bleed, then please contact your doctor.      For [facility] users:   You will receive your biopsy results in [facility] as soon as they are available. Please be assured that your physician/provider will review your results and will then determine what further treatment, evaluation, or planning is required. You should be contacted by your physician's/provider's office within 5 business days of receiving your results; If not, please reach out to directly. This is one more way [facility] is putting you first.     [facility] [place] [address]/ [phone] FAX/ [email or web address]

## 2023-02-28 NOTE — TELEPHONE ENCOUNTER
Specialty Pharmacy - Refill Coordination    Specialty Medication Orders Linked to Encounter      Flowsheet Row Most Recent Value   Medication #1 temozolomide (TEMODAR) 20 MG capsule (Order#[id number], Rx#[id number])   Medication #2 temozolomide (TEMODAR) 100 MG capsule (Order#[id number], Rx#[id number])   Medication #3 temozolomide (TEMODAR) 250 MG capsule (Order#[id number], Rx#[id number])            Refill Questions - Documented Responses      Flowsheet Row Most Recent Value   Patient Availability and HIPAA Verification    Does patient want to proceed with activity? Yes   HIPAA/medical authority confirmed? Yes   Relationship to patient of person spoken to? Self   Refill Screening Questions    Changes to allergies? No   Changes to medications? No   New conditions since last clinic visit? No   Unplanned office visit, urgent care, ED, or hospital admission in the last 4 weeks? No   How does patient/caregiver feel medication is working? Very good   Financial problems or insurance changes? No   How many doses of your specialty medications were missed in the last 4 weeks? 0   Would patient like to speak to a pharmacist? No   When does the patient need to receive the medication? 03/03/23   Refill Delivery Questions    How will the patient receive the medication? MEDRx   When does the patient need to receive the medication? 03/03/23   Shipping Address Home   Address in [facility] confirmed and updated if neccessary? Yes   Expected Copay ($) 157.34   Is the patient able to afford the medication copay? Yes   Payment Method invoice (approval required)   Days supply of Refill 28   Supplies needed? No supplies needed   Refill activity completed? Yes   Refill activity plan Refill scheduled   Shipment/Pickup Date: 03/01/23            Current Outpatient Medications   Medication Sig    levETIRAcetam (KEPPRA) 750 MG Tab Take 1 tablet (750 mg total) by mouth 2 (two) times daily.    ondansetron (ZOFRAN) 8 MG tablet Take 1 tablet  (8 mg total) by mouth every 8 (eight) hours as needed for Nausea.    temozolomide (TEMODAR) 100 MG capsule Take 1 capsule (100 mg total) by mouth once daily as directed days 1-5 of every 28 day cycle. Take on an empty stomach. with 2 other temozolomide prescriptions for 370 mg total.    temozolomide (TEMODAR) 20 MG capsule Take 1 capsule (20 mg total) by mouth once daily as directed days 1-5 of every 28 day cycle. Take on an empty stomach. with 2 other temozolomide prescriptions for 370 mg total.    temozolomide (TEMODAR) 250 MG capsule Take 1 capsule (250 mg total) by mouth once daily as directed days 1-5 of every 28 day cycle. Take on an empty stomach with 2 other temozolomide prescriptions for 370 mg total.   Last reviewed on 2/28/2023  1:10 PM by Cem Holder MD    Review of patient's allergies indicates:  No Known Allergies Last reviewed on  2/28/2023 1:10 PM by Cme Holder      Tasks added this encounter   No tasks added.   Tasks due within next 3 months   4/2/2023 - Clinical - Follow Up Assesement (180 day)  2/23/2023 - Refill Call (Auto Added)     Christian Kwok, PharmD  [facility]  [address] [facility] [address]  Phone: [phone]  Fax: [phone]

## 2023-03-03 LAB
FINAL PATHOLOGIC DIAGNOSIS: NORMAL
GROSS: NORMAL
Lab: NORMAL
MICROSCOPIC EXAM: NORMAL

## 2023-03-10 ENCOUNTER — OFFICE VISIT (OUTPATIENT)
Dept: NEUROSURGERY | Facility: CLINIC | Age: 38
End: 2023-03-10
Payer: COMMERCIAL

## 2023-03-10 ENCOUNTER — HOSPITAL ENCOUNTER (OUTPATIENT)
Dept: RADIOLOGY | Facility: HOSPITAL | Age: 38
Discharge: HOME | End: 2023-03-10
Attending: PSYCHIATRY & NEUROLOGY
Payer: COMMERCIAL

## 2023-03-10 VITALS
HEIGHT: 66 IN | DIASTOLIC BLOOD PRESSURE: 88 MMHG | SYSTOLIC BLOOD PRESSURE: 140 MMHG | OXYGEN SATURATION: 100 % | BODY MASS INDEX: 25.88 KG/M2 | WEIGHT: 161 LBS

## 2023-03-10 DIAGNOSIS — R56.9 SEIZURE: ICD-10-CM

## 2023-03-10 DIAGNOSIS — C71.9 ASTROCYTOMA BRAIN TUMOR: Primary | ICD-10-CM

## 2023-03-10 DIAGNOSIS — C71.9 ASTROCYTOMA BRAIN TUMOR: ICD-10-CM

## 2023-03-10 PROCEDURE — 70553 MRI BRAIN STEM W/O & W/DYE: CPT | Mod: 26,,, | Performed by: RADIOLOGY

## 2023-03-10 PROCEDURE — 99999 PR PBB SHADOW E&M-EST. PATIENT-LVL III: CPT | Mod: PBBFAC,,, | Performed by: PSYCHIATRY & NEUROLOGY

## 2023-03-10 PROCEDURE — 1159F MED LIST DOCD IN RCRD: CPT | Mod: CPTII,S$GLB,, | Performed by: PSYCHIATRY & NEUROLOGY

## 2023-03-10 PROCEDURE — 70553 MRI BRAIN STEM W/O & W/DYE: CPT | Mod: TC

## 2023-03-10 PROCEDURE — 25500020 PHARM REV CODE 255: Performed by: PSYCHIATRY & NEUROLOGY

## 2023-03-10 PROCEDURE — 70553 MRI BRAIN (TUMOR WITH PERFUSION) W W/O CONTRAST (XPD): ICD-10-PCS | Mod: 26,,, | Performed by: RADIOLOGY

## 2023-03-10 PROCEDURE — 3079F PR MOST RECENT DIASTOLIC BLOOD PRESSURE 80-89 MM HG: ICD-10-PCS | Mod: CPTII,S$GLB,, | Performed by: PSYCHIATRY & NEUROLOGY

## 2023-03-10 PROCEDURE — 1159F PR MEDICATION LIST DOCUMENTED IN MEDICAL RECORD: ICD-10-PCS | Mod: CPTII,S$GLB,, | Performed by: PSYCHIATRY & NEUROLOGY

## 2023-03-10 PROCEDURE — 99999 PR PBB SHADOW E&M-EST. PATIENT-LVL III: ICD-10-PCS | Mod: PBBFAC,,, | Performed by: PSYCHIATRY & NEUROLOGY

## 2023-03-10 PROCEDURE — 3008F BODY MASS INDEX DOCD: CPT | Mod: CPTII,S$GLB,, | Performed by: PSYCHIATRY & NEUROLOGY

## 2023-03-10 PROCEDURE — 3077F SYST BP >= 140 MM HG: CPT | Mod: CPTII,S$GLB,, | Performed by: PSYCHIATRY & NEUROLOGY

## 2023-03-10 PROCEDURE — 99214 PR OFFICE/OUTPT VISIT, EST, LEVL IV, 30-39 MIN: ICD-10-PCS | Mod: S$GLB,,, | Performed by: PSYCHIATRY & NEUROLOGY

## 2023-03-10 PROCEDURE — 3079F DIAST BP 80-89 MM HG: CPT | Mod: CPTII,S$GLB,, | Performed by: PSYCHIATRY & NEUROLOGY

## 2023-03-10 PROCEDURE — 3008F PR BODY MASS INDEX (BMI) DOCUMENTED: ICD-10-PCS | Mod: CPTII,S$GLB,, | Performed by: PSYCHIATRY & NEUROLOGY

## 2023-03-10 PROCEDURE — 3077F PR MOST RECENT SYSTOLIC BLOOD PRESSURE >= 140 MM HG: ICD-10-PCS | Mod: CPTII,S$GLB,, | Performed by: PSYCHIATRY & NEUROLOGY

## 2023-03-10 PROCEDURE — A9585 GADOBUTROL INJECTION: HCPCS | Performed by: PSYCHIATRY & NEUROLOGY

## 2023-03-10 PROCEDURE — 99214 OFFICE O/P EST MOD 30 MIN: CPT | Mod: S$GLB,,, | Performed by: PSYCHIATRY & NEUROLOGY

## 2023-03-10 RX ORDER — GADOBUTROL 604.72 MG/ML
8 INJECTION INTRAVENOUS
Status: COMPLETED | OUTPATIENT
Start: 2023-03-10 | End: 2023-03-10

## 2023-03-10 RX ADMIN — GADOBUTROL 8 ML: 604.72 INJECTION INTRAVENOUS at 11:03

## 2023-03-10 NOTE — PROGRESS NOTES
Subjective:       Patient ID: Дмитрий Winn is a 37 y.o. male.    Chief Complaint: brain tumor    Oncologic History  8/2018: had a first time seizure at his parent's house and was found to have a right frontal mass. 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  1/29/2022: surveillance imaging revealed growth of lesion  2/17/2022: repeat subtotal resection by Chago Van; pathology consistent with recurrent astrocytoma, IDH-mutant, WHO grade 2  5/9-6/20/2022: chemoradiation to 54Gy over 30 fractions  7/2022: C1 TMZ @150mg/m2  8/2022: C2 TMZ @150mg/m2 (dose inadvertently not increased)  10/23/2022: C3 TMZ @200mg/m2 (delayed due to covid exposure)  11/2022: C4 TMZ @200mg/m2  12/2022: C5 TMZ@200mg/m2  2/2023: C6 TMZ @200mg/m2    Feeling well. No new symptoms. Felt a bit nervous about the MRI.    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 60 tablet 11    ondansetron (ZOFRAN) 8 MG tablet Take 1 tablet (8 mg total) by mouth every 8 (eight) hours as needed for Nausea. 30 tablet 3    temozolomide (TEMODAR) 100 MG capsule Take 1 capsule (100 mg total) by mouth once daily as directed days 1-5 of every 28 day cycle. Take on an empty stomach. with 2 other temozolomide prescriptions for 370 mg total. 5 capsule 11    temozolomide (TEMODAR) 20 MG capsule Take 1 capsule (20 mg total) by mouth once daily as directed days 1-5 of every 28 day cycle. Take on an empty stomach. with 2 other temozolomide prescriptions for 370 mg total. 5 capsule 11    temozolomide (TEMODAR) 250 MG capsule Take 1 capsule (250 mg total) by mouth once daily as directed days 1-5 of every 28 day cycle. Take on an empty stomach with 2 other  temozolomide prescriptions for 370 mg total. 5 capsule 11     No current facility-administered medications for this visit.      Past Surgical History:   Procedure Laterality Date    CRANIOTOMY FOR EXCISION OF INTRACRANIAL TUMOR Right 8/10/2018    Procedure: CRANIOTOMY, FOR INTRACRANIAL NEOPLASM EXCISION Right eyebrow incision / LAY[facility] craniotomy for tumor resection;  Surgeon: Lefty Paulson MD;  Location: [facility];  Service: Neurosurgery;  Laterality: Right;    CRANIOTOMY USING FRAMELESS STEREOTAXY Right 2022    Procedure: CRANIOTOMY, USING FRAMELESS STEREOTAXY;  Surgeon: Chago Van MD;  [facility];  Service: Neurosurgery;  Laterality: Right;    cyst removed      SURGICAL REMOVAL OF PILONIDAL CYST        Family History   Problem Relation Age of Onset    Hypertension Mother     Melanoma Father     No Known Problems Sister     No Known Problems Brother     Cancer Maternal Grandfather     Prostate cancer Maternal Grandfather     Cancer Paternal Grandmother     Breast cancer Paternal Grandmother       Social History     Tobacco Use    Smoking status: Former     Packs/day: 1.00     Years: 8.00     Pack years: 8.00     Types: Cigarettes     Start date:      Quit date: 2018     Years since quittin.5    Smokeless tobacco: Never   Substance Use Topics    Alcohol use: Yes     Alcohol/week: 42.0 standard drinks     Types: 42 Cans of beer per week     Comment: 6 pack daily     Drug use: No      Review of Systems   Constitutional:  Negative for fatigue.   HENT:  Negative for voice change.    Respiratory:  Negative for shortness of breath.    Cardiovascular:  Negative for leg swelling.   Gastrointestinal:  Negative for nausea.   Musculoskeletal:  Negative for gait problem.   Neurological:  Negative for tremors, seizures, weakness, numbness, coordination difficulties and coordination difficulties.   Psychiatric/Behavioral:  Negative for behavioral problems.    KPS: 100       Objective:      Vitals:    03/10/23 1238   BP: (!) 140/88         NEUROLOGICAL EXAMINATION:     MENTAL STATUS   Follows 3 step commands.   Attention: normal. Concentration: normal.   Speech: speech is normal   Level of consciousness: alert  Knowledge: good.     CRANIAL NERVES   Cranial nerves II through XII intact.     MOTOR EXAM   Muscle bulk: normal  Overall muscle tone: normal    Strength   Strength 5/5 throughout.     REFLEXES     Reflexes   Right biceps: 2+  Left biceps: 2+  Right triceps: 2+  Left triceps: 2+  Right patellar: 2+  Left patellar: 2+  Right achilles: 2+  Left achilles: 2+       (Prior)     SENSORY EXAM   Light touch normal.        (Prior)     GAIT AND COORDINATION     Gait  Gait: normal    Tremor   Resting tremor: absent  Intention tremor: absent  Action tremor: absent     Imaging:  MRI brain from 3/10/2023 was personally visualized and compared to prior. This is a stable study.     Assessment:       Problem List Items Addressed This Visit          Neuro    Seizure       Oncology    Astrocytoma brain tumor - Primary             Plan:       Дмитрий Winn is a 37 y.o. male with a grade 2 astrocytoma, IDH-mutant s/p subtotal resection 8/2018, re-resection 2/2022, chemoradiation completed 6/2022, and ongoing adjuvant TMZ presenting for continued treatment. Tumor diagnosis was heralded by seizures. Currently Дмитрий is asymptomatic.    Astrocytoma, IDH-mutant, WHO grade 2  Дмитрий's labs are currently adequate for continued therapy. Continue cycle 7 temozolomide dose at 200mg/m2 = 370mg nightly for 5 night. He will pre-treat with ondansetron 8mg 30-45 min prior to chemotherapy dose. He will return to clinic in 4 weeks with CBC/CMP. Next MRI brain planned for 6/2023.    Seizures  Has had 2 seizures- one in 2018 heralding tumor diagnosis and one when he was taken off levetiracetam. No seizures on current AED dose. Continue levetiracetam 750mg bid.          30 minutes of total time spent  on the encounter, which includes face to face time and non-face to face time preparing to see the patient (eg, review of tests), Obtaining and/or reviewing separately obtained history, Documenting clinical information in the electronic or other health record, Independently interpreting results (not separately reported) and communicating results to the patient/family/caregiver, or Care coordination (not separately reported).     Carleen Stoddard MD  Neuro-Oncology

## 2023-03-24 ENCOUNTER — SPECIALTY PHARMACY (OUTPATIENT)
Dept: PHARMACY | Facility: CLINIC | Age: 38
End: 2023-03-24
Payer: COMMERCIAL

## 2023-03-30 NOTE — TELEPHONE ENCOUNTER
Specialty Pharmacy - Refill Coordination    Specialty Medication Orders Linked to Encounter      Flowsheet Row Most Recent Value   Medication #1 temozolomide (TEMODAR) 20 MG capsule (Order#[id number], Rx#[id number])   Medication #2 temozolomide (TEMODAR) 100 MG capsule (Order#[id number], Rx#[id number])   Medication #3 temozolomide (TEMODAR) 250 MG capsule (Order#[id number], Rx#[id number])            Refill Questions - Documented Responses      Flowsheet Row Most Recent Value   Patient Availability and HIPAA Verification    Does patient want to proceed with activity? Yes   HIPAA/medical authority confirmed? Yes   Relationship to patient of person spoken to? Self   Refill Screening Questions    Changes to allergies? No   Changes to medications? No   New conditions since last clinic visit? No   Unplanned office visit, urgent care, ED, or hospital admission in the last 4 weeks? No   How does patient/caregiver feel medication is working? Good   Financial problems or insurance changes? No   How many doses of your specialty medications were missed in the last 4 weeks? 0   Would patient like to speak to a pharmacist? No   When does the patient need to receive the medication? 04/06/23   Refill Delivery Questions    How will the patient receive the medication? MEDRx   When does the patient need to receive the medication? 04/06/23   Shipping Address Home   Address in [facility] confirmed and updated if neccessary? Yes   Expected Copay ($) 157.34   Is the patient able to afford the medication copay? Yes   Payment Method CC on file   Days supply of Refill 28   Supplies needed? No supplies needed   Refill activity completed? Yes   Refill activity plan Refill scheduled   Shipment/Pickup Date: 04/04/23            Current Outpatient Medications   Medication Sig    levETIRAcetam (KEPPRA) 750 MG Tab Take 1 tablet (750 mg total) by mouth 2 (two) times daily.    ondansetron (ZOFRAN) 8 MG tablet Take 1 tablet (8 mg total) by mouth  every 8 (eight) hours as needed for Nausea.    temozolomide (TEMODAR) 100 MG capsule Take 1 capsule (100 mg total) by mouth once daily as directed days 1-5 of every 28 day cycle. Take on an empty stomach. with 2 other temozolomide prescriptions for 370 mg total.    temozolomide (TEMODAR) 20 MG capsule Take 1 capsule (20 mg total) by mouth once daily as directed days 1-5 of every 28 day cycle. Take on an empty stomach. with 2 other temozolomide prescriptions for 370 mg total.    temozolomide (TEMODAR) 250 MG capsule Take 1 capsule (250 mg total) by mouth once daily as directed days 1-5 of every 28 day cycle. Take on an empty stomach with 2 other temozolomide prescriptions for 370 mg total.   Last reviewed on 3/10/2023 12:38 PM by Zac Nuñez MA    Review of patient's allergies indicates:  No Known Allergies Last reviewed on  3/10/2023 12:38 PM by Zac Nuñez      Tasks added this encounter   4/27/2023 - Refill Call (Auto Added)   Tasks due within next 3 months   4/2/2023 - Clinical - Follow Up Assesement (180 day)     Karoline Higgins, PharmD  [facility]  [address] [place] [address]  Phone: [phone]  Fax: [phone]

## 2023-04-03 ENCOUNTER — SPECIALTY PHARMACY (OUTPATIENT)
Dept: PHARMACY | Facility: CLINIC | Age: 38
End: 2023-04-03
Payer: COMMERCIAL

## 2023-04-03 NOTE — TELEPHONE ENCOUNTER
Specialty Pharmacy - Clinical Reassessment    Specialty Medication Orders Linked to Encounter      Flowsheet Row Most Recent Value   Medication #1 temozolomide (TEMODAR) 20 MG capsule (Order#[id number], Rx#[id number])   Medication #2 temozolomide (TEMODAR) 100 MG capsule (Order#[id number], Rx#[id number])   Medication #3 temozolomide (TEMODAR) 250 MG capsule (Order#[id number], Rx#[id number])          Patient Diagnosis   C71.9 - Astrocytoma brain tumor    Дмитрий Winn is a 37 y.o. male, who is followed by the specialty pharmacy service for management and education of his Temodar.  He has been on therapy with Temodar for 11 months.  I have reviewed his electronic medical record and current medication list and determined that specialty medication adjustment Is not needed at this time.    Patient has not experienced adverse events.  He Is adherent reporting 0 missed doses since last review.  Adherence has been encouraged with the following mechanism(s): proactive refill calls.  He is on target to meet goals of therapy and will continue treatment.        3/30/2023 2/28/2023 1/31/2023 12/30/2022 11/25/2022 10/11/2022 10/5/2022   Follow Up Review   # of missed doses 0 0 0 0 0  0   New Medications? No No No No No  No   New Conditions? No No No No No  No   New Allergies? No No No No No  No   Med Effective? Good Very good Good Good Good  Good   Missed activities?      No    Urgent Care? No No No No No  No   Requested Pharmacist? No No No No No  No            Therapy is appropriate to continue.    Therapy is effective: Yes  On scale of 1 to 10, how does patient rank quality of life? (10 - Best): Unable to Assess  Recommendations: none at this time.  Review Method: Chart Review    Tasks added this encounter   9/23/2023 - Clinical - Follow Up Assesement (180 day)   Tasks due within next 3 months   4/27/2023 - Refill Call (Auto Added)     Brandi Caicedo, PharmD  [facility]  [facility] [address]  [facility] [address]  [facility] [address]  Phone: [phone]  Fax: [phone]

## 2023-04-11 ENCOUNTER — OFFICE VISIT (OUTPATIENT)
Dept: NEUROSURGERY | Facility: CLINIC | Age: 38
End: 2023-04-11
Payer: COMMERCIAL

## 2023-04-11 ENCOUNTER — LAB VISIT (OUTPATIENT)
Dept: LAB | Facility: HOSPITAL | Age: 38
End: 2023-04-11
Payer: COMMERCIAL

## 2023-04-11 VITALS
HEART RATE: 69 BPM | DIASTOLIC BLOOD PRESSURE: 89 MMHG | WEIGHT: 158.75 LBS | BODY MASS INDEX: 25.62 KG/M2 | SYSTOLIC BLOOD PRESSURE: 137 MMHG

## 2023-04-11 DIAGNOSIS — C71.1 MALIGNANT ASTROCYTOMA OF FRONTAL LOBE: Primary | ICD-10-CM

## 2023-04-11 DIAGNOSIS — C71.9 ASTROCYTOMA BRAIN TUMOR: Primary | ICD-10-CM

## 2023-04-11 DIAGNOSIS — R56.9 SEIZURE: ICD-10-CM

## 2023-04-11 DIAGNOSIS — C71.9 ASTROCYTOMA BRAIN TUMOR: ICD-10-CM

## 2023-04-11 LAB
ALBUMIN SERPL BCP-MCNC: 4.5 G/DL (ref 3.5–5.2)
ALP SERPL-CCNC: 89 U/L (ref 55–135)
ALT SERPL W/O P-5'-P-CCNC: 37 U/L (ref 10–44)
ANION GAP SERPL CALC-SCNC: 10 MMOL/L (ref 8–16)
AST SERPL-CCNC: 28 U/L (ref 10–40)
BASOPHILS # BLD AUTO: 0.02 K/UL (ref 0–0.2)
BASOPHILS NFR BLD: 0.4 % (ref 0–1.9)
BILIRUB SERPL-MCNC: 0.5 MG/DL (ref 0.1–1)
BUN SERPL-MCNC: 9 MG/DL (ref 6–20)
CALCIUM SERPL-MCNC: 9.6 MG/DL (ref 8.7–10.5)
CHLORIDE SERPL-SCNC: 102 MMOL/L (ref 95–110)
CO2 SERPL-SCNC: 24 MMOL/L (ref 23–29)
CREAT SERPL-MCNC: 1.1 MG/DL (ref 0.5–1.4)
DIFFERENTIAL METHOD: ABNORMAL
EOSINOPHIL # BLD AUTO: 0.1 K/UL (ref 0–0.5)
EOSINOPHIL NFR BLD: 0.9 % (ref 0–8)
ERYTHROCYTE [DISTWIDTH] IN BLOOD BY AUTOMATED COUNT: 12 % (ref 11.5–14.5)
EST. GFR  (NO RACE VARIABLE): >60 ML/MIN/1.73 M^2
GLUCOSE SERPL-MCNC: 101 MG/DL (ref 70–110)
HCT VFR BLD AUTO: 42.5 % (ref 40–54)
HGB BLD-MCNC: 14.6 G/DL (ref 14–18)
IMM GRANULOCYTES # BLD AUTO: 0.02 K/UL (ref 0–0.04)
IMM GRANULOCYTES NFR BLD AUTO: 0.4 % (ref 0–0.5)
LYMPHOCYTES # BLD AUTO: 0.9 K/UL (ref 1–4.8)
LYMPHOCYTES NFR BLD: 17.2 % (ref 18–48)
MCH RBC QN AUTO: 32.9 PG (ref 27–31)
MCHC RBC AUTO-ENTMCNC: 34.4 G/DL (ref 32–36)
MCV RBC AUTO: 96 FL (ref 82–98)
MONOCYTES # BLD AUTO: 0.6 K/UL (ref 0.3–1)
MONOCYTES NFR BLD: 11 % (ref 4–15)
NEUTROPHILS # BLD AUTO: 3.7 K/UL (ref 1.8–7.7)
NEUTROPHILS NFR BLD: 70.1 % (ref 38–73)
NRBC BLD-RTO: 0 /100 WBC
PLATELET # BLD AUTO: 213 K/UL (ref 150–450)
PMV BLD AUTO: 10.8 FL (ref 9.2–12.9)
POTASSIUM SERPL-SCNC: 5 MMOL/L (ref 3.5–5.1)
PROT SERPL-MCNC: 7.5 G/DL (ref 6–8.4)
RBC # BLD AUTO: 4.44 M/UL (ref 4.6–6.2)
SODIUM SERPL-SCNC: 136 MMOL/L (ref 136–145)
WBC # BLD AUTO: 5.29 K/UL (ref 3.9–12.7)

## 2023-04-11 PROCEDURE — 3008F BODY MASS INDEX DOCD: CPT | Mod: CPTII,S$GLB,, | Performed by: PSYCHIATRY & NEUROLOGY

## 2023-04-11 PROCEDURE — 3079F PR MOST RECENT DIASTOLIC BLOOD PRESSURE 80-89 MM HG: ICD-10-PCS | Mod: CPTII,S$GLB,, | Performed by: PSYCHIATRY & NEUROLOGY

## 2023-04-11 PROCEDURE — 99214 PR OFFICE/OUTPT VISIT, EST, LEVL IV, 30-39 MIN: ICD-10-PCS | Mod: S$GLB,,, | Performed by: PSYCHIATRY & NEUROLOGY

## 2023-04-11 PROCEDURE — 99999 PR PBB SHADOW E&M-EST. PATIENT-LVL III: ICD-10-PCS | Mod: PBBFAC,,, | Performed by: PSYCHIATRY & NEUROLOGY

## 2023-04-11 PROCEDURE — 99214 OFFICE O/P EST MOD 30 MIN: CPT | Mod: S$GLB,,, | Performed by: PSYCHIATRY & NEUROLOGY

## 2023-04-11 PROCEDURE — 36415 COLL VENOUS BLD VENIPUNCTURE: CPT | Performed by: STUDENT IN AN ORGANIZED HEALTH CARE EDUCATION/TRAINING PROGRAM

## 2023-04-11 PROCEDURE — 3079F DIAST BP 80-89 MM HG: CPT | Mod: CPTII,S$GLB,, | Performed by: PSYCHIATRY & NEUROLOGY

## 2023-04-11 PROCEDURE — 1159F MED LIST DOCD IN RCRD: CPT | Mod: CPTII,S$GLB,, | Performed by: PSYCHIATRY & NEUROLOGY

## 2023-04-11 PROCEDURE — 3008F PR BODY MASS INDEX (BMI) DOCUMENTED: ICD-10-PCS | Mod: CPTII,S$GLB,, | Performed by: PSYCHIATRY & NEUROLOGY

## 2023-04-11 PROCEDURE — 80053 COMPREHEN METABOLIC PANEL: CPT | Performed by: STUDENT IN AN ORGANIZED HEALTH CARE EDUCATION/TRAINING PROGRAM

## 2023-04-11 PROCEDURE — 99999 PR PBB SHADOW E&M-EST. PATIENT-LVL III: CPT | Mod: PBBFAC,,, | Performed by: PSYCHIATRY & NEUROLOGY

## 2023-04-11 PROCEDURE — 85025 COMPLETE CBC W/AUTO DIFF WBC: CPT | Performed by: STUDENT IN AN ORGANIZED HEALTH CARE EDUCATION/TRAINING PROGRAM

## 2023-04-11 PROCEDURE — 3075F PR MOST RECENT SYSTOLIC BLOOD PRESS GE 130-139MM HG: ICD-10-PCS | Mod: CPTII,S$GLB,, | Performed by: PSYCHIATRY & NEUROLOGY

## 2023-04-11 PROCEDURE — 1159F PR MEDICATION LIST DOCUMENTED IN MEDICAL RECORD: ICD-10-PCS | Mod: CPTII,S$GLB,, | Performed by: PSYCHIATRY & NEUROLOGY

## 2023-04-11 PROCEDURE — 3075F SYST BP GE 130 - 139MM HG: CPT | Mod: CPTII,S$GLB,, | Performed by: PSYCHIATRY & NEUROLOGY

## 2023-04-11 NOTE — PROGRESS NOTES
Subjective:       Patient ID: Дмитрий Winn is a 37 y.o. male.    Chief Complaint: brain tumor    Oncologic History  8/2018: had a first time seizure at his parent's house and was found to have a right frontal mass. Subtotal resection performed at [facility] revealed astrocytoma, IDH-mutant, WHO grade 2.  1/29/2022: surveillance imaging revealed growth of lesion  2/17/2022: repeat subtotal resection by Chago Van; pathology consistent with recurrent astrocytoma, IDH-mutant, WHO grade 2  5/9-6/20/2022: chemoradiation to 54Gy over 30 fractions  7/2022: C1 TMZ @150mg/m2  8/2022: C2 TMZ @150mg/m2 (dose inadvertently not increased)  10/23/2022: C3 TMZ @200mg/m2 (delayed due to covid exposure)  11/2022: C4 TMZ @200mg/m2  12/2022: C5 TMZ@200mg/m2  2/2023: C6 TMZ @200mg/m2  3/2023: C7 TMZ @200mg/m2    Feeling well. No new symptoms.    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 60 tablet 11    ondansetron (ZOFRAN) 8 MG tablet Take 1 tablet (8 mg total) by mouth every 8 (eight) hours as needed for Nausea. 30 tablet 3    temozolomide (TEMODAR) 100 MG capsule Take 1 capsule (100 mg total) by mouth once daily as directed days 1-5 of every 28 day cycle. Take on an empty stomach. with 2 other temozolomide prescriptions for 370 mg total. 5 capsule 11    temozolomide (TEMODAR) 20 MG capsule Take 1 capsule (20 mg total) by mouth once daily as directed days 1-5 of every 28 day cycle. Take on an empty stomach. with 2 other temozolomide prescriptions for 370 mg total. 5 capsule 11    temozolomide (TEMODAR) 250 MG capsule Take 1 capsule (250 mg total) by mouth once daily as directed days 1-5 of every 28 day cycle. Take on an empty stomach with 2 other temozolomide  prescriptions for 370 mg total. 5 capsule 11     No current facility-administered medications for this visit.      Past Surgical History:   Procedure Laterality Date    CRANIOTOMY FOR EXCISION OF INTRACRANIAL TUMOR Right 8/10/2018    Procedure: CRANIOTOMY, FOR INTRACRANIAL NEOPLASM EXCISION Right eyebrow incision / [facility] craniotomy for tumor resection;  Surgeon: Lefty Paulson MD;  [facility];  Service: Neurosurgery;  Laterality: Right;    CRANIOTOMY USING FRAMELESS STEREOTAXY Right 2022    Procedure: CRANIOTOMY, USING FRAMELESS STEREOTAXY;  Surgeon: Chago Van MD;  [facility];  Service: Neurosurgery;  Laterality: Right;    cyst removed      SURGICAL REMOVAL OF PILONIDAL CYST        Family History   Problem Relation Age of Onset    Hypertension Mother     Melanoma Father     No Known Problems Sister     No Known Problems Brother     Cancer Maternal Grandfather     Prostate cancer Maternal Grandfather     Cancer Paternal Grandmother     Breast cancer Paternal Grandmother       Social History     Tobacco Use    Smoking status: Former     Packs/day: 1.00     Years: 8.00     Pack years: 8.00     Types: Cigarettes     Start date:      Quit date: 2018     Years since quittin.6    Smokeless tobacco: Never   Substance Use Topics    Alcohol use: Yes     Alcohol/week: 42.0 standard drinks     Types: 42 Cans of beer per week     Comment: 6 pack daily     Drug use: No      Review of Systems   Constitutional:  Negative for fatigue.   HENT:  Negative for voice change.    Respiratory:  Negative for shortness of breath.    Cardiovascular:  Negative for leg swelling.   Gastrointestinal:  Negative for nausea.   Musculoskeletal:  Negative for gait problem.   Neurological:  Negative for tremors, seizures, weakness, numbness, coordination difficulties and coordination difficulties.   Psychiatric/Behavioral:  Negative for behavioral problems.    KPS: 100      Objective:       Vitals:    04/11/23 1025   BP: 137/89   Pulse: 69         NEUROLOGICAL EXAMINATION:     MENTAL STATUS   Follows 3 step commands.   Attention: normal. Concentration: normal.   Speech: speech is normal   Level of consciousness: alert  Knowledge: good.     CRANIAL NERVES   Cranial nerves II through XII intact.     MOTOR EXAM   Muscle bulk: normal  Overall muscle tone: normal    Strength   Strength 5/5 throughout.     REFLEXES     Reflexes   Right biceps: 2+  Left biceps: 2+  Right triceps: 2+  Left triceps: 2+  Right patellar: 2+  Left patellar: 2+  Right achilles: 2+  Left achilles: 2+       (Prior)     SENSORY EXAM   Light touch normal.        (Prior)     GAIT AND COORDINATION     Gait  Gait: normal    Tremor   Resting tremor: absent  Intention tremor: absent  Action tremor: absent     Imaging:  MRI brain from 3/10/2023 was personally visualized and compared to prior. This is a stable study.     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 male with a grade 2 astrocytoma, IDH-mutant s/p subtotal resection 8/2018, re-resection 2/2022, chemoradiation completed 6/2022, and ongoing adjuvant TMZ presenting for continued treatment. Tumor diagnosis was heralded by seizures. Currently Дмитрий is asymptomatic.    Astrocytoma, IDH-mutant, WHO grade 2  Дмитрий's labs are currently adequate for continued therapy. Continue cycle 8 temozolomide dose at 200mg/m2 = 370mg nightly for 5 night. He will pre-treat with ondansetron 8mg 30-45 min prior to chemotherapy dose. He will return to clinic in 4 weeks with CBC/CMP. Next MRI brain planned for 6/2023.    Seizures  Has had 2 seizures- one in 2018 heralding tumor diagnosis and one when he was taken off levetiracetam. No seizures on current AED dose. Continue levetiracetam 750mg bid.          30 minutes of total time  spent on the encounter, which includes face to face time and non-face to face time preparing to see the patient (eg, review of tests), Obtaining and/or reviewing separately obtained history, Documenting clinical information in the electronic or other health record, Independently interpreting results (not separately reported) and communicating results to the patient/family/caregiver, or Care coordination (not separately reported).     Carleen Stoddard MD  Neuro-Oncology

## 2023-04-27 ENCOUNTER — SPECIALTY PHARMACY (OUTPATIENT)
Dept: PHARMACY | Facility: CLINIC | Age: 38
End: 2023-04-27
Payer: COMMERCIAL

## 2023-04-27 NOTE — TELEPHONE ENCOUNTER
Specialty Pharmacy - Refill Coordination    Specialty Medication Orders Linked to Encounter      Flowsheet Row Most Recent Value   Medication #1 temozolomide (TEMODAR) 20 MG capsule (Order#[id number], Rx#[id number])   Medication #2 temozolomide (TEMODAR) 100 MG capsule (Order#[id number], Rx#[id number])   Medication #3 temozolomide (TEMODAR) 250 MG capsule (Order#[id number], Rx#[id number])            Refill Questions - Documented Responses      Flowsheet Row Most Recent Value   Refill Screening Questions    Changes to allergies? No   Changes to medications? No   New conditions since last clinic visit? No   Unplanned office visit, urgent care, ED, or hospital admission in the last 4 weeks? No   How does patient/caregiver feel medication is working? Good   Financial problems or insurance changes? No   How many doses of your specialty medications were missed in the last 4 weeks? 0   Would patient like to speak to a pharmacist? No   When does the patient need to receive the medication? 05/04/23   Refill Delivery Questions    How will the patient receive the medication? MEDRx   When does the patient need to receive the medication? 05/04/23   Shipping Address Home   Address in [facility] confirmed and updated if neccessary? Yes   Expected Copay ($) 157.34   Is the patient able to afford the medication copay? Yes   Payment Method CC on file   Days supply of Refill 28   Supplies needed? No supplies needed   Refill activity completed? Yes   Refill activity plan Refill scheduled   Shipment/Pickup Date: 05/01/23            Current Outpatient Medications   Medication Sig    levETIRAcetam (KEPPRA) 750 MG Tab Take 1 tablet (750 mg total) by mouth 2 (two) times daily.    ondansetron (ZOFRAN) 8 MG tablet Take 1 tablet (8 mg total) by mouth every 8 (eight) hours as needed for Nausea.    temozolomide (TEMODAR) 100 MG capsule Take 1 capsule (100 mg total) by mouth once daily as directed days 1-5 of every 28 day cycle. Take on an  empty stomach. with 2 other temozolomide prescriptions for 370 mg total.    temozolomide (TEMODAR) 20 MG capsule Take 1 capsule (20 mg total) by mouth once daily as directed days 1-5 of every 28 day cycle. Take on an empty stomach. with 2 other temozolomide prescriptions for 370 mg total.    temozolomide (TEMODAR) 250 MG capsule Take 1 capsule (250 mg total) by mouth once daily as directed days 1-5 of every 28 day cycle. Take on an empty stomach with 2 other temozolomide prescriptions for 370 mg total.   Last reviewed on 4/11/2023 10:26 AM by Laly Montano MA    Review of patient's allergies indicates:  No Known Allergies Last reviewed on  4/11/2023 10:26 AM by Laly Montano      Tasks added this encounter   No tasks added.   Tasks due within next 3 months   4/27/2023 - Refill Coordination Outreach (1 time occurrence)     Brandi Caicedo, PharmD  [facility]  [facility] [address]  [facility] [address]  Phone: [phone]  Fax: [phone]

## 2023-05-09 ENCOUNTER — OFFICE VISIT (OUTPATIENT)
Dept: NEUROSURGERY | Facility: CLINIC | Age: 38
End: 2023-05-09
Payer: COMMERCIAL

## 2023-05-09 ENCOUNTER — LAB VISIT (OUTPATIENT)
Dept: LAB | Facility: HOSPITAL | Age: 38
End: 2023-05-09
Attending: PSYCHIATRY & NEUROLOGY
Payer: COMMERCIAL

## 2023-05-09 VITALS
SYSTOLIC BLOOD PRESSURE: 134 MMHG | BODY MASS INDEX: 25.37 KG/M2 | HEART RATE: 57 BPM | WEIGHT: 157.19 LBS | DIASTOLIC BLOOD PRESSURE: 85 MMHG

## 2023-05-09 DIAGNOSIS — Z51.11 ENCOUNTER FOR CHEMOTHERAPY MANAGEMENT: ICD-10-CM

## 2023-05-09 DIAGNOSIS — C71.9 ASTROCYTOMA BRAIN TUMOR: ICD-10-CM

## 2023-05-09 DIAGNOSIS — R56.9 SEIZURE: ICD-10-CM

## 2023-05-09 DIAGNOSIS — C71.1 MALIGNANT ASTROCYTOMA OF FRONTAL LOBE: Primary | ICD-10-CM

## 2023-05-09 LAB
ALBUMIN SERPL BCP-MCNC: 4.5 G/DL (ref 3.5–5.2)
ALP SERPL-CCNC: 82 U/L (ref 55–135)
ALT SERPL W/O P-5'-P-CCNC: 31 U/L (ref 10–44)
ANION GAP SERPL CALC-SCNC: 12 MMOL/L (ref 8–16)
AST SERPL-CCNC: 26 U/L (ref 10–40)
BASOPHILS # BLD AUTO: 0.02 K/UL (ref 0–0.2)
BASOPHILS NFR BLD: 0.4 % (ref 0–1.9)
BILIRUB SERPL-MCNC: 0.7 MG/DL (ref 0.1–1)
BUN SERPL-MCNC: 9 MG/DL (ref 6–20)
CALCIUM SERPL-MCNC: 9.8 MG/DL (ref 8.7–10.5)
CHLORIDE SERPL-SCNC: 102 MMOL/L (ref 95–110)
CO2 SERPL-SCNC: 24 MMOL/L (ref 23–29)
CREAT SERPL-MCNC: 1 MG/DL (ref 0.5–1.4)
DIFFERENTIAL METHOD: ABNORMAL
EOSINOPHIL # BLD AUTO: 0.2 K/UL (ref 0–0.5)
EOSINOPHIL NFR BLD: 3 % (ref 0–8)
ERYTHROCYTE [DISTWIDTH] IN BLOOD BY AUTOMATED COUNT: 11.8 % (ref 11.5–14.5)
EST. GFR  (NO RACE VARIABLE): >60 ML/MIN/1.73 M^2
GLUCOSE SERPL-MCNC: 95 MG/DL (ref 70–110)
HCT VFR BLD AUTO: 41.4 % (ref 40–54)
HGB BLD-MCNC: 14.8 G/DL (ref 14–18)
IMM GRANULOCYTES # BLD AUTO: 0.02 K/UL (ref 0–0.04)
IMM GRANULOCYTES NFR BLD AUTO: 0.4 % (ref 0–0.5)
LYMPHOCYTES # BLD AUTO: 0.8 K/UL (ref 1–4.8)
LYMPHOCYTES NFR BLD: 15.2 % (ref 18–48)
MCH RBC QN AUTO: 32.8 PG (ref 27–31)
MCHC RBC AUTO-ENTMCNC: 35.7 G/DL (ref 32–36)
MCV RBC AUTO: 92 FL (ref 82–98)
MONOCYTES # BLD AUTO: 0.8 K/UL (ref 0.3–1)
MONOCYTES NFR BLD: 15.2 % (ref 4–15)
NEUTROPHILS # BLD AUTO: 3.5 K/UL (ref 1.8–7.7)
NEUTROPHILS NFR BLD: 65.8 % (ref 38–73)
NRBC BLD-RTO: 0 /100 WBC
PLATELET # BLD AUTO: 170 K/UL (ref 150–450)
PMV BLD AUTO: 10.6 FL (ref 9.2–12.9)
POTASSIUM SERPL-SCNC: 4.4 MMOL/L (ref 3.5–5.1)
PROT SERPL-MCNC: 7.5 G/DL (ref 6–8.4)
RBC # BLD AUTO: 4.51 M/UL (ref 4.6–6.2)
SODIUM SERPL-SCNC: 138 MMOL/L (ref 136–145)
WBC # BLD AUTO: 5.26 K/UL (ref 3.9–12.7)

## 2023-05-09 PROCEDURE — 1159F MED LIST DOCD IN RCRD: CPT | Mod: CPTII,S$GLB,, | Performed by: PSYCHIATRY & NEUROLOGY

## 2023-05-09 PROCEDURE — 36415 COLL VENOUS BLD VENIPUNCTURE: CPT | Performed by: PSYCHIATRY & NEUROLOGY

## 2023-05-09 PROCEDURE — 99999 PR PBB SHADOW E&M-EST. PATIENT-LVL III: ICD-10-PCS | Mod: PBBFAC,,, | Performed by: PSYCHIATRY & NEUROLOGY

## 2023-05-09 PROCEDURE — 3008F BODY MASS INDEX DOCD: CPT | Mod: CPTII,S$GLB,, | Performed by: PSYCHIATRY & NEUROLOGY

## 2023-05-09 PROCEDURE — 1159F PR MEDICATION LIST DOCUMENTED IN MEDICAL RECORD: ICD-10-PCS | Mod: CPTII,S$GLB,, | Performed by: PSYCHIATRY & NEUROLOGY

## 2023-05-09 PROCEDURE — 99999 PR PBB SHADOW E&M-EST. PATIENT-LVL III: CPT | Mod: PBBFAC,,, | Performed by: PSYCHIATRY & NEUROLOGY

## 2023-05-09 PROCEDURE — 3079F DIAST BP 80-89 MM HG: CPT | Mod: CPTII,S$GLB,, | Performed by: PSYCHIATRY & NEUROLOGY

## 2023-05-09 PROCEDURE — 99214 OFFICE O/P EST MOD 30 MIN: CPT | Mod: S$GLB,,, | Performed by: PSYCHIATRY & NEUROLOGY

## 2023-05-09 PROCEDURE — 3008F PR BODY MASS INDEX (BMI) DOCUMENTED: ICD-10-PCS | Mod: CPTII,S$GLB,, | Performed by: PSYCHIATRY & NEUROLOGY

## 2023-05-09 PROCEDURE — 3075F SYST BP GE 130 - 139MM HG: CPT | Mod: CPTII,S$GLB,, | Performed by: PSYCHIATRY & NEUROLOGY

## 2023-05-09 PROCEDURE — 99214 PR OFFICE/OUTPT VISIT, EST, LEVL IV, 30-39 MIN: ICD-10-PCS | Mod: S$GLB,,, | Performed by: PSYCHIATRY & NEUROLOGY

## 2023-05-09 PROCEDURE — 85025 COMPLETE CBC W/AUTO DIFF WBC: CPT | Performed by: PSYCHIATRY & NEUROLOGY

## 2023-05-09 PROCEDURE — 80053 COMPREHEN METABOLIC PANEL: CPT | Performed by: PSYCHIATRY & NEUROLOGY

## 2023-05-09 PROCEDURE — 3075F PR MOST RECENT SYSTOLIC BLOOD PRESS GE 130-139MM HG: ICD-10-PCS | Mod: CPTII,S$GLB,, | Performed by: PSYCHIATRY & NEUROLOGY

## 2023-05-09 PROCEDURE — 3079F PR MOST RECENT DIASTOLIC BLOOD PRESSURE 80-89 MM HG: ICD-10-PCS | Mod: CPTII,S$GLB,, | Performed by: PSYCHIATRY & NEUROLOGY

## 2023-05-09 NOTE — PROGRESS NOTES
Subjective:       Patient ID: Дмитрий Winn is a 37 y.o. male.    Chief Complaint: brain tumor    Oncologic History  8/2018: had a first time seizure at his parent's house and was found to have a right frontal mass. 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  1/29/2022: surveillance imaging revealed growth of lesion  2/17/2022: repeat subtotal resection by Chago Van; pathology consistent with recurrent astrocytoma, IDH-mutant, WHO grade 2  5/9-6/20/2022: chemoradiation to 54Gy over 30 fractions  7/2022: C1 TMZ @150mg/m2  8/2022: C2 TMZ @150mg/m2 (dose inadvertently not increased)  10/23/2022: C3 TMZ @200mg/m2 (delayed due to covid exposure)  11/2022: C4 TMZ @200mg/m2  12/2022: C5 TMZ@200mg/m2  2/2023: C6 TMZ @200mg/m2  3/2023: C7 TMZ @200mg/m2  4/2023: C8 TMZ @200mg/m2    Feeling well. No new symptoms.    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 60 tablet 11    ondansetron (ZOFRAN) 8 MG tablet Take 1 tablet (8 mg total) by mouth every 8 (eight) hours as needed for Nausea. 30 tablet 3    temozolomide (TEMODAR) 100 MG capsule Take 1 capsule (100 mg total) by mouth once daily as directed days 1-5 of every 28 day cycle. Take on an empty stomach. with 2 other temozolomide prescriptions for 370 mg total. 5 capsule 11    temozolomide (TEMODAR) 20 MG capsule Take 1 capsule (20 mg total) by mouth once daily as directed days 1-5 of every 28 day cycle. Take on an empty stomach. with 2 other temozolomide prescriptions for 370 mg total. 5 capsule 11    temozolomide (TEMODAR) 250 MG capsule Take 1 capsule (250 mg total) by mouth once daily as directed days 1-5 of every 28 day cycle. Take on an empty stomach with  2 other temozolomide prescriptions for 370 mg total. 5 capsule 11     No current facility-administered medications for this visit.      Past Surgical History:   Procedure Laterality Date    CRANIOTOMY FOR EXCISION OF INTRACRANIAL TUMOR Right 8/10/2018    Procedure: CRANIOTOMY, FOR INTRACRANIAL NEOPLASM EXCISION Right eyebrow incision / LAYNE [facility] craniotomy for tumor resection;  Surgeon: Lefty Paulson MD;  Location: [facility] OR;  Service: Neurosurgery;  Laterality: Right;    CRANIOTOMY USING FRAMELESS STEREOTAXY Right 2022    Procedure: CRANIOTOMY, USING FRAMELESS STEREOTAXY;  Surgeon: Chago Van MD;  [facility];  Service: Neurosurgery;  Laterality: Right;    cyst removed      SURGICAL REMOVAL OF PILONIDAL CYST        Family History   Problem Relation Age of Onset    Hypertension Mother     Melanoma Father     No Known Problems Sister     No Known Problems Brother     Cancer Maternal Grandfather     Prostate cancer Maternal Grandfather     Cancer Paternal Grandmother     Breast cancer Paternal Grandmother       Social History     Tobacco Use    Smoking status: Former     Packs/day: 1.00     Years: 8.00     Pack years: 8.00     Types: Cigarettes     Start date:      Quit date: 2018     Years since quittin.7    Smokeless tobacco: Never   Substance Use Topics    Alcohol use: Yes     Alcohol/week: 42.0 standard drinks     Types: 42 Cans of beer per week     Comment: 6 pack daily     Drug use: No      Review of Systems   Constitutional:  Negative for fatigue.   HENT:  Negative for voice change.    Respiratory:  Negative for shortness of breath.    Cardiovascular:  Negative for leg swelling.   Gastrointestinal:  Negative for nausea.   Musculoskeletal:  Negative for gait problem.   Neurological:  Negative for tremors, seizures, weakness, numbness, coordination difficulties and coordination difficulties.   Psychiatric/Behavioral:  Negative for behavioral problems.    KPS: 100       Objective:      Vitals:    05/09/23 1013   BP: 134/85   Pulse: (!) 57         NEUROLOGICAL EXAMINATION:     MENTAL STATUS   Follows 3 step commands.   Attention: normal. Concentration: normal.   Speech: speech is normal   Level of consciousness: alert  Knowledge: good.     CRANIAL NERVES   Cranial nerves II through XII intact.     MOTOR EXAM   Muscle bulk: normal  Overall muscle tone: normal    Strength   Strength 5/5 throughout.     REFLEXES     Reflexes   Right biceps: 2+  Left biceps: 2+  Right triceps: 2+  Left triceps: 2+  Right patellar: 2+  Left patellar: 2+  Right achilles: 2+  Left achilles: 2+       (Prior)     SENSORY EXAM   Light touch normal.     GAIT AND COORDINATION     Gait  Gait: normal    Tremor   Resting tremor: absent  Intention tremor: absent  Action tremor: absent     Imaging:  MRI brain from 3/10/2023 was personally visualized and compared to prior. This is a stable study.     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 male with a grade 2 astrocytoma, IDH-mutant s/p subtotal resection 8/2018, re-resection 2/2022, chemoradiation completed 6/2022, and ongoing adjuvant TMZ presenting for continued treatment. Tumor diagnosis was heralded by seizures. Currently Дмитрий is asymptomatic.    Astrocytoma, IDH-mutant, WHO grade 2  Дмитрий's labs are currently adequate for continued therapy. Continue cycle 9 temozolomide dose at 200mg/m2 = 370mg nightly for 5 nights. He will pre-treat with ondansetron 8mg 30-45 min prior to chemotherapy dose. He will return to clinic in 4 weeks with CBC/CMP. Next MRI brain planned for 6/2023.    Seizures  Has had 2 seizures- one in 2018 heralding tumor diagnosis and one when he was taken off levetiracetam. No seizures on current AED dose. Continue levetiracetam 750mg bid.          30 minutes of total  time spent on the encounter, which includes face to face time and non-face to face time preparing to see the patient (eg, review of tests), Obtaining and/or reviewing separately obtained history, Documenting clinical information in the electronic or other health record, Independently interpreting results (not separately reported) and communicating results to the patient/family/caregiver, or Care coordination (not separately reported).     Carleen Stoddard MD  Neuro-Oncology

## 2023-05-15 ENCOUNTER — PATIENT MESSAGE (OUTPATIENT)
Dept: NEUROSURGERY | Facility: CLINIC | Age: 38
End: 2023-05-15
Payer: COMMERCIAL

## 2023-05-23 ENCOUNTER — PATIENT MESSAGE (OUTPATIENT)
Dept: PHARMACY | Facility: CLINIC | Age: 38
End: 2023-05-23
Payer: COMMERCIAL

## 2023-05-26 ENCOUNTER — PATIENT MESSAGE (OUTPATIENT)
Dept: PHARMACY | Facility: CLINIC | Age: 38
End: 2023-05-26
Payer: COMMERCIAL

## 2023-05-29 ENCOUNTER — PATIENT MESSAGE (OUTPATIENT)
Dept: PHARMACY | Facility: CLINIC | Age: 38
End: 2023-05-29
Payer: COMMERCIAL

## 2023-06-02 ENCOUNTER — SPECIALTY PHARMACY (OUTPATIENT)
Dept: PHARMACY | Facility: CLINIC | Age: 38
End: 2023-06-02
Payer: COMMERCIAL

## 2023-06-02 NOTE — TELEPHONE ENCOUNTER
Specialty Pharmacy - Refill Coordination    Specialty Medication Orders Linked to Encounter      Flowsheet Row Most Recent Value   Medication #1 temozolomide (TEMODAR) 20 MG capsule (Order#[id number], Rx#[id number])   Medication #2 temozolomide (TEMODAR) 100 MG capsule (Order#[id number], Rx#[id number])   Medication #3 temozolomide (TEMODAR) 250 MG capsule (Order#[id number], Rx#[id number])            Refill Questions - Documented Responses      Flowsheet Row Most Recent Value   Patient Availability and HIPAA Verification    Does patient want to proceed with activity? Yes   HIPAA/medical authority confirmed? Yes   Relationship to patient of person spoken to? Self   Refill Screening Questions    Changes to allergies? No   Changes to medications? No   New conditions since last clinic visit? No   Unplanned office visit, urgent care, ED, or hospital admission in the last 4 weeks? No   How does patient/caregiver feel medication is working? Good   Financial problems or insurance changes? No   How many doses of your specialty medications were missed in the last 4 weeks? 0   Would patient like to speak to a pharmacist? No   When does the patient need to receive the medication? 06/10/23   Refill Delivery Questions    How will the patient receive the medication? MEDRx   When does the patient need to receive the medication? 06/10/23   Shipping Address Home   Address in [facility] confirmed and updated if neccessary? Yes   Expected Copay ($) 157.34   Is the patient able to afford the medication copay? Yes   Payment Method new CC added to file   Days supply of Refill 28   Supplies needed? No supplies needed   Refill activity completed? Yes   Refill activity plan Refill scheduled   Shipment/Pickup Date: 06/06/23            Current Outpatient Medications   Medication Sig    levETIRAcetam (KEPPRA) 750 MG Tab Take 1 tablet (750 mg total) by mouth 2 (two) times daily.    ondansetron (ZOFRAN) 8 MG tablet Take 1 tablet (8 mg  total) by mouth every 8 (eight) hours as needed for Nausea.    temozolomide (TEMODAR) 100 MG capsule Take 1 capsule (100 mg total) by mouth once daily as directed days 1-5 of every 28 day cycle. Take on an empty stomach. with 2 other temozolomide prescriptions for 370 mg total.    temozolomide (TEMODAR) 20 MG capsule Take 1 capsule (20 mg total) by mouth once daily as directed days 1-5 of every 28 day cycle. Take on an empty stomach. with 2 other temozolomide prescriptions for 370 mg total.    temozolomide (TEMODAR) 250 MG capsule Take 1 capsule (250 mg total) by mouth once daily as directed days 1-5 of every 28 day cycle. Take on an empty stomach with 2 other temozolomide prescriptions for 370 mg total.   Last reviewed on 5/9/2023 10:14 AM by Laly Montano MA    Review of patient's allergies indicates:  No Known Allergies Last reviewed on  5/9/2023 10:14 AM by Laly Montano      Tasks added this encounter   No tasks added.   Tasks due within next 3 months   No tasks due.     Mel Phillips, PharmD  [facility]  [address] [place] [id number]  Phone: [phone]  Fax: [phone]

## 2023-06-08 DIAGNOSIS — G40.209 LOCALIZATION-RELATED (FOCAL) (PARTIAL) SYMPTOMATIC EPILEPSY AND EPILEPTIC SYNDROMES WITH COMPLEX PARTIAL SEIZURES, NOT INTRACTABLE, WITHOUT STATUS EPILEPTICUS: ICD-10-CM

## 2023-06-08 RX ORDER — LEVETIRACETAM 750 MG/1
TABLET ORAL
Qty: 60 TABLET | Refills: 0 | Status: SHIPPED | OUTPATIENT
Start: 2023-06-08 | End: 2023-06-09 | Stop reason: SDUPTHER

## 2023-06-08 NOTE — TELEPHONE ENCOUNTER
Spoke with the pt, appt scheduled 6/12/23 at 1400 with Dr. Falk for sz.  He has enough medicine to get to the appt.

## 2023-06-09 ENCOUNTER — OFFICE VISIT (OUTPATIENT)
Dept: NEUROSURGERY | Facility: CLINIC | Age: 38
End: 2023-06-09
Payer: COMMERCIAL

## 2023-06-09 ENCOUNTER — HOSPITAL ENCOUNTER (OUTPATIENT)
Dept: RADIOLOGY | Facility: HOSPITAL | Age: 38
Discharge: HOME | End: 2023-06-09
Attending: NEUROLOGICAL SURGERY
Payer: COMMERCIAL

## 2023-06-09 VITALS
BODY MASS INDEX: 25.05 KG/M2 | TEMPERATURE: 98 F | HEART RATE: 73 BPM | DIASTOLIC BLOOD PRESSURE: 96 MMHG | WEIGHT: 155.19 LBS | SYSTOLIC BLOOD PRESSURE: 150 MMHG

## 2023-06-09 DIAGNOSIS — C71.1 MALIGNANT ASTROCYTOMA OF FRONTAL LOBE: ICD-10-CM

## 2023-06-09 DIAGNOSIS — G40.209 LOCALIZATION-RELATED (FOCAL) (PARTIAL) SYMPTOMATIC EPILEPSY AND EPILEPTIC SYNDROMES WITH COMPLEX PARTIAL SEIZURES, NOT INTRACTABLE, WITHOUT STATUS EPILEPTICUS: ICD-10-CM

## 2023-06-09 DIAGNOSIS — R56.9 SEIZURE: ICD-10-CM

## 2023-06-09 DIAGNOSIS — C71.9 ASTROCYTOMA BRAIN TUMOR: Primary | ICD-10-CM

## 2023-06-09 PROCEDURE — 99214 PR OFFICE/OUTPT VISIT, EST, LEVL IV, 30-39 MIN: ICD-10-PCS | Mod: S$GLB,,, | Performed by: PSYCHIATRY & NEUROLOGY

## 2023-06-09 PROCEDURE — 3077F SYST BP >= 140 MM HG: CPT | Mod: CPTII,S$GLB,, | Performed by: PSYCHIATRY & NEUROLOGY

## 2023-06-09 PROCEDURE — 99999 PR PBB SHADOW E&M-EST. PATIENT-LVL II: CPT | Mod: PBBFAC,,, | Performed by: PSYCHIATRY & NEUROLOGY

## 2023-06-09 PROCEDURE — 99999 PR PBB SHADOW E&M-EST. PATIENT-LVL II: ICD-10-PCS | Mod: PBBFAC,,, | Performed by: PSYCHIATRY & NEUROLOGY

## 2023-06-09 PROCEDURE — 70553 MRI BRAIN STEM W/O & W/DYE: CPT | Mod: 26,,, | Performed by: RADIOLOGY

## 2023-06-09 PROCEDURE — 3008F BODY MASS INDEX DOCD: CPT | Mod: CPTII,S$GLB,, | Performed by: PSYCHIATRY & NEUROLOGY

## 2023-06-09 PROCEDURE — 99214 OFFICE O/P EST MOD 30 MIN: CPT | Mod: S$GLB,,, | Performed by: PSYCHIATRY & NEUROLOGY

## 2023-06-09 PROCEDURE — 3008F PR BODY MASS INDEX (BMI) DOCUMENTED: ICD-10-PCS | Mod: CPTII,S$GLB,, | Performed by: PSYCHIATRY & NEUROLOGY

## 2023-06-09 PROCEDURE — 25500020 PHARM REV CODE 255: Performed by: NEUROLOGICAL SURGERY

## 2023-06-09 PROCEDURE — A9585 GADOBUTROL INJECTION: HCPCS | Performed by: NEUROLOGICAL SURGERY

## 2023-06-09 PROCEDURE — 3080F PR MOST RECENT DIASTOLIC BLOOD PRESSURE >= 90 MM HG: ICD-10-PCS | Mod: CPTII,S$GLB,, | Performed by: PSYCHIATRY & NEUROLOGY

## 2023-06-09 PROCEDURE — 3080F DIAST BP >= 90 MM HG: CPT | Mod: CPTII,S$GLB,, | Performed by: PSYCHIATRY & NEUROLOGY

## 2023-06-09 PROCEDURE — 3077F PR MOST RECENT SYSTOLIC BLOOD PRESSURE >= 140 MM HG: ICD-10-PCS | Mod: CPTII,S$GLB,, | Performed by: PSYCHIATRY & NEUROLOGY

## 2023-06-09 PROCEDURE — 70553 MRI BRAIN W WO CONTRAST: ICD-10-PCS | Mod: 26,,, | Performed by: RADIOLOGY

## 2023-06-09 PROCEDURE — 70553 MRI BRAIN STEM W/O & W/DYE: CPT | Mod: TC

## 2023-06-09 RX ORDER — GADOBUTROL 604.72 MG/ML
7 INJECTION INTRAVENOUS
Status: COMPLETED | OUTPATIENT
Start: 2023-06-09 | End: 2023-06-09

## 2023-06-09 RX ORDER — LEVETIRACETAM 750 MG/1
750 TABLET ORAL 2 TIMES DAILY
Qty: 180 TABLET | Refills: 3 | Status: SHIPPED | OUTPATIENT
Start: 2023-06-09 | End: 2023-09-21 | Stop reason: SDUPTHER

## 2023-06-09 RX ADMIN — GADOBUTROL 7 ML: 604.72 INJECTION INTRAVENOUS at 11:06

## 2023-06-09 NOTE — PROGRESS NOTES
Subjective:       Patient ID: Дмитрий Winn is a 37 y.o. male.    Chief Complaint: brain tumor    Oncologic History  8/2018: had a first time seizure at his parent's house and was found to have a right frontal mass. Subtotal resection performed at [facility] revealed astrocytoma, IDH-mutant, WHO grade 2.  1/29/2022: surveillance imaging revealed growth of lesion  2/17/2022: repeat subtotal resection by Chago Van; pathology consistent with recurrent astrocytoma, IDH-mutant, WHO grade 2  5/9-6/20/2022: chemoradiation to 54Gy over 30 fractions  7/2022: C1 TMZ @150mg/m2  8/2022: C2 TMZ @150mg/m2 (dose inadvertently not increased)  10/23/2022: C3 TMZ @200mg/m2 (delayed due to covid exposure)  11/2022: C4 TMZ @200mg/m2  12/2022: C5 TMZ@200mg/m2  2/2023: C6 TMZ @200mg/m2  3/2023: C7 TMZ @200mg/m2  4/2023: C8 TMZ @200mg/m2  5/2023: C9 TMZ @200mg/m2    Feeling well. No new symptoms. Had knee inflammation from a job where he was kneeling on concrete that has resolved.    Past Medical History:   Diagnosis Date    Ataxia 5/29/2020    Cancer     brain tumor    Epilepsy     Normocytic anemia 3/8/2022      Current Outpatient Medications   Medication Sig Dispense Refill    levETIRAcetam (KEPPRA) 750 MG Tab Take 1 tablet by mouth twice daily 60 tablet 0    ondansetron (ZOFRAN) 8 MG tablet Take 1 tablet (8 mg total) by mouth every 8 (eight) hours as needed for Nausea. 30 tablet 3    temozolomide (TEMODAR) 100 MG capsule Take 1 capsule (100 mg total) by mouth once daily as directed days 1-5 of every 28 day cycle. Take on an empty stomach. with 2 other temozolomide prescriptions for 370 mg total. 5 capsule 11    temozolomide (TEMODAR) 20 MG capsule Take 1 capsule (20 mg total) by mouth once daily as directed days 1-5 of every 28 day cycle. Take on an empty stomach. with 2 other temozolomide prescriptions for 370 mg total. 5 capsule 11    temozolomide (TEMODAR) 250 MG capsule Take 1 capsule (250 mg total) by  mouth once daily as directed days 1-5 of every 28 day cycle. Take on an empty stomach with 2 other temozolomide prescriptions for 370 mg total. 5 capsule 11     No current facility-administered medications for this visit.      Past Surgical History:   Procedure Laterality Date    CRANIOTOMY FOR EXCISION OF INTRACRANIAL TUMOR Right 8/10/2018    Procedure: CRANIOTOMY, FOR INTRACRANIAL NEOPLASM EXCISION Right eyebrow incision / LAY[facility] craniotomy for tumor resection;  Surgeon: Lefty Paulson MD;  Location: [facility] OR;  Service: Neurosurgery;  Laterality: Right;    CRANIOTOMY USING FRAMELESS STEREOTAXY Right 2022    Procedure: CRANIOTOMY, USING FRAMELESS STEREOTAXY;  Surgeon: Chago Van MD;  Location: [facility] OR [address] [place] [address];  Service: Neurosurgery;  Laterality: Right;    cyst removed      SURGICAL REMOVAL OF PILONIDAL CYST        Family History   Problem Relation Age of Onset    Hypertension Mother     Melanoma Father     No Known Problems Sister     No Known Problems Brother     Cancer Maternal Grandfather     Prostate cancer Maternal Grandfather     Cancer Paternal Grandmother     Breast cancer Paternal Grandmother       Social History     Tobacco Use    Smoking status: Former     Packs/day: 1.00     Years: 8.00     Pack years: 8.00     Types: Cigarettes     Start date:      Quit date: 2018     Years since quittin.8    Smokeless tobacco: Never   Substance Use Topics    Alcohol use: Yes     Alcohol/week: 42.0 standard drinks     Types: 42 Cans of beer per week     Comment: 6 pack daily     Drug use: No      Review of Systems   Constitutional:  Negative for fatigue.   HENT:  Negative for voice change.    Respiratory:  Negative for shortness of breath.    Cardiovascular:  Negative for leg swelling.   Gastrointestinal:  Negative for nausea.   Musculoskeletal:  Negative for gait problem.   Neurological:  Negative for tremors, seizures, weakness, numbness, coordination difficulties and coordination  difficulties.   Psychiatric/Behavioral:  Negative for behavioral problems.    KPS: 100      Objective:      There were no vitals filed for this visit.        NEUROLOGICAL EXAMINATION:     MENTAL STATUS   Follows 3 step commands.   Attention: normal. Concentration: normal.   Speech: speech is normal   Level of consciousness: alert  Knowledge: good.     CRANIAL NERVES   Cranial nerves II through XII intact.     MOTOR EXAM   Muscle bulk: normal  Overall muscle tone: normal    Strength   Strength 5/5 throughout.     REFLEXES     Reflexes   Right biceps: 2+  Left biceps: 2+  Right triceps: 2+  Left triceps: 2+  Right patellar: 2+  Left patellar: 2+  Right achilles: 2+  Left achilles: 2+       (Prior)     SENSORY EXAM   Light touch normal.     GAIT AND COORDINATION     Gait  Gait: normal    Tremor   Resting tremor: absent  Intention tremor: absent  Action tremor: absent     Imaging:  MRI brain from 6/9/2023 was personally visualized and compared to prior. This is a stable study.     Assessment:       Problem List Items Addressed This Visit          Neuro    Seizure       Oncology    Astrocytoma brain tumor - Primary               Plan:       Дмитрий Winn is a 37 y.o. male with a grade 2 astrocytoma, IDH-mutant s/p subtotal resection 8/2018, re-resection 2/2022, chemoradiation completed 6/2022, and ongoing adjuvant TMZ presenting for continued treatment. Tumor diagnosis was heralded by seizures. Currently Дмитрий is asymptomatic.    Astrocytoma, IDH-mutant, WHO grade 2  Дмитрий's labs are currently adequate for continued therapy. Continue cycle 10 temozolomide dose at 200mg/m2 = 370mg nightly for 5 nights. He will pre-treat with ondansetron 8mg 30-45 min prior to chemotherapy dose. He will return to clinic in 4 weeks with CBC/CMP. Next MRI brain planned for 9/2023.    Seizures  Has had 2 seizures- one in 2018 heralding tumor diagnosis and one when he was taken off levetiracetam. No seizures on current AED  dose. Continue levetiracetam 750mg bid. Refilled today.          32 minutes of total time spent on the encounter, which includes face to face time and non-face to face time preparing to see the patient (eg, review of tests), Obtaining and/or reviewing separately obtained history, Documenting clinical information in the electronic or other health record, Independently interpreting results (not separately reported) and communicating results to the patient/family/caregiver, or Care coordination (not separately reported).     Carleen Stoddard MD  Neuro-Oncology

## 2023-06-26 ENCOUNTER — SPECIALTY PHARMACY (OUTPATIENT)
Dept: PHARMACY | Facility: CLINIC | Age: 38
End: 2023-06-26
Payer: COMMERCIAL

## 2023-06-26 NOTE — TELEPHONE ENCOUNTER
Specialty Pharmacy - Refill Coordination    Specialty Medication Orders Linked to Encounter      Flowsheet Row Most Recent Value   Medication #1 temozolomide (TEMODAR) 20 MG capsule (Order#[id number], Rx#[id number])   Medication #2 temozolomide (TEMODAR) 100 MG capsule (Order#[id number], Rx#[id number])   Medication #3 temozolomide (TEMODAR) 250 MG capsule (Order#[id number], Rx#[id number])            Refill Questions - Documented Responses      Flowsheet Row Most Recent Value   Patient Availability and HIPAA Verification    Does patient want to proceed with activity? Yes   HIPAA/medical authority confirmed? Yes   Relationship to patient of person spoken to? Self   Refill Screening Questions    Changes to allergies? No   Changes to medications? No   New conditions since last clinic visit? No   Unplanned office visit, urgent care, ED, or hospital admission in the last 4 weeks? No   How does patient/caregiver feel medication is working? Good   Financial problems or insurance changes? No   How many doses of your specialty medications were missed in the last 4 weeks? 0   Would patient like to speak to a pharmacist? No   When does the patient need to receive the medication? 07/06/23   Refill Delivery Questions    How will the patient receive the medication? MEDRx   When does the patient need to receive the medication? 07/06/23   Shipping Address Home   Address in [facility] confirmed and updated if neccessary? Yes   Expected Copay ($) 157.35   Is the patient able to afford the medication copay? Yes   Payment Method CC on file   Days supply of Refill 28   Supplies needed? No supplies needed   Refill activity completed? Yes   Refill activity plan Refill scheduled   Shipment/Pickup Date: 07/03/23            Current Outpatient Medications   Medication Sig    levETIRAcetam (KEPPRA) 750 MG Tab Take 1 tablet (750 mg total) by mouth 2 (two) times daily.    ondansetron (ZOFRAN) 8 MG tablet Take 1 tablet (8 mg total) by mouth  every 8 (eight) hours as needed for Nausea.    temozolomide (TEMODAR) 100 MG capsule Take 1 capsule (100 mg total) by mouth once daily as directed days 1-5 of every 28 day cycle. Take on an empty stomach. with 2 other temozolomide prescriptions for 370 mg total.    temozolomide (TEMODAR) 20 MG capsule Take 1 capsule (20 mg total) by mouth once daily as directed days 1-5 of every 28 day cycle. Take on an empty stomach. with 2 other temozolomide prescriptions for 370 mg total.    temozolomide (TEMODAR) 250 MG capsule Take 1 capsule (250 mg total) by mouth once daily as directed days 1-5 of every 28 day cycle. Take on an empty stomach with 2 other temozolomide prescriptions for 370 mg total.   Last reviewed on 5/9/2023 10:14 AM by Laly Montano MA    Review of patient's allergies indicates:  No Known Allergies Last reviewed on  6/9/2023 10:47 AM by Karime Frazier      Tasks added this encounter   No tasks added.   Tasks due within next 3 months   9/23/2023 - Clinical Assessment (6 month recurrence)  6/26/2023 - Refill Coordination Outreach (1 time occurrence)     [facility]  [address]  [facility] [address]  Phone: [phone]  Fax: [phone]

## 2023-07-07 ENCOUNTER — PATIENT MESSAGE (OUTPATIENT)
Dept: ADMINISTRATIVE | Facility: OTHER | Age: 38
End: 2023-07-07
Payer: COMMERCIAL

## 2023-07-07 ENCOUNTER — LAB VISIT (OUTPATIENT)
Dept: LAB | Facility: HOSPITAL | Age: 38
End: 2023-07-07
Attending: PSYCHIATRY & NEUROLOGY
Payer: COMMERCIAL

## 2023-07-07 ENCOUNTER — OFFICE VISIT (OUTPATIENT)
Dept: NEUROSURGERY | Facility: CLINIC | Age: 38
End: 2023-07-07
Payer: COMMERCIAL

## 2023-07-07 VITALS — TEMPERATURE: 98 F | OXYGEN SATURATION: 100 % | BODY MASS INDEX: 24.66 KG/M2 | HEART RATE: 79 BPM | WEIGHT: 152.75 LBS

## 2023-07-07 DIAGNOSIS — C71.9 ASTROCYTOMA BRAIN TUMOR: Primary | ICD-10-CM

## 2023-07-07 DIAGNOSIS — R03.0 ELEVATED BP WITHOUT DIAGNOSIS OF HYPERTENSION: ICD-10-CM

## 2023-07-07 DIAGNOSIS — Z51.11 ENCOUNTER FOR CHEMOTHERAPY MANAGEMENT: ICD-10-CM

## 2023-07-07 DIAGNOSIS — R56.9 SEIZURES: ICD-10-CM

## 2023-07-07 DIAGNOSIS — C71.1 MALIGNANT ASTROCYTOMA OF FRONTAL LOBE: ICD-10-CM

## 2023-07-07 LAB
ALBUMIN SERPL BCP-MCNC: 4.5 G/DL (ref 3.5–5.2)
ALP SERPL-CCNC: 88 U/L (ref 55–135)
ALT SERPL W/O P-5'-P-CCNC: 32 U/L (ref 10–44)
ANION GAP SERPL CALC-SCNC: 9 MMOL/L (ref 8–16)
AST SERPL-CCNC: 26 U/L (ref 10–40)
BASOPHILS # BLD AUTO: 0.04 K/UL (ref 0–0.2)
BASOPHILS NFR BLD: 0.8 % (ref 0–1.9)
BILIRUB SERPL-MCNC: 0.8 MG/DL (ref 0.1–1)
BUN SERPL-MCNC: 8 MG/DL (ref 6–20)
CALCIUM SERPL-MCNC: 9.5 MG/DL (ref 8.7–10.5)
CHLORIDE SERPL-SCNC: 101 MMOL/L (ref 95–110)
CO2 SERPL-SCNC: 25 MMOL/L (ref 23–29)
CREAT SERPL-MCNC: 0.9 MG/DL (ref 0.5–1.4)
DIFFERENTIAL METHOD: ABNORMAL
EOSINOPHIL # BLD AUTO: 0.2 K/UL (ref 0–0.5)
EOSINOPHIL NFR BLD: 3.9 % (ref 0–8)
ERYTHROCYTE [DISTWIDTH] IN BLOOD BY AUTOMATED COUNT: 12.2 % (ref 11.5–14.5)
EST. GFR  (NO RACE VARIABLE): >60 ML/MIN/1.73 M^2
GLUCOSE SERPL-MCNC: 102 MG/DL (ref 70–110)
HCT VFR BLD AUTO: 41.1 % (ref 40–54)
HGB BLD-MCNC: 14.2 G/DL (ref 14–18)
IMM GRANULOCYTES # BLD AUTO: 0.02 K/UL (ref 0–0.04)
IMM GRANULOCYTES NFR BLD AUTO: 0.4 % (ref 0–0.5)
LYMPHOCYTES # BLD AUTO: 0.6 K/UL (ref 1–4.8)
LYMPHOCYTES NFR BLD: 11.6 % (ref 18–48)
MCH RBC QN AUTO: 33 PG (ref 27–31)
MCHC RBC AUTO-ENTMCNC: 34.5 G/DL (ref 32–36)
MCV RBC AUTO: 96 FL (ref 82–98)
MONOCYTES # BLD AUTO: 0.7 K/UL (ref 0.3–1)
MONOCYTES NFR BLD: 14.6 % (ref 4–15)
NEUTROPHILS # BLD AUTO: 3.4 K/UL (ref 1.8–7.7)
NEUTROPHILS NFR BLD: 68.7 % (ref 38–73)
NRBC BLD-RTO: 0 /100 WBC
PLATELET # BLD AUTO: 166 K/UL (ref 150–450)
PMV BLD AUTO: 11 FL (ref 9.2–12.9)
POTASSIUM SERPL-SCNC: 4 MMOL/L (ref 3.5–5.1)
PROT SERPL-MCNC: 7.5 G/DL (ref 6–8.4)
RBC # BLD AUTO: 4.3 M/UL (ref 4.6–6.2)
SODIUM SERPL-SCNC: 135 MMOL/L (ref 136–145)
WBC # BLD AUTO: 4.92 K/UL (ref 3.9–12.7)

## 2023-07-07 PROCEDURE — 99214 OFFICE O/P EST MOD 30 MIN: CPT | Mod: S$GLB,,, | Performed by: PSYCHIATRY & NEUROLOGY

## 2023-07-07 PROCEDURE — 36415 COLL VENOUS BLD VENIPUNCTURE: CPT | Performed by: PSYCHIATRY & NEUROLOGY

## 2023-07-07 PROCEDURE — 80053 COMPREHEN METABOLIC PANEL: CPT | Performed by: PSYCHIATRY & NEUROLOGY

## 2023-07-07 PROCEDURE — 85025 COMPLETE CBC W/AUTO DIFF WBC: CPT | Performed by: PSYCHIATRY & NEUROLOGY

## 2023-07-07 PROCEDURE — 99999 PR PBB SHADOW E&M-EST. PATIENT-LVL III: CPT | Mod: PBBFAC,,, | Performed by: PSYCHIATRY & NEUROLOGY

## 2023-07-07 PROCEDURE — 3008F PR BODY MASS INDEX (BMI) DOCUMENTED: ICD-10-PCS | Mod: CPTII,S$GLB,, | Performed by: PSYCHIATRY & NEUROLOGY

## 2023-07-07 PROCEDURE — 99999 PR PBB SHADOW E&M-EST. PATIENT-LVL III: ICD-10-PCS | Mod: PBBFAC,,, | Performed by: PSYCHIATRY & NEUROLOGY

## 2023-07-07 PROCEDURE — 3008F BODY MASS INDEX DOCD: CPT | Mod: CPTII,S$GLB,, | Performed by: PSYCHIATRY & NEUROLOGY

## 2023-07-07 PROCEDURE — 99214 PR OFFICE/OUTPT VISIT, EST, LEVL IV, 30-39 MIN: ICD-10-PCS | Mod: S$GLB,,, | Performed by: PSYCHIATRY & NEUROLOGY

## 2023-07-07 NOTE — PROGRESS NOTES
Subjective:       Patient ID: Дмитрий Winn is a 37 y.o. male.    Chief Complaint: brain tumor    Oncologic History  8/2018: had a first time seizure at his parent's house and was found to have a right frontal mass. 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  1/29/2022: surveillance imaging revealed growth of lesion  2/17/2022: repeat subtotal resection by Chago Van; pathology consistent with recurrent astrocytoma, IDH-mutant, WHO grade 2  5/9-6/20/2022: chemoradiation to 54Gy over 30 fractions  7/2022: C1 TMZ @150mg/m2  8/2022: C2 TMZ @150mg/m2 (dose inadvertently not increased)  10/23/2022: C3 TMZ @200mg/m2 (delayed due to covid exposure)  11/2022: C4 TMZ @200mg/m2  12/2022: C5 TMZ@200mg/m2  2/2023: C6 TMZ @200mg/m2  3/2023: C7 TMZ @200mg/m2  4/2023: C8 TMZ @200mg/m2  5/2023: C9 TMZ @200mg/m2  6/2023: C10 TMZ @200/m2  7/2023: C11 TMZ @200/m2    Feeling well. No new symptoms. Continues to work outside. He's prone to dehydration. We discussed ways to maintain hydration including using backpack with water/liquid bottles.     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 180 tablet 3    ondansetron (ZOFRAN) 8 MG tablet Take 1 tablet (8 mg total) by mouth every 8 (eight) hours as needed for Nausea. 30 tablet 3    temozolomide (TEMODAR) 100 MG capsule Take 1 capsule (100 mg total) by mouth once daily as directed days 1-5 of every 28 day cycle. Take on an empty stomach. with 2 other temozolomide prescriptions for 370 mg total. 5 capsule 11    temozolomide (TEMODAR) 20 MG capsule Take 1 capsule (20 mg total) by mouth once daily as directed days 1-5 of every 28 day cycle. Take on an empty stomach. with 2  other temozolomide prescriptions for 370 mg total. 5 capsule 11    temozolomide (TEMODAR) 250 MG capsule Take 1 capsule (250 mg total) by mouth once daily as directed days 1-5 of every 28 day cycle. Take on an empty stomach with 2 other temozolomide prescriptions for 370 mg total. 5 capsule 11     No current facility-administered medications for this visit.      Past Surgical History:   Procedure Laterality Date    CRANIOTOMY FOR EXCISION OF INTRACRANIAL TUMOR Right 8/10/2018    Procedure: CRANIOTOMY, FOR INTRACRANIAL NEOPLASM EXCISION Right eyebrow incision / LAYNE BrainPath craniotomy for tumor resection;  Surgeon: Lefty Paulson MD;  Location: [facility] OR;  Service: Neurosurgery;  Laterality: Right;    CRANIOTOMY USING FRAMELESS STEREOTAXY Right 2022    Procedure: CRANIOTOMY, USING FRAMELESS STEREOTAXY;  Surgeon: Chago Van MD;  Location: [facility] OR [address] [place] [address];  Service: Neurosurgery;  Laterality: Right;    cyst removed      SURGICAL REMOVAL OF PILONIDAL CYST        Family History   Problem Relation Age of Onset    Hypertension Mother     Melanoma Father     No Known Problems Sister     No Known Problems Brother     Cancer Maternal Grandfather     Prostate cancer Maternal Grandfather     Cancer Paternal Grandmother     Breast cancer Paternal Grandmother       Social History     Tobacco Use    Smoking status: Former     Packs/day: 1.00     Years: 8.00     Pack years: 8.00     Types: Cigarettes     Start date:      Quit date: 2018     Years since quittin.8    Smokeless tobacco: Never   Substance Use Topics    Alcohol use: Yes     Alcohol/week: 42.0 standard drinks     Types: 42 Cans of beer per week     Comment: 6 pack daily     Drug use: No      Review of Systems   Constitutional:  Negative for fatigue.   HENT:  Negative for voice change.    Respiratory:  Negative for shortness of breath.    Cardiovascular:  Negative for leg swelling.   Gastrointestinal:  Negative for nausea.   Musculoskeletal:   Negative for gait problem.   Neurological:  Negative for tremors, seizures, weakness, numbness, coordination difficulties and coordination difficulties.   Psychiatric/Behavioral:  Negative for behavioral problems.    KPS: 100      Objective:      Vitals:    07/07/23 0856   Pulse: 79   Temp: 98.1 °F (36.7 °C)           NEUROLOGICAL EXAMINATION:     MENTAL STATUS   Oriented to person, place, and time.   Follows 3 step commands.   Attention: normal. Concentration: normal.   Speech: speech is normal   Level of consciousness: alert  Knowledge: good.     CRANIAL NERVES   Cranial nerves II through XII intact.     CN III, IV, VI   Pupils are equal, round, and reactive to light.  Extraocular motions are normal.     MOTOR EXAM   Muscle bulk: normal  Overall muscle tone: normal    Strength   Strength 5/5 throughout.     REFLEXES     Reflexes   Right biceps: 2+  Left biceps: 2+  Right triceps: 2+  Left triceps: 2+  Right patellar: 2+  Left patellar: 2+  Right achilles: 2+  Left achilles: 2+       (Prior)     SENSORY EXAM   Light touch normal.     GAIT AND COORDINATION     Gait  Gait: normal    Tremor   Resting tremor: absent  Intention tremor: absent  Action tremor: absent     Imaging:  MRI brain from 6/9/2023 was personally visualized and compared to prior. This is a stable study.     Assessment:       Problem List Items Addressed This Visit          Oncology    Astrocytoma brain tumor - Primary    Relevant Orders    Care Companion Enrollment Chemotherapy; [facility] (Completed)    Assign Chemotherapy Program Consent Questionaire (Completed)    Ambulatory referral/consult to Internal Medicine     Other Visit Diagnoses       Seizures        Elevated BP without diagnosis of hypertension                          Plan:       Дмитрий Winn is a 37 y.o. male with a grade 2 astrocytoma, IDH-mutant s/p subtotal resection 8/2018, re-resection 2/2022, chemoradiation completed 6/2022, and ongoing adjuvant TMZ presenting for  continued treatment. Tumor diagnosis was heralded by seizures. Currently Дмитрий is asymptomatic.    Astrocytoma, IDH-mutant, WHO grade 2  Дмитрий's labs are currently adequate for continued therapy. Continue cycle 10 temozolomide dose at 200mg/m2 = 370mg nightly for 5 nights. He will pre-treat with ondansetron 8mg 30-45 min prior to chemotherapy dose. He will return to clinic in 4 weeks with CBC/CMP. Next MRI brain planned for 9/2023.    Elevated blood pressure   Will prescribe chemo care companion  Advised patient to check bp at home every day and keep log  Will refer to internal Medicine to establish care for PCP    Seizures  Has had 2 seizures- one in 2018 heralding tumor diagnosis and one when he was taken off levetiracetam. No seizures on current AED dose. Continue levetiracetam 750mg bid. Refilled today.        30 minutes of total time spent on the encounter, which includes face to face time and non-face to face time preparing to see the patient (eg, review of tests), Obtaining and/or reviewing separately obtained history, Documenting clinical information in the electronic or other health record, Independently interpreting results (not separately reported) and communicating results to the patient/family/caregiver, or Care coordination (not separately reported).     Discussed with Dr. Richi Vora MD  Hematology and Medical Oncology fellow

## 2023-07-12 NOTE — PROGRESS NOTES
I have seen and discussed the patient with the fellow and agree with the plan. Дмитрий is scheduled to start C11 TMZ. He will f/u in 4 weeks with labs (CBC/CMP) in anticipation of his final cycle of TMZ.      30 minutes of total time spent on the encounter, separate from resident/fellow time, which includes face to face time and non-face to face time preparing to see the patient (eg, review of tests), Obtaining and/or reviewing separately obtained history, Documenting clinical information in the electronic or other health record, Independently interpreting results (not separately reported) and communicating results to the patient/family/caregiver, or Care coordination (not separately reported).     Carleen Stoddard MD  Neuro-Oncology

## 2023-07-24 ENCOUNTER — SPECIALTY PHARMACY (OUTPATIENT)
Dept: PHARMACY | Facility: CLINIC | Age: 38
End: 2023-07-24
Payer: COMMERCIAL

## 2023-07-24 NOTE — TELEPHONE ENCOUNTER
Outgoing call regarding refill on pts temodar meds. Pt stated he is on his off weeks and new cycle starts in 2 weeks. Informed pt [facility] will follow up 7/31.

## 2023-07-26 ENCOUNTER — OFFICE VISIT (OUTPATIENT)
Dept: URGENT CARE | Facility: CLINIC | Age: 38
End: 2023-07-26
Payer: COMMERCIAL

## 2023-07-26 VITALS
DIASTOLIC BLOOD PRESSURE: 90 MMHG | WEIGHT: 152 LBS | RESPIRATION RATE: 16 BRPM | HEART RATE: 61 BPM | TEMPERATURE: 98 F | OXYGEN SATURATION: 99 % | HEIGHT: 66 IN | SYSTOLIC BLOOD PRESSURE: 145 MMHG | BODY MASS INDEX: 24.43 KG/M2

## 2023-07-26 DIAGNOSIS — R21 RASH: Primary | ICD-10-CM

## 2023-07-26 PROCEDURE — 99213 PR OFFICE/OUTPT VISIT, EST, LEVL III, 20-29 MIN: ICD-10-PCS | Mod: S$GLB,,, | Performed by: PHYSICIAN ASSISTANT

## 2023-07-26 PROCEDURE — 99213 OFFICE O/P EST LOW 20 MIN: CPT | Mod: S$GLB,,, | Performed by: PHYSICIAN ASSISTANT

## 2023-07-26 RX ORDER — HYDROCORTISONE 25 MG/G
CREAM TOPICAL 2 TIMES DAILY
Qty: 3.5 G | Refills: 1 | Status: SHIPPED | OUTPATIENT
Start: 2023-07-26 | End: 2023-09-01

## 2023-07-26 RX ORDER — PERMETHRIN 50 MG/G
CREAM TOPICAL ONCE
Qty: 60 G | Refills: 0 | Status: SHIPPED | OUTPATIENT
Start: 2023-07-26 | End: 2023-07-26

## 2023-07-26 NOTE — PATIENT INSTRUCTIONS

## 2023-07-26 NOTE — PROGRESS NOTES
"Subjective:      Patient ID: Дмитрий Winn is a 37 y.o. male.    Vitals:  height is 5' 6" (1.676 m) and weight is 68.9 kg (152 lb). His temporal temperature is 97.8 °F (36.6 °C). His blood pressure is 145/90 (abnormal) and his pulse is 61. His respiration is 16 and oxygen saturation is 99%.     Chief Complaint: Rash    Right ankle rash that has been there for several weeks  Patient has been using rubbing alcohol and antibiotic ointment on the rash   Patient concerned could be scabies      Rash  This is a new problem. The current episode started 1 to 4 weeks ago. The problem has been gradually worsening since onset. The affected locations include the left ankle. The rash is characterized by itchiness and redness. Pertinent negatives include no anorexia, congestion, cough, diarrhea, eye pain, facial edema, fatigue, fever, joint pain, nail changes, rhinorrhea, shortness of breath, sore throat or vomiting. Past treatments include antibiotic cream. There is no history of allergies, asthma, eczema or varicella.     Constitution: Negative for fatigue and fever.   HENT:  Negative for congestion and sore throat.    Eyes:  Negative for eye pain.   Respiratory:  Negative for cough and shortness of breath.    Gastrointestinal:  Negative for vomiting and diarrhea.   Skin:  Positive for rash and erythema.    Objective:     Physical Exam   Constitutional: He does not appear ill. No distress.   HENT:   Head: Normocephalic and atraumatic.   Ears:   Right Ear: External ear normal.   Left Ear: External ear normal.   Eyes: Conjunctivae are normal. Right eye exhibits no discharge. Left eye exhibits no discharge. Extraocular movement intact   Cardiovascular: Normal rate, regular rhythm and normal heart sounds.   No murmur heard.  Pulmonary/Chest: Effort normal. No respiratory distress.   Abdominal: Normal appearance.   Musculoskeletal: Normal range of motion.         General: Normal range of motion.   Neurological: He is " alert.   Skin: Skin is warm, dry and not pale. erythema         Comments: 10 x 6 cm area of erythema right distal lower extremity.  Somewhat vesicular appearance.  No interdigital lesions. jaundice  Psychiatric: His behavior is normal. Mood, judgment and thought content normal.   Nursing note and vitals reviewed.    Assessment:     1. Rash        Plan:       Rash    Other orders  -     hydrocortisone 2.5 % cream; Apply topically 2 (two) times daily. for 10 days  Dispense: 3.5 g; Refill: 1  -     permethrin (ELIMITE) 5 % cream; Apply topically once. for 1 dose  Dispense: 60 g; Refill: 0    Appears more like contact dermatitis.  Patient does of concern for scabies.  Reviewed drug interactions and no interactions with Elimite, patient may feel better completing treatment.  Prescribed cortisone cream for use and discussed keep out of heat, more likely that this is contact dermatitis.  Patient voices understanding and agrees with plan.    Patient Instructions   You must understand that you've received an Urgent Care treatment only and that you may be released before all your medical problems are known or treated. You, the patient, will arrange for follow up care as instructed.  Follow up with your PCP or specialty clinic as directed in the next 1-2 weeks if not improved or as needed.  You can call [phone] to schedule an appointment with the appropriate provider.  If your condition worsens we recommend that you receive another evaluation at the emergency room immediately or contact your primary medical clinics after hours call service to discuss your concerns.  Please return here or go to the Emergency Department for any concerns or worsening of condition.

## 2023-07-31 NOTE — TELEPHONE ENCOUNTER
Specialty Pharmacy - Refill Coordination    Specialty Medication Orders Linked to Encounter      Flowsheet Row Most Recent Value   Medication #1 temozolomide (TEMODAR) 20 MG capsule (Order#[id number], Rx#[id number])   Medication #2 temozolomide (TEMODAR) 100 MG capsule (Order#[id number], Rx#[id number])   Medication #3 temozolomide (TEMODAR) 250 MG capsule (Order#[id number], Rx#[id number])            Refill Questions - Documented Responses      Flowsheet Row Most Recent Value   Patient Availability and HIPAA Verification    Does patient want to proceed with activity? Yes   HIPAA/medical authority confirmed? Yes   Relationship to patient of person spoken to? Self   Refill Screening Questions    Changes to allergies? No   Changes to medications? No   New conditions since last clinic visit? No   Unplanned office visit, urgent care, ED, or hospital admission in the last 4 weeks? No   How does patient/caregiver feel medication is working? Good   Financial problems or insurance changes? No   How many doses of your specialty medications were missed in the last 4 weeks? 0   Would patient like to speak to a pharmacist? No   When does the patient need to receive the medication? 08/05/23   Refill Delivery Questions    How will the patient receive the medication? [facility]   When does the patient need to receive the medication? 08/05/23   Shipping Address Home   Address in [facility] confirmed and updated if neccessary? Yes   Expected Copay ($) 157.35   Is the patient able to afford the medication copay? Yes   Payment Method CC on file   Days supply of Refill 28   Supplies needed? No supplies needed   Refill activity completed? Yes   Refill activity plan Refill scheduled   Shipment/Pickup Date: 08/01/23            Current Outpatient Medications   Medication Sig    hydrocortisone 2.5 % cream Apply topically 2 (two) times daily. for 10 days    levETIRAcetam (KEPPRA) 750 MG Tab Take 1 tablet (750 mg total) by mouth 2 (two) times  daily.    ondansetron (ZOFRAN) 8 MG tablet Take 1 tablet (8 mg total) by mouth every 8 (eight) hours as needed for Nausea.    temozolomide (TEMODAR) 100 MG capsule Take 1 capsule (100 mg total) by mouth once daily as directed days 1-5 of every 28 day cycle. Take on an empty stomach. with 2 other temozolomide prescriptions for 370 mg total.    temozolomide (TEMODAR) 20 MG capsule Take 1 capsule (20 mg total) by mouth once daily as directed days 1-5 of every 28 day cycle. Take on an empty stomach. with 2 other temozolomide prescriptions for 370 mg total.    temozolomide (TEMODAR) 250 MG capsule Take 1 capsule (250 mg total) by mouth once daily as directed days 1-5 of every 28 day cycle. Take on an empty stomach with 2 other temozolomide prescriptions for 370 mg total.   Last reviewed on 7/26/2023 10:14 AM by SADIE Mohan    Review of patient's allergies indicates:  No Known Allergies Last reviewed on  7/26/2023 10:14 AM by Roselyn Kiser      Tasks added this encounter   No tasks added.   Tasks due within next 3 months   9/23/2023 - Clinical Assessment (6 month recurrence)  7/31/2023 - Refill Coordination Outreach (1 time occurrence)     [facility]  [address] [place] [id number]  Phone: [phone]  Fax: [phone]

## 2023-08-04 ENCOUNTER — OFFICE VISIT (OUTPATIENT)
Dept: NEUROSURGERY | Facility: CLINIC | Age: 38
End: 2023-08-04
Payer: COMMERCIAL

## 2023-08-04 ENCOUNTER — LAB VISIT (OUTPATIENT)
Dept: LAB | Facility: HOSPITAL | Age: 38
End: 2023-08-04
Attending: PSYCHIATRY & NEUROLOGY
Payer: COMMERCIAL

## 2023-08-04 VITALS
DIASTOLIC BLOOD PRESSURE: 89 MMHG | HEART RATE: 64 BPM | BODY MASS INDEX: 24.41 KG/M2 | SYSTOLIC BLOOD PRESSURE: 135 MMHG | WEIGHT: 151.88 LBS | HEIGHT: 66 IN

## 2023-08-04 DIAGNOSIS — C71.9 ASTROCYTOMA BRAIN TUMOR: Primary | ICD-10-CM

## 2023-08-04 DIAGNOSIS — Z51.11 ENCOUNTER FOR CHEMOTHERAPY MANAGEMENT: ICD-10-CM

## 2023-08-04 DIAGNOSIS — I10 HYPERTENSION, UNSPECIFIED TYPE: ICD-10-CM

## 2023-08-04 DIAGNOSIS — C71.1 MALIGNANT ASTROCYTOMA OF FRONTAL LOBE: ICD-10-CM

## 2023-08-04 DIAGNOSIS — R56.9 SEIZURE: ICD-10-CM

## 2023-08-04 LAB
ALBUMIN SERPL BCP-MCNC: 4.4 G/DL (ref 3.5–5.2)
ALP SERPL-CCNC: 78 U/L (ref 55–135)
ALT SERPL W/O P-5'-P-CCNC: 22 U/L (ref 10–44)
ANION GAP SERPL CALC-SCNC: 10 MMOL/L (ref 8–16)
AST SERPL-CCNC: 20 U/L (ref 10–40)
BASOPHILS # BLD AUTO: 0.03 K/UL (ref 0–0.2)
BASOPHILS NFR BLD: 0.7 % (ref 0–1.9)
BILIRUB SERPL-MCNC: 0.7 MG/DL (ref 0.1–1)
BUN SERPL-MCNC: 6 MG/DL (ref 6–20)
CALCIUM SERPL-MCNC: 9.3 MG/DL (ref 8.7–10.5)
CHLORIDE SERPL-SCNC: 103 MMOL/L (ref 95–110)
CO2 SERPL-SCNC: 23 MMOL/L (ref 23–29)
CREAT SERPL-MCNC: 0.9 MG/DL (ref 0.5–1.4)
DIFFERENTIAL METHOD: ABNORMAL
EOSINOPHIL # BLD AUTO: 0.2 K/UL (ref 0–0.5)
EOSINOPHIL NFR BLD: 4.8 % (ref 0–8)
ERYTHROCYTE [DISTWIDTH] IN BLOOD BY AUTOMATED COUNT: 12.3 % (ref 11.5–14.5)
EST. GFR  (NO RACE VARIABLE): >60 ML/MIN/1.73 M^2
GLUCOSE SERPL-MCNC: 97 MG/DL (ref 70–110)
HCT VFR BLD AUTO: 38.7 % (ref 40–54)
HGB BLD-MCNC: 14.1 G/DL (ref 14–18)
IMM GRANULOCYTES # BLD AUTO: 0.01 K/UL (ref 0–0.04)
IMM GRANULOCYTES NFR BLD AUTO: 0.2 % (ref 0–0.5)
LYMPHOCYTES # BLD AUTO: 0.6 K/UL (ref 1–4.8)
LYMPHOCYTES NFR BLD: 14.7 % (ref 18–48)
MCH RBC QN AUTO: 33.9 PG (ref 27–31)
MCHC RBC AUTO-ENTMCNC: 36.4 G/DL (ref 32–36)
MCV RBC AUTO: 93 FL (ref 82–98)
MONOCYTES # BLD AUTO: 0.7 K/UL (ref 0.3–1)
MONOCYTES NFR BLD: 16.7 % (ref 4–15)
NEUTROPHILS # BLD AUTO: 2.6 K/UL (ref 1.8–7.7)
NEUTROPHILS NFR BLD: 62.9 % (ref 38–73)
NRBC BLD-RTO: 0 /100 WBC
PLATELET # BLD AUTO: 162 K/UL (ref 150–450)
PMV BLD AUTO: 10.8 FL (ref 9.2–12.9)
POTASSIUM SERPL-SCNC: 4.3 MMOL/L (ref 3.5–5.1)
PROT SERPL-MCNC: 7.3 G/DL (ref 6–8.4)
RBC # BLD AUTO: 4.16 M/UL (ref 4.6–6.2)
SODIUM SERPL-SCNC: 136 MMOL/L (ref 136–145)
WBC # BLD AUTO: 4.14 K/UL (ref 3.9–12.7)

## 2023-08-04 PROCEDURE — 85025 COMPLETE CBC W/AUTO DIFF WBC: CPT | Performed by: PSYCHIATRY & NEUROLOGY

## 2023-08-04 PROCEDURE — 3079F DIAST BP 80-89 MM HG: CPT | Mod: CPTII,S$GLB,, | Performed by: PSYCHIATRY & NEUROLOGY

## 2023-08-04 PROCEDURE — 99999 PR PBB SHADOW E&M-EST. PATIENT-LVL III: ICD-10-PCS | Mod: PBBFAC,,, | Performed by: PSYCHIATRY & NEUROLOGY

## 2023-08-04 PROCEDURE — 3079F PR MOST RECENT DIASTOLIC BLOOD PRESSURE 80-89 MM HG: ICD-10-PCS | Mod: CPTII,S$GLB,, | Performed by: PSYCHIATRY & NEUROLOGY

## 2023-08-04 PROCEDURE — 36415 COLL VENOUS BLD VENIPUNCTURE: CPT | Performed by: PSYCHIATRY & NEUROLOGY

## 2023-08-04 PROCEDURE — 1159F MED LIST DOCD IN RCRD: CPT | Mod: CPTII,S$GLB,, | Performed by: PSYCHIATRY & NEUROLOGY

## 2023-08-04 PROCEDURE — 3008F BODY MASS INDEX DOCD: CPT | Mod: CPTII,S$GLB,, | Performed by: PSYCHIATRY & NEUROLOGY

## 2023-08-04 PROCEDURE — 99214 OFFICE O/P EST MOD 30 MIN: CPT | Mod: S$GLB,,, | Performed by: PSYCHIATRY & NEUROLOGY

## 2023-08-04 PROCEDURE — 80053 COMPREHEN METABOLIC PANEL: CPT | Performed by: PSYCHIATRY & NEUROLOGY

## 2023-08-04 PROCEDURE — 3075F SYST BP GE 130 - 139MM HG: CPT | Mod: CPTII,S$GLB,, | Performed by: PSYCHIATRY & NEUROLOGY

## 2023-08-04 PROCEDURE — 99999 PR PBB SHADOW E&M-EST. PATIENT-LVL III: CPT | Mod: PBBFAC,,, | Performed by: PSYCHIATRY & NEUROLOGY

## 2023-08-04 PROCEDURE — 3008F PR BODY MASS INDEX (BMI) DOCUMENTED: ICD-10-PCS | Mod: CPTII,S$GLB,, | Performed by: PSYCHIATRY & NEUROLOGY

## 2023-08-04 PROCEDURE — 1159F PR MEDICATION LIST DOCUMENTED IN MEDICAL RECORD: ICD-10-PCS | Mod: CPTII,S$GLB,, | Performed by: PSYCHIATRY & NEUROLOGY

## 2023-08-04 PROCEDURE — 99214 PR OFFICE/OUTPT VISIT, EST, LEVL IV, 30-39 MIN: ICD-10-PCS | Mod: S$GLB,,, | Performed by: PSYCHIATRY & NEUROLOGY

## 2023-08-04 PROCEDURE — 3075F PR MOST RECENT SYSTOLIC BLOOD PRESS GE 130-139MM HG: ICD-10-PCS | Mod: CPTII,S$GLB,, | Performed by: PSYCHIATRY & NEUROLOGY

## 2023-08-04 NOTE — PROGRESS NOTES
Subjective:       Patient ID: Дмитрий Winn is a 38 y.o. male.    Chief Complaint: brain tumor    Oncologic History  8/2018: had a first time seizure at his parent's house and was found to have a right frontal mass. 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  1/29/2022: surveillance imaging revealed growth of lesion  2/17/2022: repeat subtotal resection by Chago Van; pathology consistent with recurrent astrocytoma, IDH-mutant, WHO grade 2  5/9-6/20/2022: chemoradiation to 54Gy over 30 fractions  7/2022: C1 TMZ @150mg/m2  8/2022: C2 TMZ @150mg/m2 (dose inadvertently not increased)  10/23/2022: C3 TMZ @200mg/m2 (delayed due to covid exposure)  11/2022: C4 TMZ @200mg/m2  12/2022: C5 TMZ@200mg/m2  2/2023: C6 TMZ @200mg/m2  3/2023: C7 TMZ @200mg/m2  4/2023: C8 TMZ @200mg/m2  5/2023: C9 TMZ @200mg/m2  6/2023: C10 TMZ @200/m2  7/2023: C11 TMZ @200/m2  8/2023: C12 TMZ @200/m2      Overall, feeling well. No new symptoms. Continues to work outside, staying hydrated. He is looking forward to his last cycle of temodar.    Past Medical History:   Diagnosis Date    Ataxia 5/29/2020    Cancer     brain tumor    Epilepsy     Normocytic anemia 3/8/2022      Current Outpatient Medications   Medication Sig Dispense Refill    hydrocortisone 2.5 % cream Apply topically 2 (two) times daily. for 10 days 3.5 g 1    levETIRAcetam (KEPPRA) 750 MG Tab Take 1 tablet (750 mg total) by mouth 2 (two) times daily. 180 tablet 3    ondansetron (ZOFRAN) 8 MG tablet Take 1 tablet (8 mg total) by mouth every 8 (eight) hours as needed for Nausea. (Patient not taking: Reported on 8/4/2023) 30 tablet 3    temozolomide (TEMODAR) 100 MG capsule Take 1 capsule (100 mg total) by mouth once daily as directed days 1-5 of every 28 day cycle. Take on an empty stomach. with 2 other temozolomide prescriptions for 370 mg total. (Patient not taking: Reported on 8/4/2023.) 5 capsule 11     temozolomide (TEMODAR) 20 MG capsule Take 1 capsule (20 mg total) by mouth once daily as directed days 1-5 of every 28 day cycle. Take on an empty stomach. with 2 other temozolomide prescriptions for 370 mg total. (Patient not taking: Reported on 2023.) 5 capsule 11    temozolomide (TEMODAR) 250 MG capsule Take 1 capsule (250 mg total) by mouth once daily as directed days 1-5 of every 28 day cycle. Take on an empty stomach with 2 other temozolomide prescriptions for 370 mg total. (Patient not taking: Reported on 2023.) 5 capsule 11     No current facility-administered medications for this visit.      Past Surgical History:   Procedure Laterality Date    CRANIOTOMY FOR EXCISION OF INTRACRANIAL TUMOR Right 8/10/2018    Procedure: CRANIOTOMY, FOR INTRACRANIAL NEOPLASM EXCISION Right eyebrow incision / LAYNE BrainPath craniotomy for tumor resection;  Surgeon: Lefty Paulson MD;  Location: [facility];  Service: Neurosurgery;  Laterality: Right;    CRANIOTOMY USING FRAMELESS STEREOTAXY Right 2022    Procedure: CRANIOTOMY, USING FRAMELESS STEREOTAXY;  Surgeon: Chago Van MD;  Location: [facility] OR [address] [place] [address];  Service: Neurosurgery;  Laterality: Right;    cyst removed      SURGICAL REMOVAL OF PILONIDAL CYST        Family History   Problem Relation Age of Onset    Hypertension Mother     Melanoma Father     No Known Problems Sister     No Known Problems Brother     Cancer Maternal Grandfather     Prostate cancer Maternal Grandfather     Cancer Paternal Grandmother     Breast cancer Paternal Grandmother       Social History     Tobacco Use    Smoking status: Former     Current packs/day: 0.00     Average packs/day: 1 pack/day for 8.6 years (8.6 ttl pk-yrs)     Types: Cigarettes     Start date:      Quit date: 2018     Years since quittin.9    Smokeless tobacco: Never   Substance Use Topics    Alcohol use: Yes     Alcohol/week: 42.0 standard drinks of alcohol     Types: 42 Cans of beer per week      Comment: 6 pack daily     Drug use: No      Review of Systems   Constitutional:  Negative for fatigue.   HENT:  Negative for voice change.    Respiratory:  Negative for shortness of breath.    Cardiovascular:  Negative for leg swelling.   Gastrointestinal:  Negative for nausea.   Musculoskeletal:  Negative for gait problem.   Neurological:  Negative for tremors, seizures, weakness, numbness, coordination difficulties and coordination difficulties.   Psychiatric/Behavioral:  Negative for behavioral problems.      KPS: 100      Objective:      Vitals:    08/04/23 0838   BP: 135/89   Pulse: 64       NEUROLOGICAL EXAMINATION:     MENTAL STATUS   Oriented to person, place, and time.   Follows 3 step commands.   Attention: normal. Concentration: normal.   Speech: speech is normal   Level of consciousness: alert  Knowledge: good.     CRANIAL NERVES   Cranial nerves II through XII intact.     CN III, IV, VI   Pupils are equal, round, and reactive to light.  Extraocular motions are normal.     MOTOR EXAM   Muscle bulk: normal  Overall muscle tone: normal    Strength   Strength 5/5 throughout.     REFLEXES     Reflexes   Right biceps: 2+  Left biceps: 2+  Right triceps: 2+  Left triceps: 2+  Right patellar: 2+  Left patellar: 2+  Right achilles: 2+  Left achilles: 2+       (Prior)     SENSORY EXAM   Light touch normal.     GAIT AND COORDINATION     Gait  Gait: normal    Tremor   Resting tremor: absent  Intention tremor: absent  Action tremor: absent       Imaging:  MRI brain from 6/9/2023 was personally visualized and compared to prior. This is a stable study.     Assessment:       Problem List Items Addressed This Visit          Oncology    Astrocytoma brain tumor - Primary    Relevant Orders    MRI Brain W WO Contrast           Plan:       Дмитрий Winn is a 38 y.o. male with a grade 2 astrocytoma, IDH-mutant s/p subtotal resection 8/2018, re-resection 2/2022, chemoradiation completed 6/2022, and ongoing  adjuvant TMZ presenting for continued treatment. Tumor diagnosis was heralded by seizures. Currently Дмитрий is asymptomatic.    Astrocytoma, IDH-mutant, WHO grade 2  Дмитрий's labs are currently adequate for continued therapy. Continue cycle 12 temozolomide dose at 200mg/m2 = 370mg nightly for 5 nights. He will pre-treat with ondansetron 8mg 30-45 min prior to chemotherapy dose. He will return to clinic in 4 weeks with CBC/CMP and MRI brain. After this, will obtain MRI brain every 3 months with visit.    Elevated blood pressure   Slightly elevated at this visit, stable from prior. Continue to monitor.    Seizures  Has had 2 seizures- one in 2018 heralding tumor diagnosis and one when he was taken off levetiracetam. No seizures on current AED dose. Continue levetiracetam 750mg bid.         30 minutes of total time spent on the encounter, which includes face to face time and non-face to face time preparing to see the patient (eg, review of tests), Obtaining and/or reviewing separately obtained history, Documenting clinical information in the electronic or other health record, Independently interpreting results (not separately reported) and communicating results to the patient/family/caregiver, or Care coordination (not separately reported).     Discussed with Dr. Richi Espitia MD  Hematology and Medical Oncology fellow

## 2023-08-28 ENCOUNTER — HOSPITAL ENCOUNTER (OUTPATIENT)
Dept: RADIOLOGY | Facility: HOSPITAL | Age: 38
Discharge: HOME | End: 2023-08-28
Attending: PSYCHIATRY & NEUROLOGY
Payer: COMMERCIAL

## 2023-08-28 DIAGNOSIS — C71.9 ASTROCYTOMA BRAIN TUMOR: ICD-10-CM

## 2023-08-28 PROCEDURE — 70553 MRI BRAIN W WO CONTRAST: ICD-10-PCS | Mod: 26,,, | Performed by: STUDENT IN AN ORGANIZED HEALTH CARE EDUCATION/TRAINING PROGRAM

## 2023-08-28 PROCEDURE — 70553 MRI BRAIN STEM W/O & W/DYE: CPT | Mod: 26,,, | Performed by: STUDENT IN AN ORGANIZED HEALTH CARE EDUCATION/TRAINING PROGRAM

## 2023-08-28 PROCEDURE — A9585 GADOBUTROL INJECTION: HCPCS | Performed by: PSYCHIATRY & NEUROLOGY

## 2023-08-28 PROCEDURE — 25500020 PHARM REV CODE 255: Performed by: PSYCHIATRY & NEUROLOGY

## 2023-08-28 PROCEDURE — 70553 MRI BRAIN STEM W/O & W/DYE: CPT | Mod: TC

## 2023-08-28 RX ORDER — GADOBUTROL 604.72 MG/ML
8 INJECTION INTRAVENOUS
Status: COMPLETED | OUTPATIENT
Start: 2023-08-28 | End: 2023-08-28

## 2023-08-28 RX ADMIN — GADOBUTROL 8 ML: 604.72 INJECTION INTRAVENOUS at 10:08

## 2023-09-01 ENCOUNTER — LAB VISIT (OUTPATIENT)
Dept: LAB | Facility: HOSPITAL | Age: 38
End: 2023-09-01
Attending: PSYCHIATRY & NEUROLOGY
Payer: COMMERCIAL

## 2023-09-01 ENCOUNTER — OFFICE VISIT (OUTPATIENT)
Dept: NEUROSURGERY | Facility: CLINIC | Age: 38
End: 2023-09-01
Payer: COMMERCIAL

## 2023-09-01 VITALS
BODY MASS INDEX: 24.41 KG/M2 | HEIGHT: 66 IN | WEIGHT: 151.88 LBS | HEART RATE: 72 BPM | DIASTOLIC BLOOD PRESSURE: 87 MMHG | SYSTOLIC BLOOD PRESSURE: 146 MMHG

## 2023-09-01 DIAGNOSIS — I10 HYPERTENSION, UNSPECIFIED TYPE: ICD-10-CM

## 2023-09-01 DIAGNOSIS — C71.9 ASTROCYTOMA BRAIN TUMOR: Primary | ICD-10-CM

## 2023-09-01 DIAGNOSIS — C71.1 MALIGNANT ASTROCYTOMA OF FRONTAL LOBE: ICD-10-CM

## 2023-09-01 DIAGNOSIS — R56.9 SEIZURE: ICD-10-CM

## 2023-09-01 DIAGNOSIS — Z51.11 ENCOUNTER FOR CHEMOTHERAPY MANAGEMENT: ICD-10-CM

## 2023-09-01 LAB
ALBUMIN SERPL BCP-MCNC: 4.3 G/DL (ref 3.5–5.2)
ALP SERPL-CCNC: 82 U/L (ref 55–135)
ALT SERPL W/O P-5'-P-CCNC: 23 U/L (ref 10–44)
ANION GAP SERPL CALC-SCNC: 12 MMOL/L (ref 8–16)
AST SERPL-CCNC: 24 U/L (ref 10–40)
BASOPHILS # BLD AUTO: 0.04 K/UL (ref 0–0.2)
BASOPHILS NFR BLD: 1.2 % (ref 0–1.9)
BILIRUB SERPL-MCNC: 0.4 MG/DL (ref 0.1–1)
BUN SERPL-MCNC: 7 MG/DL (ref 6–20)
CALCIUM SERPL-MCNC: 9.3 MG/DL (ref 8.7–10.5)
CHLORIDE SERPL-SCNC: 103 MMOL/L (ref 95–110)
CO2 SERPL-SCNC: 23 MMOL/L (ref 23–29)
CREAT SERPL-MCNC: 0.8 MG/DL (ref 0.5–1.4)
DIFFERENTIAL METHOD: ABNORMAL
EOSINOPHIL # BLD AUTO: 0.2 K/UL (ref 0–0.5)
EOSINOPHIL NFR BLD: 5.4 % (ref 0–8)
ERYTHROCYTE [DISTWIDTH] IN BLOOD BY AUTOMATED COUNT: 11.9 % (ref 11.5–14.5)
EST. GFR  (NO RACE VARIABLE): >60 ML/MIN/1.73 M^2
GLUCOSE SERPL-MCNC: 91 MG/DL (ref 70–110)
HCT VFR BLD AUTO: 39.1 % (ref 40–54)
HGB BLD-MCNC: 13.9 G/DL (ref 14–18)
IMM GRANULOCYTES # BLD AUTO: 0.01 K/UL (ref 0–0.04)
IMM GRANULOCYTES NFR BLD AUTO: 0.3 % (ref 0–0.5)
LYMPHOCYTES # BLD AUTO: 0.6 K/UL (ref 1–4.8)
LYMPHOCYTES NFR BLD: 17.6 % (ref 18–48)
MCH RBC QN AUTO: 34 PG (ref 27–31)
MCHC RBC AUTO-ENTMCNC: 35.5 G/DL (ref 32–36)
MCV RBC AUTO: 96 FL (ref 82–98)
MONOCYTES # BLD AUTO: 0.5 K/UL (ref 0.3–1)
MONOCYTES NFR BLD: 16.1 % (ref 4–15)
NEUTROPHILS # BLD AUTO: 2 K/UL (ref 1.8–7.7)
NEUTROPHILS NFR BLD: 59.4 % (ref 38–73)
NRBC BLD-RTO: 0 /100 WBC
PLATELET # BLD AUTO: 179 K/UL (ref 150–450)
PMV BLD AUTO: 11 FL (ref 9.2–12.9)
POTASSIUM SERPL-SCNC: 3.9 MMOL/L (ref 3.5–5.1)
PROT SERPL-MCNC: 7.3 G/DL (ref 6–8.4)
RBC # BLD AUTO: 4.09 M/UL (ref 4.6–6.2)
SODIUM SERPL-SCNC: 138 MMOL/L (ref 136–145)
WBC # BLD AUTO: 3.36 K/UL (ref 3.9–12.7)

## 2023-09-01 PROCEDURE — 3079F DIAST BP 80-89 MM HG: CPT | Mod: CPTII,S$GLB,, | Performed by: PSYCHIATRY & NEUROLOGY

## 2023-09-01 PROCEDURE — 99215 OFFICE O/P EST HI 40 MIN: CPT | Mod: S$GLB,,, | Performed by: PSYCHIATRY & NEUROLOGY

## 2023-09-01 PROCEDURE — 99999 PR PBB SHADOW E&M-EST. PATIENT-LVL III: ICD-10-PCS | Mod: PBBFAC,,, | Performed by: PSYCHIATRY & NEUROLOGY

## 2023-09-01 PROCEDURE — 3077F SYST BP >= 140 MM HG: CPT | Mod: CPTII,S$GLB,, | Performed by: PSYCHIATRY & NEUROLOGY

## 2023-09-01 PROCEDURE — 3077F PR MOST RECENT SYSTOLIC BLOOD PRESSURE >= 140 MM HG: ICD-10-PCS | Mod: CPTII,S$GLB,, | Performed by: PSYCHIATRY & NEUROLOGY

## 2023-09-01 PROCEDURE — 3079F PR MOST RECENT DIASTOLIC BLOOD PRESSURE 80-89 MM HG: ICD-10-PCS | Mod: CPTII,S$GLB,, | Performed by: PSYCHIATRY & NEUROLOGY

## 2023-09-01 PROCEDURE — 99215 PR OFFICE/OUTPT VISIT, EST, LEVL V, 40-54 MIN: ICD-10-PCS | Mod: S$GLB,,, | Performed by: PSYCHIATRY & NEUROLOGY

## 2023-09-01 PROCEDURE — 99999 PR PBB SHADOW E&M-EST. PATIENT-LVL III: CPT | Mod: PBBFAC,,, | Performed by: PSYCHIATRY & NEUROLOGY

## 2023-09-01 PROCEDURE — 1159F PR MEDICATION LIST DOCUMENTED IN MEDICAL RECORD: ICD-10-PCS | Mod: CPTII,S$GLB,, | Performed by: PSYCHIATRY & NEUROLOGY

## 2023-09-01 PROCEDURE — 1159F MED LIST DOCD IN RCRD: CPT | Mod: CPTII,S$GLB,, | Performed by: PSYCHIATRY & NEUROLOGY

## 2023-09-01 PROCEDURE — 85025 COMPLETE CBC W/AUTO DIFF WBC: CPT | Performed by: PSYCHIATRY & NEUROLOGY

## 2023-09-01 PROCEDURE — 3008F BODY MASS INDEX DOCD: CPT | Mod: CPTII,S$GLB,, | Performed by: PSYCHIATRY & NEUROLOGY

## 2023-09-01 PROCEDURE — 36415 COLL VENOUS BLD VENIPUNCTURE: CPT | Performed by: PSYCHIATRY & NEUROLOGY

## 2023-09-01 PROCEDURE — 80053 COMPREHEN METABOLIC PANEL: CPT | Performed by: PSYCHIATRY & NEUROLOGY

## 2023-09-01 PROCEDURE — 3008F PR BODY MASS INDEX (BMI) DOCUMENTED: ICD-10-PCS | Mod: CPTII,S$GLB,, | Performed by: PSYCHIATRY & NEUROLOGY

## 2023-09-01 NOTE — PROGRESS NOTES
Subjective:       Patient ID: Дмитрий Winn is a 38 y.o. male.    Chief Complaint: brain tumor    Oncologic History  8/2018: had a first time seizure at his parent's house and was found to have a right frontal mass. 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  1/29/2022: surveillance imaging revealed growth of lesion  2/17/2022: repeat subtotal resection by Chago Van; pathology consistent with recurrent astrocytoma, IDH-mutant, WHO grade 2  5/9-6/20/2022: chemoradiation to 54Gy over 30 fractions  7/2022: C1 TMZ @150mg/m2  8/2022: C2 TMZ @150mg/m2 (dose inadvertently not increased)  10/23/2022: C3 TMZ @200mg/m2 (delayed due to covid exposure)  11/2022: C4 TMZ @200mg/m2  12/2022: C5 TMZ@200mg/m2  2/2023: C6 TMZ @200mg/m2  3/2023: C7 TMZ @200mg/m2  4/2023: C8 TMZ @200mg/m2  5/2023: C9 TMZ @200mg/m2  6/2023: C10 TMZ @200/m2  7/2023: C11 TMZ @200/m2  8/2023: C12 TMZ @200/m2      Overall, feeling well after completion of last cycle of temodar. He states that he had an episode of nausea and vomiting with his last day of temodar but none prior or afterwards. No new symptoms. 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     Past Medical History:   Diagnosis Date    Ataxia 5/29/2020    Cancer     brain tumor    Epilepsy     Normocytic anemia 3/8/2022      Current Outpatient Medications   Medication Sig Dispense Refill    hydrocortisone 2.5 % cream Apply topically 2 (two) times daily. for 10 days 3.5 g 1    levETIRAcetam (KEPPRA) 750 MG Tab Take 1 tablet (750 mg total) by mouth 2 (two) times daily. 180 tablet 3    ondansetron (ZOFRAN) 8 MG tablet Take 1 tablet (8 mg total) by mouth every 8 (eight) hours as needed for Nausea. (Patient not taking: Reported on 8/4/2023) 30 tablet 3    temozolomide (TEMODAR) 100 MG capsule Take 1 capsule (100 mg total) by mouth once daily as directed days 1-5 of every 28 day cycle. Take on an empty  stomach. with 2 other temozolomide prescriptions for 370 mg total. (Patient not taking: Reported on 2023.) 5 capsule 11    temozolomide (TEMODAR) 20 MG capsule Take 1 capsule (20 mg total) by mouth once daily as directed days 1-5 of every 28 day cycle. Take on an empty stomach. with 2 other temozolomide prescriptions for 370 mg total. (Patient not taking: Reported on 2023.) 5 capsule 11    temozolomide (TEMODAR) 250 MG capsule Take 1 capsule (250 mg total) by mouth once daily as directed days 1-5 of every 28 day cycle. Take on an empty stomach with 2 other temozolomide prescriptions for 370 mg total. (Patient not taking: Reported on 2023.) 5 capsule 11     No current facility-administered medications for this visit.      Past Surgical History:   Procedure Laterality Date    CRANIOTOMY FOR EXCISION OF INTRACRANIAL TUMOR Right 8/10/2018    Procedure: CRANIOTOMY, FOR INTRACRANIAL NEOPLASM EXCISION Right eyebrow incision / LAYNE Brain[facility] craniotomy for tumor resection;  Surgeon: Lefty Paulson MD;  Location: [facility] OR;  Service: Neurosurgery;  Laterality: Right;    CRANIOTOMY USING FRAMELESS STEREOTAXY Right 2022    Procedure: CRANIOTOMY, USING FRAMELESS STEREOTAXY;  Surgeon: Chago Van MD;  Location: [facility] OR [address] [place] [address];  Service: Neurosurgery;  Laterality: Right;    cyst removed      SURGICAL REMOVAL OF PILONIDAL CYST        Family History   Problem Relation Age of Onset    Hypertension Mother     Melanoma Father     No Known Problems Sister     No Known Problems Brother     Cancer Maternal Grandfather     Prostate cancer Maternal Grandfather     Cancer Paternal Grandmother     Breast cancer Paternal Grandmother       Social History     Tobacco Use    Smoking status: Former     Current packs/day: 0.00     Average packs/day: 1 pack/day for 8.6 years (8.6 ttl pk-yrs)     Types: Cigarettes     Start date:      Quit date: 2018     Years since quittin.0    Smokeless tobacco: Never    Substance Use Topics    Alcohol use: Yes     Alcohol/week: 42.0 standard drinks of alcohol     Types: 42 Cans of beer per week     Comment: 6 pack daily     Drug use: No      Review of Systems   Constitutional:  Negative for fatigue.   HENT:  Negative for voice change.    Respiratory:  Negative for shortness of breath.    Cardiovascular:  Negative for leg swelling.   Gastrointestinal:  Negative for nausea.   Musculoskeletal:  Negative for gait problem.   Neurological:  Negative for tremors, seizures, weakness, numbness, coordination difficulties and coordination difficulties.   Psychiatric/Behavioral:  Negative for behavioral problems.      KPS: 100      Objective:      Vitals:    09/01/23 0834   BP: (!) 146/87   Pulse: 72         NEUROLOGICAL EXAMINATION:     MENTAL STATUS   Oriented to person, place, and time.   Follows 3 step commands.   Attention: normal. Concentration: normal.   Speech: speech is normal   Level of consciousness: alert  Knowledge: good.     CRANIAL NERVES   Cranial nerves II through XII intact.     CN III, IV, VI   Pupils are equal, round, and reactive to light.  Extraocular motions are normal.     MOTOR EXAM   Muscle bulk: normal  Overall muscle tone: normal    Strength   Strength 5/5 throughout.     REFLEXES     Reflexes   Right biceps: 2+  Left biceps: 2+  Right triceps: 2+  Left triceps: 2+  Right patellar: 2+  Left patellar: 2+  Right achilles: 2+  Left achilles: 2+       (Prior)     SENSORY EXAM   Light touch normal.     GAIT AND COORDINATION     Gait  Gait: normal    Tremor   Resting tremor: absent  Intention tremor: absent  Action tremor: absent       Imaging:  MRI brain from 8/28/2023 was personally visualized and compared to prior. This is a stable study.     Assessment:       Problem List Items Addressed This Visit          Neuro    Seizure       Oncology    Astrocytoma brain tumor - Primary    Relevant Orders    MRI Brain W WO Contrast     Other Visit Diagnoses       Hypertension,  unspecified type                    Plan:       Дмитрий Winn is a 38 y.o. male with a grade 2 astrocytoma, IDH-mutant s/p subtotal resection 8/2018, re-resection 2/2022, chemoradiation completed 6/2022, and ongoing adjuvant TMZ presenting for continued treatment. Tumor diagnosis was heralded by seizures. Currently Дмитрий is asymptomatic.    Astrocytoma, IDH-mutant, WHO grade 2  Completed cycle 12 temozolomide dose at 200mg/m2 = 370mg nightly for 5 nights. MRI brain after completion of 12 cycles shows stable disease.   Follow up MRI brain every 3 months with visit for at least the next year.    Elevated blood pressure   Slightly elevated at this visit, stable from prior. Continue to monitor.    Seizures  Has had 2 seizures- one in 2018 heralding tumor diagnosis and one when he was taken off levetiracetam. No seizures on current AED dose. Continue levetiracetam 750mg bid.         30 minutes of total time spent on the encounter, which includes face to face time and non-face to face time preparing to see the patient (eg, review of tests), Obtaining and/or reviewing separately obtained history, Documenting clinical information in the electronic or other health record, Independently interpreting results (not separately reported) and communicating results to the patient/family/caregiver, or Care coordination (not separately reported).     Discussed with Dr. Richi Espitia MD  Hematology and Medical Oncology fellow

## 2023-09-21 DIAGNOSIS — G40.209 LOCALIZATION-RELATED (FOCAL) (PARTIAL) SYMPTOMATIC EPILEPSY AND EPILEPTIC SYNDROMES WITH COMPLEX PARTIAL SEIZURES, NOT INTRACTABLE, WITHOUT STATUS EPILEPTICUS: ICD-10-CM

## 2023-09-21 RX ORDER — LEVETIRACETAM 750 MG/1
750 TABLET ORAL 2 TIMES DAILY
Qty: 180 TABLET | Refills: 3 | Status: SHIPPED | OUTPATIENT
Start: 2023-09-21 | End: 2023-12-18 | Stop reason: SDUPTHER

## 2023-12-01 ENCOUNTER — HOSPITAL ENCOUNTER (OUTPATIENT)
Dept: RADIOLOGY | Facility: HOSPITAL | Age: 38
Discharge: HOME | End: 2023-12-01
Attending: PSYCHIATRY & NEUROLOGY
Payer: COMMERCIAL

## 2023-12-01 ENCOUNTER — OFFICE VISIT (OUTPATIENT)
Dept: NEUROSURGERY | Facility: CLINIC | Age: 38
End: 2023-12-01
Payer: COMMERCIAL

## 2023-12-01 VITALS
BODY MASS INDEX: 25.41 KG/M2 | SYSTOLIC BLOOD PRESSURE: 146 MMHG | DIASTOLIC BLOOD PRESSURE: 92 MMHG | WEIGHT: 157.44 LBS | HEART RATE: 81 BPM

## 2023-12-01 DIAGNOSIS — C71.9 ASTROCYTOMA BRAIN TUMOR: Primary | ICD-10-CM

## 2023-12-01 DIAGNOSIS — R56.9 SEIZURES: ICD-10-CM

## 2023-12-01 DIAGNOSIS — I10 HYPERTENSION, UNSPECIFIED TYPE: ICD-10-CM

## 2023-12-01 DIAGNOSIS — C71.9 ASTROCYTOMA BRAIN TUMOR: ICD-10-CM

## 2023-12-01 PROCEDURE — 70553 MRI BRAIN STEM W/O & W/DYE: CPT | Mod: TC

## 2023-12-01 PROCEDURE — 99999 PR PBB SHADOW E&M-EST. PATIENT-LVL III: ICD-10-PCS | Mod: PBBFAC,,, | Performed by: PSYCHIATRY & NEUROLOGY

## 2023-12-01 PROCEDURE — 3008F PR BODY MASS INDEX (BMI) DOCUMENTED: ICD-10-PCS | Mod: CPTII,S$GLB,, | Performed by: PSYCHIATRY & NEUROLOGY

## 2023-12-01 PROCEDURE — 70553 MRI BRAIN W WO CONTRAST: ICD-10-PCS | Mod: 26,,, | Performed by: RADIOLOGY

## 2023-12-01 PROCEDURE — 70553 MRI BRAIN STEM W/O & W/DYE: CPT | Mod: 26,,, | Performed by: RADIOLOGY

## 2023-12-01 PROCEDURE — 99214 PR OFFICE/OUTPT VISIT, EST, LEVL IV, 30-39 MIN: ICD-10-PCS | Mod: S$GLB,,, | Performed by: PSYCHIATRY & NEUROLOGY

## 2023-12-01 PROCEDURE — 99214 OFFICE O/P EST MOD 30 MIN: CPT | Mod: S$GLB,,, | Performed by: PSYCHIATRY & NEUROLOGY

## 2023-12-01 PROCEDURE — 3008F BODY MASS INDEX DOCD: CPT | Mod: CPTII,S$GLB,, | Performed by: PSYCHIATRY & NEUROLOGY

## 2023-12-01 PROCEDURE — 1159F PR MEDICATION LIST DOCUMENTED IN MEDICAL RECORD: ICD-10-PCS | Mod: CPTII,S$GLB,, | Performed by: PSYCHIATRY & NEUROLOGY

## 2023-12-01 PROCEDURE — A9585 GADOBUTROL INJECTION: HCPCS | Performed by: PSYCHIATRY & NEUROLOGY

## 2023-12-01 PROCEDURE — 25500020 PHARM REV CODE 255: Performed by: PSYCHIATRY & NEUROLOGY

## 2023-12-01 PROCEDURE — 3077F PR MOST RECENT SYSTOLIC BLOOD PRESSURE >= 140 MM HG: ICD-10-PCS | Mod: CPTII,S$GLB,, | Performed by: PSYCHIATRY & NEUROLOGY

## 2023-12-01 PROCEDURE — 3080F PR MOST RECENT DIASTOLIC BLOOD PRESSURE >= 90 MM HG: ICD-10-PCS | Mod: CPTII,S$GLB,, | Performed by: PSYCHIATRY & NEUROLOGY

## 2023-12-01 PROCEDURE — 99999 PR PBB SHADOW E&M-EST. PATIENT-LVL III: CPT | Mod: PBBFAC,,, | Performed by: PSYCHIATRY & NEUROLOGY

## 2023-12-01 PROCEDURE — 1159F MED LIST DOCD IN RCRD: CPT | Mod: CPTII,S$GLB,, | Performed by: PSYCHIATRY & NEUROLOGY

## 2023-12-01 PROCEDURE — 3077F SYST BP >= 140 MM HG: CPT | Mod: CPTII,S$GLB,, | Performed by: PSYCHIATRY & NEUROLOGY

## 2023-12-01 PROCEDURE — 3080F DIAST BP >= 90 MM HG: CPT | Mod: CPTII,S$GLB,, | Performed by: PSYCHIATRY & NEUROLOGY

## 2023-12-01 RX ORDER — GADOBUTROL 604.72 MG/ML
7 INJECTION INTRAVENOUS
Status: COMPLETED | OUTPATIENT
Start: 2023-12-01 | End: 2023-12-01

## 2023-12-01 RX ADMIN — GADOBUTROL 7 ML: 604.72 INJECTION INTRAVENOUS at 08:12

## 2023-12-01 NOTE — PROGRESS NOTES
Subjective:       Patient ID: Дмитрий Winn is a 38 y.o. male.    Chief Complaint: brain tumor    Oncologic History  8/2018: had a first time seizure at his parent's house and was found to have a right frontal mass. Subtotal resection performed at [facility] revealed astrocytoma, IDH-mutant, WHO grade 2.  1/29/2022: surveillance imaging revealed growth of lesion  2/17/2022: repeat subtotal resection by Chago Van; pathology consistent with recurrent astrocytoma, IDH-mutant, WHO grade 2  5/9-6/20/2022: chemoradiation to 54Gy over 30 fractions  7/2022: C1 TMZ @150mg/m2  8/2022: C2 TMZ @150mg/m2 (dose inadvertently not increased)  10/23/2022: C3 TMZ @200mg/m2 (delayed due to covid exposure)  11/2022: C4 TMZ @200mg/m2  12/2022: C5 TMZ@200mg/m2  2/2023: C6 TMZ @200mg/m2  3/2023: C7 TMZ @200mg/m2  4/2023: C8 TMZ @200mg/m2  5/2023: C9 TMZ @200mg/m2  6/2023: C10 TMZ @200/m2  7/2023: C11 TMZ @200/m2  8/2023: C12 TMZ @200/m2      Дмитрий is doing well. He had some anxiety approaching his surveillance MRI. He has not had any seizures and is tolerating his AED without issue. No new symptoms. Continues to work.    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 180 tablet 3    hydrocortisone 2.5 % cream Apply topically 2 (two) times daily. for 10 days (Patient not taking: Reported on 12/1/2023) 3.5 g 1    ondansetron (ZOFRAN) 8 MG tablet Take 1 tablet (8 mg total) by mouth every 8 (eight) hours as needed for Nausea. (Patient not taking: Reported on 8/4/2023) 30 tablet 3    temozolomide (TEMODAR) 100 MG capsule Take 1 capsule (100 mg total) by mouth once daily as directed days 1-5 of every 28 day cycle. Take on an empty stomach. with 2 other temozolomide  prescriptions for 370 mg total. (Patient not taking: Reported on 8/4/2023.) 5 capsule 11    temozolomide (TEMODAR) 20 MG capsule Take 1 capsule (20 mg total) by mouth once daily as directed days 1-5 of every 28 day cycle. Take on an empty stomach. with 2 other temozolomide prescriptions for 370 mg total. (Patient not taking: Reported on 8/4/2023.) 5 capsule 11    temozolomide (TEMODAR) 250 MG capsule Take 1 capsule (250 mg total) by mouth once daily as directed days 1-5 of every 28 day cycle. Take on an empty stomach with 2 other temozolomide prescriptions for 370 mg total. (Patient not taking: Reported on 8/4/2023.) 5 capsule 11     No current facility-administered medications for this visit.     Facility-Administered Medications Ordered in Other Visits   Medication Dose Route Frequency Provider Last Rate Last Admin    gadobutroL (GADAVIST) injection 7 mL  7 mL Intravenous ONCE PRN Carleen Stoddard MD          Past Surgical History:   Procedure Laterality Date    CRANIOTOMY FOR EXCISION OF INTRACRANIAL TUMOR Right 8/10/2018    Procedure: CRANIOTOMY, FOR INTRACRANIAL NEOPLASM EXCISION Right eyebrow incision / LAYNE [facility] craniotomy for tumor resection;  Surgeon: Lefty Paulson MD;  Location: [facility];  Service: Neurosurgery;  Laterality: Right;    CRANIOTOMY USING FRAMELESS STEREOTAXY Right 2/17/2022    Procedure: CRANIOTOMY, USING FRAMELESS STEREOTAXY;  Surgeon: Chago Van MD;  Location: [facility] OR [address] [place] [address];  Service: Neurosurgery;  Laterality: Right;    cyst removed      SURGICAL REMOVAL OF PILONIDAL CYST        Family History   Problem Relation Age of Onset    Hypertension Mother     Melanoma Father     No Known Problems Sister     No Known Problems Brother     Cancer Maternal Grandfather     Prostate cancer Maternal Grandfather     Cancer Paternal Grandmother     Breast cancer Paternal Grandmother       Social History     Tobacco Use    Smoking status: Former     Current packs/day: 0.00     Average  packs/day: 1 pack/day for 8.6 years (8.6 ttl pk-yrs)     Types: Cigarettes     Start date:      Quit date: 2018     Years since quittin.3    Smokeless tobacco: Never   Substance Use Topics    Alcohol use: Yes     Alcohol/week: 42.0 standard drinks of alcohol     Types: 42 Cans of beer per week     Comment: 6 pack daily     Drug use: No      Review of Systems   Constitutional:  Negative for fatigue.   HENT:  Negative for voice change.    Respiratory:  Negative for shortness of breath.    Cardiovascular:  Negative for leg swelling.   Gastrointestinal:  Negative for nausea.   Musculoskeletal:  Negative for gait problem.   Neurological:  Negative for tremors, seizures, weakness, numbness and coordination difficulties.   Psychiatric/Behavioral:  Negative for behavioral problems.      KPS: 100      Objective:      Vitals:    23 0818   BP: (!) 146/92   Pulse: 81         NEUROLOGICAL EXAMINATION:     MENTAL STATUS   Oriented to person, place, and time.   Follows 3 step commands.   Attention: normal. Concentration: normal.   Speech: speech is normal   Level of consciousness: alert  Knowledge: good.     CRANIAL NERVES   Cranial nerves II through XII intact.     CN III, IV, VI   Pupils are equal, round, and reactive to light.  Extraocular motions are normal.     MOTOR EXAM   Muscle bulk: normal  Overall muscle tone: normal    Strength   Strength 5/5 throughout.     REFLEXES     Reflexes   Right biceps: 2+  Left biceps: 2+  Right triceps: 2+  Left triceps: 2+  Right patellar: 2+  Left patellar: 2+  Right achilles: 2+  Left achilles: 2+       (Prior)     SENSORY EXAM   Light touch normal.     GAIT AND COORDINATION     Gait  Gait: normal    Tremor   Resting tremor: absent  Intention tremor: absent  Action tremor: absent       Imaging:  MRI brain from 2023 was personally visualized and compared to prior. This is a stable study.     Assessment:       Problem List Items Addressed This Visit          Oncology     Astrocytoma brain tumor - Primary    Relevant Orders    MRI Brain W WO Contrast     Other Visit Diagnoses       Hypertension, unspecified type        Seizures                      Plan:       Дмитрий Winn is a 38 y.o. male with a grade 2 astrocytoma, IDH-mutant s/p subtotal resection 8/2018, re-resection 2/2022, chemoradiation completed 6/2022, and ongoing adjuvant TMZ presenting for continued treatment. Tumor diagnosis was heralded by seizures. Currently Дмитрий is asymptomatic.    Astrocytoma, IDH-mutant, WHO grade 2  Completed cycle 12 temozolomide dose at 200mg/m2 = 370mg nightly for 5 nights. MRI brain after completion of 12 cycles shows stable disease.   Follow up MRI brain every 3 months through 8/2024. If stable can space out to q4 months.    Elevated blood pressure   Slightly elevated at this visit, stable from prior. Continue to monitor.    Seizures  Has had 2 seizures- one in 2018 heralding tumor diagnosis and one when he was taken off levetiracetam. No seizures on current AED dose. Continue levetiracetam 750mg bid.         Carleen Stoddard MD  Department of Neurology  Neuro-oncology, Movement Disorders

## 2023-12-18 DIAGNOSIS — G40.209 LOCALIZATION-RELATED (FOCAL) (PARTIAL) SYMPTOMATIC EPILEPSY AND EPILEPTIC SYNDROMES WITH COMPLEX PARTIAL SEIZURES, NOT INTRACTABLE, WITHOUT STATUS EPILEPTICUS: ICD-10-CM

## 2023-12-19 RX ORDER — LEVETIRACETAM 750 MG/1
750 TABLET ORAL 2 TIMES DAILY
Qty: 180 TABLET | Refills: 3 | Status: SHIPPED | OUTPATIENT
Start: 2023-12-19 | End: 2024-03-05 | Stop reason: SDUPTHER

## 2024-03-05 DIAGNOSIS — G40.209 LOCALIZATION-RELATED (FOCAL) (PARTIAL) SYMPTOMATIC EPILEPSY AND EPILEPTIC SYNDROMES WITH COMPLEX PARTIAL SEIZURES, NOT INTRACTABLE, WITHOUT STATUS EPILEPTICUS: ICD-10-CM

## 2024-03-05 RX ORDER — LEVETIRACETAM 750 MG/1
750 TABLET ORAL 2 TIMES DAILY
Qty: 180 TABLET | Refills: 3 | Status: SHIPPED | OUTPATIENT
Start: 2024-03-05 | End: 2024-06-06 | Stop reason: SDUPTHER

## 2024-03-22 ENCOUNTER — HOSPITAL ENCOUNTER (OUTPATIENT)
Dept: RADIOLOGY | Facility: HOSPITAL | Age: 39
Discharge: HOME | End: 2024-03-22
Attending: PSYCHIATRY & NEUROLOGY
Payer: COMMERCIAL

## 2024-03-22 ENCOUNTER — OFFICE VISIT (OUTPATIENT)
Dept: NEUROSURGERY | Facility: CLINIC | Age: 39
End: 2024-03-22
Payer: COMMERCIAL

## 2024-03-22 VITALS
OXYGEN SATURATION: 98 % | WEIGHT: 155.44 LBS | HEART RATE: 87 BPM | SYSTOLIC BLOOD PRESSURE: 126 MMHG | DIASTOLIC BLOOD PRESSURE: 90 MMHG | BODY MASS INDEX: 25.09 KG/M2

## 2024-03-22 DIAGNOSIS — C71.9 ASTROCYTOMA BRAIN TUMOR: ICD-10-CM

## 2024-03-22 DIAGNOSIS — C71.9 ASTROCYTOMA BRAIN TUMOR: Primary | ICD-10-CM

## 2024-03-22 DIAGNOSIS — R56.9 SEIZURE: ICD-10-CM

## 2024-03-22 PROCEDURE — 99214 OFFICE O/P EST MOD 30 MIN: CPT | Mod: S$GLB,,, | Performed by: PSYCHIATRY & NEUROLOGY

## 2024-03-22 PROCEDURE — 3080F DIAST BP >= 90 MM HG: CPT | Mod: CPTII,S$GLB,, | Performed by: PSYCHIATRY & NEUROLOGY

## 2024-03-22 PROCEDURE — A9585 GADOBUTROL INJECTION: HCPCS | Performed by: PSYCHIATRY & NEUROLOGY

## 2024-03-22 PROCEDURE — 99999 PR PBB SHADOW E&M-EST. PATIENT-LVL III: CPT | Mod: PBBFAC,,, | Performed by: PSYCHIATRY & NEUROLOGY

## 2024-03-22 PROCEDURE — 3074F SYST BP LT 130 MM HG: CPT | Mod: CPTII,S$GLB,, | Performed by: PSYCHIATRY & NEUROLOGY

## 2024-03-22 PROCEDURE — 25500020 PHARM REV CODE 255: Performed by: PSYCHIATRY & NEUROLOGY

## 2024-03-22 PROCEDURE — 70553 MRI BRAIN STEM W/O & W/DYE: CPT | Mod: 26,,, | Performed by: RADIOLOGY

## 2024-03-22 PROCEDURE — 70553 MRI BRAIN STEM W/O & W/DYE: CPT | Mod: TC

## 2024-03-22 PROCEDURE — 3008F BODY MASS INDEX DOCD: CPT | Mod: CPTII,S$GLB,, | Performed by: PSYCHIATRY & NEUROLOGY

## 2024-03-22 RX ORDER — GADOBUTROL 604.72 MG/ML
8 INJECTION INTRAVENOUS
Status: COMPLETED | OUTPATIENT
Start: 2024-03-22 | End: 2024-03-22

## 2024-03-22 RX ADMIN — GADOBUTROL 8 ML: 604.72 INJECTION INTRAVENOUS at 06:03

## 2024-03-22 NOTE — PROGRESS NOTES
Subjective:       Patient ID: Дмитрий Winn is a 38 y.o. male.    Chief Complaint: brain tumor    Oncologic History  8/2018: had a first time seizure at his parent's house and was found to have a right frontal mass. Subtotal resection performed at [facility] revealed astrocytoma, IDH-mutant, WHO grade 2.  1/29/2022: surveillance imaging revealed growth of lesion  2/17/2022: repeat subtotal resection by Chago Van; pathology consistent with recurrent astrocytoma, IDH-mutant, WHO grade 2  5/9-6/20/2022: chemoradiation to 54Gy over 30 fractions  7/2022: C1 TMZ @150mg/m2  8/2022: C2 TMZ @150mg/m2 (dose inadvertently not increased)  10/23/2022: C3 TMZ @200mg/m2 (delayed due to covid exposure)  11/2022: C4 TMZ @200mg/m2  12/2022: C5 TMZ@200mg/m2  2/2023: C6 TMZ @200mg/m2  3/2023: C7 TMZ @200mg/m2  4/2023: C8 TMZ @200mg/m2  5/2023: C9 TMZ @200mg/m2  6/2023: C10 TMZ @200/m2  7/2023: C11 TMZ @200/m2  8/2023: C12 TMZ @200/m2      Дмитрий is doing well. He has not had any seizures and is tolerating his AED without issue. No new symptoms. Continues to work.     Past Medical History:   Diagnosis Date    Ataxia 5/29/2020    Cancer     brain tumor    Epilepsy     Normocytic anemia 3/8/2022      Current Outpatient Medications   Medication Sig Dispense Refill    hydrocortisone 2.5 % cream Apply topically 2 (two) times daily. for 10 days (Patient not taking: Reported on 12/1/2023) 3.5 g 1    levETIRAcetam (KEPPRA) 750 MG Tab Take 1 tablet (750 mg total) by mouth 2 (two) times daily. 180 tablet 3    ondansetron (ZOFRAN) 8 MG tablet Take 1 tablet (8 mg total) by mouth every 8 (eight) hours as needed for Nausea. (Patient not taking: Reported on 8/4/2023) 30 tablet 3    temozolomide (TEMODAR) 100 MG capsule Take 1 capsule (100 mg total) by mouth once daily as directed days 1-5 of every 28 day cycle. Take on an empty stomach. with 2 other temozolomide prescriptions for 370 mg total. (Patient not taking: Reported  on 2023.) 5 capsule 11    temozolomide (TEMODAR) 20 MG capsule Take 1 capsule (20 mg total) by mouth once daily as directed days 1-5 of every 28 day cycle. Take on an empty stomach. with 2 other temozolomide prescriptions for 370 mg total. (Patient not taking: Reported on 2023.) 5 capsule 11    temozolomide (TEMODAR) 250 MG capsule Take 1 capsule (250 mg total) by mouth once daily as directed days 1-5 of every 28 day cycle. Take on an empty stomach with 2 other temozolomide prescriptions for 370 mg total. (Patient not taking: Reported on 2023.) 5 capsule 11     No current facility-administered medications for this visit.      Past Surgical History:   Procedure Laterality Date    CRANIOTOMY FOR EXCISION OF INTRACRANIAL TUMOR Right 8/10/2018    Procedure: CRANIOTOMY, FOR INTRACRANIAL NEOPLASM EXCISION Right eyebrow incision / LAYNE [facility] craniotomy for tumor resection;  Surgeon: Lefty Paulson MD;  Location: [facility] OR;  Service: Neurosurgery;  Laterality: Right;    CRANIOTOMY USING FRAMELESS STEREOTAXY Right 2022    Procedure: CRANIOTOMY, USING FRAMELESS STEREOTAXY;  Surgeon: Chago Van MD;  Location: [facility] OR [address] [place] [address];  Service: Neurosurgery;  Laterality: Right;    cyst removed      SURGICAL REMOVAL OF PILONIDAL CYST        Family History   Problem Relation Age of Onset    Hypertension Mother     Melanoma Father     No Known Problems Sister     No Known Problems Brother     Cancer Maternal Grandfather     Prostate cancer Maternal Grandfather     Cancer Paternal Grandmother     Breast cancer Paternal Grandmother       Social History     Tobacco Use    Smoking status: Former     Current packs/day: 0.00     Average packs/day: 1 pack/day for 8.6 years (8.6 ttl pk-yrs)     Types: Cigarettes     Start date:      Quit date: 2018     Years since quittin.6    Smokeless tobacco: Never   Substance Use Topics    Alcohol use: Yes     Alcohol/week: 42.0 standard drinks of alcohol     Types: 42  Cans of beer per week     Comment: 6 pack daily     Drug use: No      Review of Systems   Constitutional:  Negative for fatigue.   HENT:  Negative for voice change.    Respiratory:  Negative for shortness of breath.    Cardiovascular:  Negative for leg swelling.   Gastrointestinal:  Negative for nausea.   Musculoskeletal:  Negative for gait problem.   Neurological:  Negative for tremors, seizures, weakness, numbness and coordination difficulties.   Psychiatric/Behavioral:  Negative for behavioral problems.      KPS: 100      Objective:      Vitals:    03/22/24 0817   BP: (!) 126/90   Pulse: 87         NEUROLOGICAL EXAMINATION:     MENTAL STATUS   Oriented to person, place, and time.   Follows 3 step commands.   Attention: normal. Concentration: normal.   Speech: speech is normal   Level of consciousness: alert  Knowledge: good.     CRANIAL NERVES   Cranial nerves II through XII intact.     CN III, IV, VI   Pupils are equal, round, and reactive to light.  Extraocular motions are normal.     MOTOR EXAM   Muscle bulk: normal  Overall muscle tone: normal    Strength   Strength 5/5 throughout.     REFLEXES     Reflexes   Right biceps: 2+  Left biceps: 2+  Right triceps: 2+  Left triceps: 2+  Right patellar: 2+  Left patellar: 2+  Right achilles: 2+  Left achilles: 2+       (Prior)     SENSORY EXAM   Light touch normal.     GAIT AND COORDINATION     Gait  Gait: normal    Tremor   Resting tremor: absent  Intention tremor: absent  Action tremor: absent       Imaging:  MRI brain from 3/22/2024 was personally visualized and compared to prior. This is a stable study.     Assessment:       Problem List Items Addressed This Visit          Neuro    Seizure       Oncology    Astrocytoma brain tumor - Primary           Plan:       Дмитрий Winn is a 38 y.o. male with a grade 2 astrocytoma, IDH-mutant s/p subtotal resection 8/2018, re-resection 2/2022, chemoradiation completed 6/2022, and adjuvant TMZ completed 8/2023  presenting for follow up. Tumor diagnosis was heralded by seizures. Currently Дмитрий is asymptomatic.    Astrocytoma, IDH-mutant, WHO grade 2  Completed cycle 12 temozolomide dose at 200mg/m2 = 370mg nightly for 5 nights. MRI brain after completion of 12 cycles shows stable disease.   Follow up MRI brain every 3 months through 8/2024. If stable can space out to q4 months.    Seizures  Has had 2 seizures- one in 2018 heralding tumor diagnosis and one when he was taken off levetiracetam. No seizures on current AED dose. Continue levetiracetam 750mg bid.         Carleen Stoddard MD  Department of Neurology  Neuro-oncology, Movement Disorders

## 2024-06-06 DIAGNOSIS — G40.209 LOCALIZATION-RELATED (FOCAL) (PARTIAL) SYMPTOMATIC EPILEPSY AND EPILEPTIC SYNDROMES WITH COMPLEX PARTIAL SEIZURES, NOT INTRACTABLE, WITHOUT STATUS EPILEPTICUS: ICD-10-CM

## 2024-06-06 RX ORDER — LEVETIRACETAM 750 MG/1
750 TABLET ORAL 2 TIMES DAILY
Qty: 180 TABLET | Refills: 3 | Status: SHIPPED | OUTPATIENT
Start: 2024-06-06

## 2024-06-28 ENCOUNTER — HOSPITAL ENCOUNTER (OUTPATIENT)
Dept: RADIOLOGY | Facility: HOSPITAL | Age: 39
Discharge: HOME | End: 2024-06-28
Attending: PSYCHIATRY & NEUROLOGY
Payer: COMMERCIAL

## 2024-06-28 ENCOUNTER — OFFICE VISIT (OUTPATIENT)
Dept: NEUROSURGERY | Facility: CLINIC | Age: 39
End: 2024-06-28
Payer: COMMERCIAL

## 2024-06-28 VITALS
DIASTOLIC BLOOD PRESSURE: 87 MMHG | BODY MASS INDEX: 24.7 KG/M2 | WEIGHT: 153.69 LBS | HEART RATE: 77 BPM | SYSTOLIC BLOOD PRESSURE: 132 MMHG | HEIGHT: 66 IN

## 2024-06-28 DIAGNOSIS — C71.9 ASTROCYTOMA BRAIN TUMOR: ICD-10-CM

## 2024-06-28 DIAGNOSIS — C71.9 ASTROCYTOMA BRAIN TUMOR: Primary | ICD-10-CM

## 2024-06-28 DIAGNOSIS — R56.9 SEIZURES: ICD-10-CM

## 2024-06-28 PROCEDURE — 70553 MRI BRAIN STEM W/O & W/DYE: CPT | Mod: 26,,, | Performed by: RADIOLOGY

## 2024-06-28 PROCEDURE — A9585 GADOBUTROL INJECTION: HCPCS | Performed by: PSYCHIATRY & NEUROLOGY

## 2024-06-28 PROCEDURE — 25500020 PHARM REV CODE 255: Performed by: PSYCHIATRY & NEUROLOGY

## 2024-06-28 PROCEDURE — 70553 MRI BRAIN STEM W/O & W/DYE: CPT | Mod: TC

## 2024-06-28 PROCEDURE — 99999 PR PBB SHADOW E&M-EST. PATIENT-LVL III: CPT | Mod: PBBFAC,,, | Performed by: PSYCHIATRY & NEUROLOGY

## 2024-06-28 RX ORDER — GADOBUTROL 604.72 MG/ML
7 INJECTION INTRAVENOUS
Status: COMPLETED | OUTPATIENT
Start: 2024-06-28 | End: 2024-06-28

## 2024-06-28 RX ADMIN — GADOBUTROL 7 ML: 604.72 INJECTION INTRAVENOUS at 07:06

## 2024-06-28 NOTE — PROGRESS NOTES
Subjective:       Patient ID: Дмитрий Winn is a 38 y.o. male.    Chief Complaint: brain tumor    Oncologic History  8/2018: had a first time seizure at his parent's house and was found to have a right frontal mass. 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  1/29/2022: surveillance imaging revealed growth of lesion  2/17/2022: repeat subtotal resection by Chago Van; pathology consistent with recurrent astrocytoma, IDH-mutant, WHO grade 2  5/9-6/20/2022: chemoradiation to 54Gy over 30 fractions  7/2022: C1 TMZ @150mg/m2  8/2022: C2 TMZ @150mg/m2 (dose inadvertently not increased)  10/23/2022: C3 TMZ @200mg/m2 (delayed due to covid exposure)  11/2022: C4 TMZ @200mg/m2  12/2022: C5 TMZ@200mg/m2  2/2023: C6 TMZ @200mg/m2  3/2023: C7 TMZ @200mg/m2  4/2023: C8 TMZ @200mg/m2  5/2023: C9 TMZ @200mg/m2  6/2023: C10 TMZ @200/m2  7/2023: C11 TMZ @200/m2  8/2023: C12 TMZ @200/m2    Follow-up  Pertinent negatives include no fatigue, nausea, numbness or weakness.     Дмитрий is doing well. He has not had any seizures and is tolerating his AED without issue. No new symptoms. Continues to work.     Past Medical History:   Diagnosis Date    Ataxia 5/29/2020    Cancer     brain tumor    Epilepsy     Normocytic anemia 3/8/2022      Current Outpatient Medications   Medication Sig Dispense Refill    ondansetron (ZOFRAN) 8 MG tablet Take 1 tablet (8 mg total) by mouth every 8 (eight) hours as needed for Nausea. 30 tablet 3    hydrocortisone 2.5 % cream Apply topically 2 (two) times daily. for 10 days (Patient not taking: Reported on 12/1/2023) 3.5 g 1    levETIRAcetam (KEPPRA) 750 MG Tab Take 1 tablet (750 mg total) by mouth 2 (two) times daily. (Patient not taking: Reported on 6/28/2024) 180 tablet 3    temozolomide (TEMODAR) 100 MG capsule Take 1 capsule (100 mg total) by mouth once daily as directed days 1-5 of every 28 day cycle. Take on an empty stomach. with  2 other temozolomide prescriptions for 370 mg total. (Patient not taking: Reported on 2023.) 5 capsule 11    temozolomide (TEMODAR) 20 MG capsule Take 1 capsule (20 mg total) by mouth once daily as directed days 1-5 of every 28 day cycle. Take on an empty stomach. with 2 other temozolomide prescriptions for 370 mg total. (Patient not taking: Reported on 2023.) 5 capsule 11    temozolomide (TEMODAR) 250 MG capsule Take 1 capsule (250 mg total) by mouth once daily as directed days 1-5 of every 28 day cycle. Take on an empty stomach with 2 other temozolomide prescriptions for 370 mg total. (Patient not taking: Reported on 2023.) 5 capsule 11     No current facility-administered medications for this visit.      Past Surgical History:   Procedure Laterality Date    CRANIOTOMY FOR EXCISION OF INTRACRANIAL TUMOR Right 8/10/2018    Procedure: CRANIOTOMY, FOR INTRACRANIAL NEOPLASM EXCISION Right eyebrow incision / LAYNE Brain[facility] craniotomy for tumor resection;  Surgeon: Lefty Paulson MD;  Location: [facility] OR;  Service: Neurosurgery;  Laterality: Right;    CRANIOTOMY USING FRAMELESS STEREOTAXY Right 2022    Procedure: CRANIOTOMY, USING FRAMELESS STEREOTAXY;  Surgeon: Chago Van MD;  Location: [facility] OR [address] [place] [address];  Service: Neurosurgery;  Laterality: Right;    cyst removed      SURGICAL REMOVAL OF PILONIDAL CYST        Family History   Problem Relation Name Age of Onset    Hypertension Mother      Melanoma Father      No Known Problems Sister      No Known Problems Brother      Cancer Maternal Grandfather      Prostate cancer Maternal Grandfather      Cancer Paternal Grandmother      Breast cancer Paternal Grandmother        Social History     Tobacco Use    Smoking status: Former     Current packs/day: 0.00     Average packs/day: 1 pack/day for 8.6 years (8.6 ttl pk-yrs)     Types: Cigarettes     Start date:      Quit date: 2018     Years since quittin.8    Smokeless tobacco: Never    Substance Use Topics    Alcohol use: Yes     Alcohol/week: 42.0 standard drinks of alcohol     Types: 42 Cans of beer per week     Comment: 6 pack daily     Drug use: No      Review of Systems   Constitutional:  Negative for fatigue.   HENT:  Negative for voice change.    Respiratory:  Negative for shortness of breath.    Cardiovascular:  Negative for leg swelling.   Gastrointestinal:  Negative for nausea.   Musculoskeletal:  Negative for gait problem.   Neurological:  Negative for tremors, seizures, weakness, numbness and coordination difficulties.   Psychiatric/Behavioral:  Negative for behavioral problems.      KPS: 100      Objective:      Vitals:    06/28/24 0812   BP: 132/87   Pulse: 77         NEUROLOGICAL EXAMINATION:     MENTAL STATUS   Oriented to person, place, and time.   Follows 3 step commands.   Attention: normal. Concentration: normal.   Speech: speech is normal   Level of consciousness: alert  Knowledge: good.     CRANIAL NERVES   Cranial nerves II through XII intact.     CN III, IV, VI   Pupils are equal, round, and reactive to light.  Extraocular motions are normal.     MOTOR EXAM   Muscle bulk: normal  Overall muscle tone: normal    Strength   Strength 5/5 throughout.     REFLEXES     Reflexes   Right biceps: 2+  Left biceps: 2+  Right triceps: 2+  Left triceps: 2+  Right patellar: 2+  Left patellar: 2+  Right achilles: 2+  Left achilles: 2+       (Prior)     SENSORY EXAM   Light touch normal.     GAIT AND COORDINATION     Gait  Gait: normal    Tremor   Resting tremor: absent  Intention tremor: absent  Action tremor: absent       Imaging:  MRI brain from 6/28/2024 was personally visualized and compared to prior. This is a stable study.     Assessment:       Problem List Items Addressed This Visit          Oncology    Astrocytoma brain tumor - Primary     Other Visit Diagnoses       Seizures                  Plan:       Дмитрий Winn is a 38 y.o. male with a grade 2 astrocytoma,  IDH-mutant s/p subtotal resection 8/2018, re-resection 2/2022, chemoradiation completed 6/2022, and adjuvant TMZ completed 8/2023 presenting for follow up. Tumor diagnosis was heralded by seizures. Currently Дмитрий is asymptomatic.    Astrocytoma, IDH-mutant, WHO grade 2  MRI brain from earlier today is stable. Follow up MRI brain in 3 months. If stable, can space to q4 months for the next year.    Seizures  Has had 2 seizures- one in 2018 heralding tumor diagnosis and one when he was taken off levetiracetam. No seizures on current AED dose. Continue levetiracetam 750mg bid.           Carleen Stoddard MD  Department of Neurology  Neuro-oncology, Movement Disorders

## 2024-08-27 DIAGNOSIS — G40.209 LOCALIZATION-RELATED (FOCAL) (PARTIAL) SYMPTOMATIC EPILEPSY AND EPILEPTIC SYNDROMES WITH COMPLEX PARTIAL SEIZURES, NOT INTRACTABLE, WITHOUT STATUS EPILEPTICUS: ICD-10-CM

## 2024-08-28 RX ORDER — LEVETIRACETAM 750 MG/1
750 TABLET ORAL 2 TIMES DAILY
Qty: 180 TABLET | Refills: 3 | Status: SHIPPED | OUTPATIENT
Start: 2024-08-28

## 2024-09-27 ENCOUNTER — HOSPITAL ENCOUNTER (OUTPATIENT)
Dept: RADIOLOGY | Facility: HOSPITAL | Age: 39
Discharge: HOME | End: 2024-09-27
Attending: PSYCHIATRY & NEUROLOGY
Payer: COMMERCIAL

## 2024-09-27 ENCOUNTER — OFFICE VISIT (OUTPATIENT)
Dept: NEUROSURGERY | Facility: CLINIC | Age: 39
End: 2024-09-27
Payer: COMMERCIAL

## 2024-09-27 VITALS
DIASTOLIC BLOOD PRESSURE: 93 MMHG | BODY MASS INDEX: 24.55 KG/M2 | HEART RATE: 65 BPM | HEIGHT: 66 IN | WEIGHT: 152.75 LBS | SYSTOLIC BLOOD PRESSURE: 135 MMHG

## 2024-09-27 DIAGNOSIS — C71.9 ASTROCYTOMA BRAIN TUMOR: Primary | ICD-10-CM

## 2024-09-27 DIAGNOSIS — C71.9 ASTROCYTOMA BRAIN TUMOR: ICD-10-CM

## 2024-09-27 DIAGNOSIS — R56.9 SEIZURE: ICD-10-CM

## 2024-09-27 PROCEDURE — 99999 PR PBB SHADOW E&M-EST. PATIENT-LVL III: CPT | Mod: PBBFAC,,, | Performed by: PSYCHIATRY & NEUROLOGY

## 2024-09-27 PROCEDURE — 25500020 PHARM REV CODE 255: Performed by: PSYCHIATRY & NEUROLOGY

## 2024-09-27 PROCEDURE — 70553 MRI BRAIN STEM W/O & W/DYE: CPT | Mod: 26,,, | Performed by: STUDENT IN AN ORGANIZED HEALTH CARE EDUCATION/TRAINING PROGRAM

## 2024-09-27 PROCEDURE — 70553 MRI BRAIN STEM W/O & W/DYE: CPT | Mod: TC

## 2024-09-27 PROCEDURE — A9585 GADOBUTROL INJECTION: HCPCS | Performed by: PSYCHIATRY & NEUROLOGY

## 2024-09-27 RX ORDER — GADOBUTROL 604.72 MG/ML
8 INJECTION INTRAVENOUS
Status: COMPLETED | OUTPATIENT
Start: 2024-09-27 | End: 2024-09-27

## 2024-09-27 RX ADMIN — GADOBUTROL 8 ML: 604.72 INJECTION INTRAVENOUS at 07:09

## 2024-09-27 NOTE — PROGRESS NOTES
Subjective:       Patient ID: Дмитрий Winn is a 39 y.o. male.    Chief Complaint: brain tumor    Oncologic History  8/2018: had a first time seizure at his parent's house and was found to have a right frontal mass. Subtotal resection performed at [facility] revealed astrocytoma, IDH-mutant, WHO grade 2.  1/29/2022: surveillance imaging revealed growth of lesion  2/17/2022: repeat subtotal resection by Chago Van; pathology consistent with recurrent astrocytoma, IDH-mutant, WHO grade 2  5/9-6/20/2022: chemoradiation to 54Gy over 30 fractions  7/2022: C1 TMZ @150mg/m2  8/2022: C2 TMZ @150mg/m2 (dose inadvertently not increased)  10/23/2022: C3 TMZ @200mg/m2 (delayed due to covid exposure)  11/2022: C4 TMZ @200mg/m2  12/2022: C5 TMZ@200mg/m2  2/2023: C6 TMZ @200mg/m2  3/2023: C7 TMZ @200mg/m2  4/2023: C8 TMZ @200mg/m2  5/2023: C9 TMZ @200mg/m2  6/2023: C10 TMZ @200/m2  7/2023: C11 TMZ @200/m2  8/2023: C12 TMZ @200/m2    Follow-up  Pertinent negatives include no fatigue, nausea, numbness or weakness.     Дмитрий is doing well. He has not had any seizures and is tolerating his AED without issue. No new symptoms. Continues to work.     Past Medical History:   Diagnosis Date    Ataxia 5/29/2020    Cancer     brain tumor    Epilepsy     Normocytic anemia 3/8/2022      Current Outpatient Medications   Medication Sig Dispense Refill    hydrocortisone 2.5 % cream Apply topically 2 (two) times daily. for 10 days (Patient not taking: Reported on 12/1/2023) 3.5 g 1    levETIRAcetam (KEPPRA) 750 MG Tab Take 1 tablet (750 mg total) by mouth 2 (two) times daily. 180 tablet 3    ondansetron (ZOFRAN) 8 MG tablet Take 1 tablet (8 mg total) by mouth every 8 (eight) hours as needed for Nausea. 30 tablet 3    temozolomide (TEMODAR) 100 MG capsule Take 1 capsule (100 mg total) by mouth once daily as directed days 1-5 of every 28 day cycle. Take on an empty stomach. with 2 other temozolomide prescriptions for 370  mg total. (Patient not taking: Reported on 2023.) 5 capsule 11    temozolomide (TEMODAR) 20 MG capsule Take 1 capsule (20 mg total) by mouth once daily as directed days 1-5 of every 28 day cycle. Take on an empty stomach. with 2 other temozolomide prescriptions for 370 mg total. (Patient not taking: Reported on 2023.) 5 capsule 11    temozolomide (TEMODAR) 250 MG capsule Take 1 capsule (250 mg total) by mouth once daily as directed days 1-5 of every 28 day cycle. Take on an empty stomach with 2 other temozolomide prescriptions for 370 mg total. (Patient not taking: Reported on 2023.) 5 capsule 11     No current facility-administered medications for this visit.      Past Surgical History:   Procedure Laterality Date    CRANIOTOMY FOR EXCISION OF INTRACRANIAL TUMOR Right 8/10/2018    Procedure: CRANIOTOMY, FOR INTRACRANIAL NEOPLASM EXCISION Right eyebrow incision / LAYNE [facility] craniotomy for tumor resection;  Surgeon: Lefty Paulson MD;  Location: [facility];  Service: Neurosurgery;  Laterality: Right;    CRANIOTOMY USING FRAMELESS STEREOTAXY Right 2022    Procedure: CRANIOTOMY, USING FRAMELESS STEREOTAXY;  Surgeon: Chago Van MD;  Location: [facility] OR [address] [place] [address];  Service: Neurosurgery;  Laterality: Right;    cyst removed      SURGICAL REMOVAL OF PILONIDAL CYST        Family History   Problem Relation Name Age of Onset    Hypertension Mother      Melanoma Father      No Known Problems Sister      No Known Problems Brother      Cancer Maternal Grandfather      Prostate cancer Maternal Grandfather      Cancer Paternal Grandmother      Breast cancer Paternal Grandmother        Social History     Tobacco Use    Smoking status: Former     Current packs/day: 0.00     Average packs/day: 1 pack/day for 8.6 years (8.6 ttl pk-yrs)     Types: Cigarettes     Start date:      Quit date: 2018     Years since quittin.1    Smokeless tobacco: Never   Substance Use Topics    Alcohol use: Yes      Alcohol/week: 42.0 standard drinks of alcohol     Types: 42 Cans of beer per week     Comment: 6 pack daily     Drug use: No      Review of Systems   Constitutional:  Negative for fatigue.   HENT:  Negative for voice change.    Respiratory:  Negative for shortness of breath.    Cardiovascular:  Negative for leg swelling.   Gastrointestinal:  Negative for nausea.   Musculoskeletal:  Negative for gait problem.   Neurological:  Negative for tremors, seizures, weakness, numbness and coordination difficulties.   Psychiatric/Behavioral:  Negative for behavioral problems.      KPS: 100      Objective:      Vitals:    09/27/24 0824   BP: (!) 135/93   Pulse: 65           NEUROLOGICAL EXAMINATION:     MENTAL STATUS   Oriented to person, place, and time.   Follows 3 step commands.   Attention: normal. Concentration: normal.   Speech: speech is normal   Level of consciousness: alert  Knowledge: good.     CRANIAL NERVES   Cranial nerves II through XII intact.     CN III, IV, VI   Pupils are equal, round, and reactive to light.  Extraocular motions are normal.     MOTOR EXAM   Muscle bulk: normal  Overall muscle tone: normal    Strength   Strength 5/5 throughout.     REFLEXES     Reflexes   Right biceps: 2+  Left biceps: 2+  Right triceps: 2+  Left triceps: 2+  Right patellar: 2+  Left patellar: 2+  Right achilles: 2+  Left achilles: 2+       (Prior)     SENSORY EXAM   Light touch normal.     GAIT AND COORDINATION     Gait  Gait: normal    Tremor   Resting tremor: absent  Intention tremor: absent  Action tremor: absent       Imaging:  MRI brain from 9/27/2024 was personally visualized and compared to prior. This is a stable study.     Assessment:       Problem List Items Addressed This Visit          Neuro    Seizure       Oncology    Astrocytoma brain tumor - Primary    Relevant Orders    MRI Brain W WO Contrast           Plan:       Дмитрий Winn is a 39 y.o. male with a grade 2 astrocytoma, IDH-mutant s/p subtotal  resection 8/2018, re-resection 2/2022, chemoradiation completed 6/2022, and adjuvant TMZ completed 8/2023 presenting for follow up. Tumor diagnosis was heralded by seizures. Currently Дмитрий is asymptomatic.    Astrocytoma, IDH-mutant, WHO grade 2  MRI brain from earlier today is stable. Follow up MRI brain in 4 months.     Seizures  Has had 2 seizures- one in 2018 heralding tumor diagnosis and one when he was taken off levetiracetam. No seizures on current AED dose. Continue levetiracetam 750mg bid.           Carleen Stoddard MD  Department of Neurology  Neuro-oncology, Movement Disorders

## 2024-10-15 NOTE — H&P (VIEW-ONLY)
The patient location is: Home  The chief complaint leading to consultation is: astrocytoma    Visit type: audiovisual    Face to Face time with patient: 15 minutes of total time spent on the encounter, which includes face to face time and non-face to face time preparing to see the patient (eg, review of tests), Obtaining and/or reviewing separately obtained history, Documenting clinical information in the electronic or other health record, Independently interpreting results (not separately reported) and communicating results to the patient/family/caregiver, or Care coordination (not separately reported).         Each patient to whom he or she provides medical services by telemedicine is:  (1) informed of the relationship between the physician and patient and the respective role of any other health care provider with respect to management of the patient; and (2) notified that he or she may decline to receive medical services by telemedicine and may withdraw from such care at any time.    Notes:   Subjective:   Mahi RANKIN, attest that this documentation has been prepared under the direction and in the presence of Chago Van MD.     Patient ID: Дмитрий Winn is a 36 y.o. male     Chief Complaint: No chief complaint on file.          HPI  MrEmelyn Winn is a 36 y.o. 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 At the time of our last office visit on 5/4/2021, pt had no complaints.     Today the pt reports he has been doing well in the interim and denies any symptoms at this time.     Review of Systems   Constitutional: Negative for activity change, appetite change, fatigue, fever and unexpected weight change.   HENT: Negative for facial swelling.    Eyes: Negative.    Respiratory: Negative.     Cardiovascular: Negative.    Gastrointestinal: Negative for diarrhea, nausea and vomiting.   Endocrine: Negative.    Genitourinary: Negative.    Musculoskeletal: Negative for back pain, joint swelling, myalgias and neck pain.   Neurological: Negative for dizziness, seizures, weakness, numbness and headaches.   Psychiatric/Behavioral: Negative.       Past Medical History:   Diagnosis Date    Cancer     brain tumor    Epilepsy        Objective:      Physical Exam  Constitutional:       General: He is not in acute distress.     Appearance: Normal appearance.   HENT:      Head: Normocephalic and atraumatic.   Neurological:      Mental Status: He is alert and oriented to person, place, and time.            IMAGING:  MRI Brain (Tumor with Perfusion) W WO Contrast (1/29/2022): There is a mild increase in size of the right frontal lesion when compared to the prior study with mild increased mass effect. No surrounding signal abnormality in the adjacent white matter is noted. Minimal enhancement within the posterosuperior portion of the lesion is questioned although this may reflect additional prominent internal vasculature. MR perfusion demonstrates mild increased signal on cerebral blood volume maps centrally within the lesion.      I have personally reviewed the images with the pt.      I, Dr. Chago Van, personally performed the services described in this documentation. All medical record entries made by the scribe, Mahi Mayer, were at my direction and in my presence.  I have reviewed the chart and agree that the record reflects my personal performance and is accurate and complete. Chago Van MD. 02/01/2022    Assessment:       Astrocytoma.     Plan:   I have personally reviewed the MRI brain with the pt which shows mild increase in size of the right frontal lesion when compared to the prior study with mild increased mass effect. No surrounding signal abnormality in the adjacent white matter is noted. Minimal  enhancement within the posterosuperior portion of the lesion is questioned although this may reflect additional prominent internal vasculature. MR perfusion demonstrates mild increased signal on cerebral blood volume maps centrally within the lesion.    I recommend craniotomy. I have discussed the risks/benefits, indications, and alternatives for the proposed procedure in detail. I have answered all of their questions and the pt wishes to proceed with surgery. We will schedule the pt on the next available date.       5

## 2024-11-22 DIAGNOSIS — G40.209 LOCALIZATION-RELATED (FOCAL) (PARTIAL) SYMPTOMATIC EPILEPSY AND EPILEPTIC SYNDROMES WITH COMPLEX PARTIAL SEIZURES, NOT INTRACTABLE, WITHOUT STATUS EPILEPTICUS: ICD-10-CM

## 2024-11-25 RX ORDER — LEVETIRACETAM 750 MG/1
750 TABLET ORAL 2 TIMES DAILY
Qty: 180 TABLET | Refills: 3 | Status: SHIPPED | OUTPATIENT
Start: 2024-11-25

## 2025-01-28 ENCOUNTER — HOSPITAL ENCOUNTER (OUTPATIENT)
Dept: RADIOLOGY | Facility: HOSPITAL | Age: 40
Discharge: HOME | End: 2025-01-28
Attending: PSYCHIATRY & NEUROLOGY
Payer: COMMERCIAL

## 2025-01-28 ENCOUNTER — TELEPHONE (OUTPATIENT)
Facility: CLINIC | Age: 40
End: 2025-01-28
Payer: COMMERCIAL

## 2025-01-28 DIAGNOSIS — C71.9 ASTROCYTOMA BRAIN TUMOR: ICD-10-CM

## 2025-01-28 PROCEDURE — 25500020 PHARM REV CODE 255: Performed by: PSYCHIATRY & NEUROLOGY

## 2025-01-28 PROCEDURE — 70553 MRI BRAIN STEM W/O & W/DYE: CPT | Mod: TC

## 2025-01-28 PROCEDURE — A9585 GADOBUTROL INJECTION: HCPCS | Performed by: PSYCHIATRY & NEUROLOGY

## 2025-01-28 PROCEDURE — 70553 MRI BRAIN STEM W/O & W/DYE: CPT | Mod: 26,,, | Performed by: RADIOLOGY

## 2025-01-28 RX ORDER — GADOBUTROL 604.72 MG/ML
7 INJECTION INTRAVENOUS
Status: COMPLETED | OUTPATIENT
Start: 2025-01-28 | End: 2025-01-28

## 2025-01-28 RX ADMIN — GADOBUTROL 7 ML: 604.72 INJECTION INTRAVENOUS at 07:01

## 2025-01-28 NOTE — TELEPHONE ENCOUNTER
Called patient to schedule follow up appointment, appointment will be scheduled 1/29/25 at 10:40 virtually.

## 2025-02-07 ENCOUNTER — OFFICE VISIT (OUTPATIENT)
Dept: NEUROSURGERY | Facility: CLINIC | Age: 40
End: 2025-02-07
Payer: COMMERCIAL

## 2025-02-07 VITALS
WEIGHT: 157.19 LBS | DIASTOLIC BLOOD PRESSURE: 94 MMHG | SYSTOLIC BLOOD PRESSURE: 139 MMHG | HEIGHT: 66 IN | BODY MASS INDEX: 25.26 KG/M2 | HEART RATE: 64 BPM

## 2025-02-07 DIAGNOSIS — G40.209 LOCALIZATION-RELATED (FOCAL) (PARTIAL) SYMPTOMATIC EPILEPSY AND EPILEPTIC SYNDROMES WITH COMPLEX PARTIAL SEIZURES, NOT INTRACTABLE, WITHOUT STATUS EPILEPTICUS: ICD-10-CM

## 2025-02-07 DIAGNOSIS — C71.9 ASTROCYTOMA BRAIN TUMOR: Primary | ICD-10-CM

## 2025-02-07 PROCEDURE — 99999 PR PBB SHADOW E&M-EST. PATIENT-LVL III: CPT | Mod: PBBFAC,,, | Performed by: PSYCHIATRY & NEUROLOGY

## 2025-02-07 PROCEDURE — 1159F MED LIST DOCD IN RCRD: CPT | Mod: CPTII,S$GLB,, | Performed by: PSYCHIATRY & NEUROLOGY

## 2025-02-07 PROCEDURE — G2211 COMPLEX E/M VISIT ADD ON: HCPCS | Mod: S$GLB,,, | Performed by: PSYCHIATRY & NEUROLOGY

## 2025-02-07 PROCEDURE — 3080F DIAST BP >= 90 MM HG: CPT | Mod: CPTII,S$GLB,, | Performed by: PSYCHIATRY & NEUROLOGY

## 2025-02-07 PROCEDURE — 99214 OFFICE O/P EST MOD 30 MIN: CPT | Mod: S$GLB,,, | Performed by: PSYCHIATRY & NEUROLOGY

## 2025-02-07 PROCEDURE — 3075F SYST BP GE 130 - 139MM HG: CPT | Mod: CPTII,S$GLB,, | Performed by: PSYCHIATRY & NEUROLOGY

## 2025-02-07 PROCEDURE — 3008F BODY MASS INDEX DOCD: CPT | Mod: CPTII,S$GLB,, | Performed by: PSYCHIATRY & NEUROLOGY

## 2025-02-07 RX ORDER — LEVETIRACETAM 750 MG/1
750 TABLET ORAL 2 TIMES DAILY
Qty: 180 TABLET | Refills: 3 | Status: SHIPPED | OUTPATIENT
Start: 2025-02-07 | End: 2025-02-17 | Stop reason: SDUPTHER

## 2025-02-07 NOTE — PROGRESS NOTES
Subjective:       Patient ID: Дмитрий Winn is a 39 y.o. male.    Chief Complaint: brain tumor    Oncologic History  8/2018: had a first time seizure at his parent's house and was found to have a right frontal mass. 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  1/29/2022: surveillance imaging revealed growth of lesion  2/17/2022: repeat subtotal resection by Chago Van; pathology consistent with recurrent astrocytoma, IDH-mutant, WHO grade 2  5/9-6/20/2022: chemoradiation to 54Gy over 30 fractions  7/2022: C1 TMZ @150mg/m2  8/2022: C2 TMZ @150mg/m2 (dose inadvertently not increased)  10/23/2022: C3 TMZ @200mg/m2 (delayed due to covid exposure)  11/2022: C4 TMZ @200mg/m2  12/2022: C5 TMZ@200mg/m2  2/2023: C6 TMZ @200mg/m2  3/2023: C7 TMZ @200mg/m2  4/2023: C8 TMZ @200mg/m2  5/2023: C9 TMZ @200mg/m2  6/2023: C10 TMZ @200/m2  7/2023: C11 TMZ @200/m2  8/2023: C12 TMZ @200/m2    Follow-up  Pertinent negatives include no fatigue, nausea, numbness or weakness.     Дмитрий is doing well. He has not had any seizures and is tolerating his AED without issue. No new symptoms. Continues to work.     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 180 tablet 3     No current facility-administered medications for this visit.      Past Surgical History:   Procedure Laterality Date    CRANIOTOMY FOR EXCISION OF INTRACRANIAL TUMOR Right 8/10/2018    Procedure: CRANIOTOMY, FOR INTRACRANIAL NEOPLASM EXCISION Right eyebrow incision / LAY[facility] craniotomy for tumor resection;  Surgeon: Lefty Paulson MD;  Location: [facility] OR;  Service: Neurosurgery;  Laterality: Right;    CRANIOTOMY USING FRAMELESS STEREOTAXY Right  2022    Procedure: CRANIOTOMY, USING FRAMELESS STEREOTAXY;  Surgeon: Chago Van MD;  Location: [facility] OR [address] [place] [address];  Service: Neurosurgery;  Laterality: Right;    cyst removed      SURGICAL REMOVAL OF PILONIDAL CYST        Family History   Problem Relation Name Age of Onset    Hypertension Mother      Melanoma Father      No Known Problems Sister      No Known Problems Brother      Cancer Maternal Grandfather      Prostate cancer Maternal Grandfather      Cancer Paternal Grandmother      Breast cancer Paternal Grandmother        Social History     Tobacco Use    Smoking status: Former     Current packs/day: 0.00     Average packs/day: 1 pack/day for 8.6 years (8.6 ttl pk-yrs)     Types: Cigarettes     Start date:      Quit date: 2018     Years since quittin.4    Smokeless tobacco: Never   Substance Use Topics    Alcohol use: Yes     Alcohol/week: 42.0 standard drinks of alcohol     Types: 42 Cans of beer per week     Comment: 6 pack daily     Drug use: No      Review of Systems   Constitutional:  Negative for fatigue.   HENT:  Negative for voice change.    Respiratory:  Negative for shortness of breath.    Cardiovascular:  Negative for leg swelling.   Gastrointestinal:  Negative for nausea.   Musculoskeletal:  Negative for gait problem.   Neurological:  Negative for tremors, seizures, weakness, numbness and coordination difficulties.   Psychiatric/Behavioral:  Negative for behavioral problems.      KPS: 100      Objective:      Vitals:    25 1059   BP: (!) 139/94   Pulse: 64           NEUROLOGICAL EXAMINATION:     MENTAL STATUS   Oriented to person, place, and time.   Follows 3 step commands.   Attention: normal. Concentration: normal.   Speech: speech is normal   Level of consciousness: alert  Knowledge: good.     CRANIAL NERVES   Cranial nerves II through XII intact.     CN III, IV, VI   Pupils are equal, round, and reactive to light.  Extraocular motions are normal.     MOTOR EXAM    Muscle bulk: normal  Overall muscle tone: normal    Strength   Strength 5/5 throughout.     REFLEXES     Reflexes   Right biceps: 2+  Left biceps: 2+  Right triceps: 2+  Left triceps: 2+  Right patellar: 2+  Left patellar: 2+  Right achilles: 2+  Left achilles: 2+       (Prior)     SENSORY EXAM   Light touch normal.     GAIT AND COORDINATION     Gait  Gait: normal    Tremor   Resting tremor: absent  Intention tremor: absent  Action tremor: absent       Imaging:  MRI brain from 1/28/2025 was personally visualized and compared to prior. This is a stable study.     Assessment:       Problem List Items Addressed This Visit          Oncology    Astrocytoma brain tumor - Primary    Relevant Orders    MRI Brain (Tumor with Perfusion) W W/O Contrast (XPD)     Other Visit Diagnoses       Localization-related (focal) (partial) symptomatic epilepsy and epileptic syndromes with complex partial seizures, not intractable, without status epilepticus        Relevant Medications    levETIRAcetam (KEPPRA) 750 MG Tab                  Plan:       Дмитрий Winn is a 39 y.o. male with a grade 2 astrocytoma, IDH-mutant s/p subtotal resection 8/2018, re-resection 2/2022, chemoradiation completed 6/2022, and adjuvant TMZ completed 8/2023 presenting for follow up. Tumor diagnosis was heralded by seizures. Currently Дмитрий is asymptomatic.    Astrocytoma, IDH-mutant, WHO grade 2  MRI brain from 1/28/2025 is stable. Follow up MRI brain in 4 months.     Seizures  Has had 2 seizures- one in 2018 heralding tumor diagnosis and one when he was taken off levetiracetam. No seizures on current AED dose. Continue levetiracetam 750mg bid.           Carleen Stoddard MD  Department of Neurology  Neuro-oncology Section

## 2025-02-17 DIAGNOSIS — G40.209 LOCALIZATION-RELATED (FOCAL) (PARTIAL) SYMPTOMATIC EPILEPSY AND EPILEPTIC SYNDROMES WITH COMPLEX PARTIAL SEIZURES, NOT INTRACTABLE, WITHOUT STATUS EPILEPTICUS: ICD-10-CM

## 2025-02-17 RX ORDER — LEVETIRACETAM 750 MG/1
750 TABLET ORAL 2 TIMES DAILY
Qty: 180 TABLET | Refills: 3 | Status: SHIPPED | OUTPATIENT
Start: 2025-02-17

## 2025-03-19 ENCOUNTER — TELEPHONE (OUTPATIENT)
Facility: CLINIC | Age: 40
End: 2025-03-19
Payer: COMMERCIAL

## 2025-03-19 ENCOUNTER — PATIENT MESSAGE (OUTPATIENT)
Facility: CLINIC | Age: 40
End: 2025-03-19
Payer: COMMERCIAL

## 2025-03-19 NOTE — TELEPHONE ENCOUNTER
Called patient and patient's father to reschedule MRI and follow up appointment. Unable to leave message and patient's father number is no longer in service. Rescheduled patient's appointment to 5/6/25 and sent a pt advise message to patient with details of his new appointments.

## 2025-03-25 ENCOUNTER — E-VISIT (OUTPATIENT)
Dept: FAMILY MEDICINE | Facility: CLINIC | Age: 40
End: 2025-03-25
Payer: COMMERCIAL

## 2025-03-25 DIAGNOSIS — M10.9 GOUT, UNSPECIFIED CAUSE, UNSPECIFIED CHRONICITY, UNSPECIFIED SITE: Primary | ICD-10-CM

## 2025-03-25 RX ORDER — COLCHICINE 0.6 MG/1
TABLET ORAL
Qty: 10 TABLET | Refills: 1 | Status: SHIPPED | OUTPATIENT
Start: 2025-03-25

## 2025-03-25 RX ORDER — METHYLPREDNISOLONE 4 MG/1
TABLET ORAL
Qty: 21 TABLET | Refills: 0 | Status: SHIPPED | OUTPATIENT
Start: 2025-03-25

## 2025-03-25 RX ORDER — INDOMETHACIN 75 MG/1
75 CAPSULE, EXTENDED RELEASE ORAL 2 TIMES DAILY PRN
Qty: 30 CAPSULE | Refills: 0 | Status: SHIPPED | OUTPATIENT
Start: 2025-03-25 | End: 2025-04-24

## 2025-03-25 NOTE — PROGRESS NOTES
Patient ID: Дмитрий Winn is a 39 y.o. male.    Chief Complaint: General Illness (Entered automatically based on patient selection in [employer].)          274}  The patient initiated a request through [employer] on 3/25/2025 for evaluation and management with a chief complaint of General Illness (Entered automatically based on patient selection in [employer].)     I evaluated the questionnaire submission on 03/25/2025 .    Total Time (in minutes): 13     [facility] Peq Evisit [facility]-Medication    3/25/2025  8:24 AM CDT - Filed by Patient   What do you need help with? Medication Request   Do you agree to participate in an E-Visit? Yes   If you have any of the following symptoms, please present to your local emergency room or call 911:  I acknowledge   Medication requests for narcotics will not be addressed via an E-Visit.  Please schedule an appointment. I acknowledge   Do you want to address a new or existing medication? I would like to start a new medication that I do not already take   What is the main issue you would like addressed today? Gout   What is the name of the medication that you would like to start? Indomethacin   Have you taken a similar medication in the past? Yes   What was the name of the similar medication? Indomethacin   Why are you no longer on that medication? No specific reason    What medical condition is the  medication intended to treat? Inflamation   Provide any additional information you feel is important.    Please attach any relevant images or files    Are you able to take your vital signs? 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  5/9/22-6/20/22 completed chemoradiation with temozolomide  7/19/22 started maintenance temozolomide      Right frontal lobe mass 08/08/2018      Recent Labs Obtained:  Lab Results   Component Value Date    WBC 3.36 (L) 09/01/2023    HGB 13.9 (L) 09/01/2023    HCT 39.1 (L) 09/01/2023    MCV 96 09/01/2023     09/01/2023     09/01/2023    K 3.9 09/01/2023    GLU 91 09/01/2023    CREATININE 0.8 09/01/2023    EGFRNORACEVR >60.0 09/01/2023      Review of patient's allergies indicates:  No Known Allergies    Encounter Diagnosis   Name Primary?    Gout, unspecified cause, unspecified chronicity, unspecified site Yes        No orders of the defined types were placed in this encounter.     Medications Ordered This Encounter   Medications    colchicine (COLCRYS) 0.6 mg tablet     Sig: Take 2 at onset of gout and repeat dose in 1 hr. May start 1 pill per day after 24 hours.     Dispense:  10 tablet     Refill:  1    indomethacin (INDOCIN SR) 75 mg CpSR CR capsule     Sig: Take 1 capsule (75 mg total) by mouth 2 (two) times daily as needed (pain).     Dispense:  30 capsule     Refill:  0    methylPREDNISolone (MEDROL DOSEPACK) 4 mg tablet     Sig: follow package directions     Dispense:  21 tablet     Refill:  0        E-Visit Time Tracking:    Day 1 Time (in minutes): 13    Total Time (in minutes): 13      274}

## 2025-04-13 ENCOUNTER — E-VISIT (OUTPATIENT)
Dept: FAMILY MEDICINE | Facility: CLINIC | Age: 40
End: 2025-04-13
Payer: COMMERCIAL

## 2025-04-13 DIAGNOSIS — M10.9 GOUT, UNSPECIFIED CAUSE, UNSPECIFIED CHRONICITY, UNSPECIFIED SITE: ICD-10-CM

## 2025-04-13 RX ORDER — INDOMETHACIN 75 MG/1
75 CAPSULE, EXTENDED RELEASE ORAL 2 TIMES DAILY PRN
Qty: 30 CAPSULE | Refills: 0 | OUTPATIENT
Start: 2025-04-13 | End: 2025-05-13

## 2025-04-14 RX ORDER — INDOMETHACIN 75 MG/1
75 CAPSULE, EXTENDED RELEASE ORAL 2 TIMES DAILY PRN
Qty: 30 CAPSULE | Refills: 0 | Status: SHIPPED | OUTPATIENT
Start: 2025-04-14 | End: 2025-05-14

## 2025-04-14 NOTE — PROGRESS NOTES
Patient ID: Дмитрий Winn is a 39 y.o. male.    Chief Complaint: Medication Management (Entered automatically based on patient selection in [employer].)          274}  The patient initiated a request through [employer] on 4/13/2025 for evaluation and management with a chief complaint of Medication Management (Entered automatically based on patient selection in [employer].)     I evaluated the questionnaire submission on 04/14/2025 .    Total Time (in minutes): 7     [facility] Peq Evisit Medication    4/13/2025  9:17 PM CDT - Filed by Patient   Do you agree to participate in an E-Visit? Yes   If you have any of the following symptoms, please present to your local emergency room or call 911:  I acknowledge   Medication requests for narcotics will not be addressed via an E-Visit.  Please schedule an appointment. I acknowledge   Choose the state of your primary residence [place]   Do you want to address a new or existing medication? I would like to address a medication I currently take   What is the main issue you would like addressed today? Possible gout   Would you like to change or continue your medication? Continue medication   What medication would you like to continue?  Indomethacin   Are you taking it as prescribed? Yes    What medical condition is the  medication intended to treat? Gout   Is the medication helping your condition? Yes   Are you having any side effects from the medication? No   Provide any additional information you feel is important.    Please attach any relevant images or files    Are you able to take your vital signs? 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  5/9/22-6/20/22 completed chemoradiation with temozolomide  7/19/22 started maintenance temozolomide      Right frontal lobe mass 08/08/2018      Recent Labs Obtained:  Lab Results   Component Value Date    WBC 3.36 (L) 09/01/2023    HGB 13.9 (L) 09/01/2023    HCT 39.1 (L) 09/01/2023    MCV 96 09/01/2023     09/01/2023     09/01/2023    K 3.9 09/01/2023    GLU 91 09/01/2023    CREATININE 0.8 09/01/2023    EGFRNORACEVR >60.0 09/01/2023      Review of patient's allergies indicates:  No Known Allergies    Encounter Diagnosis   Name Primary?    Gout, unspecified cause, unspecified chronicity, unspecified site         Orders Placed This Encounter   Procedures    Uric Acid     Standing Status:   Future     Expected Date:   4/14/2025     Expiration Date:   4/15/2026     Send normal result to authorizing provider's In Basket if patient is active on MyChart::   Yes      Medications Ordered This Encounter   Medications    indomethacin (INDOCIN SR) 75 mg CpSR CR capsule     Sig: Take 1 capsule (75 mg total) by mouth 2 (two) times daily as needed (pain).     Dispense:  30 capsule     Refill:  0        E-Visit Time Tracking:    Day 1 Time (in minutes): 7    Total Time (in minutes): 7      274}

## 2025-05-12 ENCOUNTER — HOSPITAL ENCOUNTER (OUTPATIENT)
Dept: RADIOLOGY | Facility: HOSPITAL | Age: 40
Discharge: HOME | End: 2025-05-12
Attending: PSYCHIATRY & NEUROLOGY
Payer: COMMERCIAL

## 2025-05-12 DIAGNOSIS — C71.9 ASTROCYTOMA BRAIN TUMOR: ICD-10-CM

## 2025-05-12 PROCEDURE — 70553 MRI BRAIN STEM W/O & W/DYE: CPT | Mod: TC

## 2025-05-12 PROCEDURE — 25500020 PHARM REV CODE 255: Performed by: PSYCHIATRY & NEUROLOGY

## 2025-05-12 PROCEDURE — 70553 MRI BRAIN STEM W/O & W/DYE: CPT | Mod: 26,,, | Performed by: STUDENT IN AN ORGANIZED HEALTH CARE EDUCATION/TRAINING PROGRAM

## 2025-05-12 PROCEDURE — A9585 GADOBUTROL INJECTION: HCPCS | Performed by: PSYCHIATRY & NEUROLOGY

## 2025-05-12 RX ORDER — GADOBUTROL 604.72 MG/ML
8 INJECTION INTRAVENOUS
Status: COMPLETED | OUTPATIENT
Start: 2025-05-12 | End: 2025-05-12

## 2025-05-12 RX ADMIN — GADOBUTROL 8 ML: 604.72 INJECTION INTRAVENOUS at 07:05

## 2025-05-30 ENCOUNTER — OFFICE VISIT (OUTPATIENT)
Dept: NEUROSURGERY | Facility: CLINIC | Age: 40
End: 2025-05-30
Payer: COMMERCIAL

## 2025-05-30 VITALS
HEART RATE: 63 BPM | SYSTOLIC BLOOD PRESSURE: 127 MMHG | WEIGHT: 160.06 LBS | HEIGHT: 66 IN | BODY MASS INDEX: 25.72 KG/M2 | DIASTOLIC BLOOD PRESSURE: 86 MMHG

## 2025-05-30 DIAGNOSIS — R56.9 SEIZURE: ICD-10-CM

## 2025-05-30 DIAGNOSIS — C71.9 ASTROCYTOMA BRAIN TUMOR: Primary | ICD-10-CM

## 2025-05-30 DIAGNOSIS — M10.9 GOUT, UNSPECIFIED CAUSE, UNSPECIFIED CHRONICITY, UNSPECIFIED SITE: ICD-10-CM

## 2025-05-30 PROCEDURE — 99999 PR PBB SHADOW E&M-EST. PATIENT-LVL III: CPT | Mod: PBBFAC,,, | Performed by: PSYCHIATRY & NEUROLOGY

## 2025-05-30 NOTE — PROGRESS NOTES
Subjective:       Patient ID: Дмитрий Winn is a 39 y.o. male.    Chief Complaint: brain tumor    Oncologic History  8/2018: had a first time seizure at his parent's house and was found to have a right frontal mass. Subtotal resection performed at [facility] revealed astrocytoma, IDH-mutant, WHO grade 2.  1/29/2022: surveillance imaging revealed growth of lesion  2/17/2022: repeat subtotal resection by Chago Van; pathology consistent with recurrent astrocytoma, IDH-mutant, WHO grade 2  5/9-6/20/2022: chemoradiation to 54Gy over 30 fractions  7/2022: C1 TMZ @150mg/m2  8/2022: C2 TMZ @150mg/m2 (dose inadvertently not increased)  10/23/2022: C3 TMZ @200mg/m2 (delayed due to covid exposure)  11/2022: C4 TMZ @200mg/m2  12/2022: C5 TMZ@200mg/m2  2/2023: C6 TMZ @200mg/m2  3/2023: C7 TMZ @200mg/m2  4/2023: C8 TMZ @200mg/m2  5/2023: C9 TMZ @200mg/m2  6/2023: C10 TMZ @200/m2  7/2023: C11 TMZ @200/m2  8/2023: C12 TMZ @200/m2    Follow-up  Pertinent negatives include no fatigue, nausea, numbness or weakness.     Дмитрий is doing well. He has not had any seizures and is tolerating his AED without issue. No new symptoms. Continues to work. He has had intermittent gout flares and wonders if he should be on something preventative.    Past Medical History:   Diagnosis Date    Ataxia 5/29/2020    Cancer     brain tumor    Epilepsy     Normocytic anemia 3/8/2022      Current Outpatient Medications   Medication Sig Dispense Refill    colchicine (COLCRYS) 0.6 mg tablet Take 2 at onset of gout and repeat dose in 1 hr. May start 1 pill per day after 24 hours. 10 tablet 1    levETIRAcetam (KEPPRA) 750 MG Tab Take 1 tablet (750 mg total) by mouth 2 (two) times daily. 180 tablet 3    methylPREDNISolone (MEDROL DOSEPACK) 4 mg tablet follow package directions 21 tablet 0     No current facility-administered medications for this visit.      Past Surgical History:   Procedure Laterality Date    CRANIOTOMY FOR EXCISION  OF INTRACRANIAL TUMOR Right 8/10/2018    Procedure: CRANIOTOMY, FOR INTRACRANIAL NEOPLASM EXCISION Right eyebrow incision / [facility] craniotomy for tumor resection;  Surgeon: Lefty Paulson MD;  Location: [facility] OR;  Service: Neurosurgery;  Laterality: Right;    CRANIOTOMY USING FRAMELESS STEREOTAXY Right 2022    Procedure: CRANIOTOMY, USING FRAMELESS STEREOTAXY;  Surgeon: Chago Van MD;  [facility] OR 2ND FLR;  Service: Neurosurgery;  Laterality: Right;    cyst removed      SURGICAL REMOVAL OF PILONIDAL CYST        Family History   Problem Relation Name Age of Onset    Hypertension Mother      Melanoma Father      No Known Problems Sister      No Known Problems Brother      Cancer Maternal Grandfather      Prostate cancer Maternal Grandfather      Cancer Paternal Grandmother      Breast cancer Paternal Grandmother        Social History     Tobacco Use    Smoking status: Former     Current packs/day: 0.00     Average packs/day: 1 pack/day for 8.6 years (8.6 ttl pk-yrs)     Types: Cigarettes     Start date:      Quit date: 2018     Years since quittin.7    Smokeless tobacco: Never   Substance Use Topics    Alcohol use: Yes     Alcohol/week: 42.0 standard drinks of alcohol     Types: 42 Cans of beer per week     Comment: 6 pack daily     Drug use: No      Review of Systems   Constitutional:  Negative for fatigue.   HENT:  Negative for voice change.    Respiratory:  Negative for shortness of breath.    Cardiovascular:  Negative for leg swelling.   Gastrointestinal:  Negative for nausea.   Musculoskeletal:  Negative for gait problem.   Neurological:  Negative for tremors, seizures, weakness, numbness and coordination difficulties.   Psychiatric/Behavioral:  Negative for behavioral problems.      KPS: 100      Objective:      Vitals:    25 0818   BP: 127/86   Pulse: 63           NEUROLOGICAL EXAMINATION:     MENTAL STATUS   Oriented to person, place, and time.   Follows 3 step  commands.   Attention: normal. Concentration: normal.   Speech: speech is normal   Level of consciousness: alert  Knowledge: good.     CRANIAL NERVES   Cranial nerves II through XII intact.     CN III, IV, VI   Pupils are equal, round, and reactive to light.  Extraocular motions are normal.     MOTOR EXAM   Muscle bulk: normal  Overall muscle tone: normal    Strength   Strength 5/5 throughout.     REFLEXES     Reflexes   Right biceps: 2+  Left biceps: 2+  Right triceps: 2+  Left triceps: 2+  Right patellar: 2+  Left patellar: 2+  Right achilles: 2+  Left achilles: 2+       (Prior)     SENSORY EXAM   Light touch normal.     GAIT AND COORDINATION     Gait  Gait: normal    Tremor   Resting tremor: absent  Intention tremor: absent  Action tremor: absent       Imaging:  MRI brain from 5/12/2025 was personally visualized and compared to prior. This is a stable study.     Assessment:       Problem List Items Addressed This Visit          Neuro    Seizure       Oncology    Astrocytoma brain tumor - Primary    Relevant Orders    MRI Brain (Tumor with Perfusion) W W/O Contrast (XPD)     Other Visit Diagnoses         Gout, unspecified cause, unspecified chronicity, unspecified site                    Plan:       Дмитрий Winn is a 39 y.o. male with a grade 2 astrocytoma, IDH-mutant s/p subtotal resection 8/2018, re-resection 2/2022, chemoradiation completed 6/2022, and adjuvant TMZ completed 8/2023 presenting for follow up. Tumor diagnosis was heralded by seizures. Currently Дмитрий is asymptomatic.    Astrocytoma, IDH-mutant, WHO grade 2  MRI brain from 5/12/2025 is stable. Follow up MRI brain in 4 months.     Seizures  Has had 2 seizures- one in 2018 heralding tumor diagnosis and one when he was taken off levetiracetam. No seizures on current AED dose. Continue levetiracetam 750mg bid.     Gout  Not currently active. Provided number to [facility] to establish care with a PCP.          Carleen ORTEGA  MD Rich[facility]  Department of Neurology  Neuro-oncology Section

## 2025-08-07 ENCOUNTER — E-VISIT (OUTPATIENT)
Dept: FAMILY MEDICINE | Facility: CLINIC | Age: 40
End: 2025-08-07
Payer: COMMERCIAL

## 2025-08-07 DIAGNOSIS — M10.9 GOUT, UNSPECIFIED CAUSE, UNSPECIFIED CHRONICITY, UNSPECIFIED SITE: ICD-10-CM

## 2025-08-07 RX ORDER — COLCHICINE 0.6 MG/1
TABLET ORAL
Qty: 10 TABLET | Refills: 1 | Status: SHIPPED | OUTPATIENT
Start: 2025-08-07

## 2025-08-07 RX ORDER — METHYLPREDNISOLONE 4 MG/1
TABLET ORAL
Qty: 21 TABLET | Refills: 0 | OUTPATIENT
Start: 2025-08-07

## 2025-08-07 RX ORDER — METHYLPREDNISOLONE 4 MG/1
TABLET ORAL
Qty: 21 TABLET | Refills: 0 | Status: SHIPPED | OUTPATIENT
Start: 2025-08-07

## 2025-08-07 RX ORDER — INDOMETHACIN 75 MG/1
75 CAPSULE, EXTENDED RELEASE ORAL 2 TIMES DAILY PRN
Qty: 60 CAPSULE | Refills: 0 | Status: SHIPPED | OUTPATIENT
Start: 2025-08-07

## 2025-08-07 NOTE — PROGRESS NOTES
Patient ID: Дмитрий Winn is a 40 y.o. male.        E-Visit Time Tracking:   Day 1 Time (in minutes): 12  Total Time (in minutes): 12      Chief Complaint: Medication Management (Entered automatically based on patient selection in [employer].)      The patient initiated a request through [employer] on 8/7/2025 for evaluation and management with a chief complaint of Medication Management (Entered automatically based on patient selection in [employer].)     I evaluated the questionnaire submission on 08/07/2025.    [facility] Peq Evisit Medication    8/7/2025  9:25 AM CDT - Filed by Patient   Do you agree to participate in an E-Visit? Yes   If you have any of the following symptoms, please present to your local emergency room or call 911:  I acknowledge   Medication requests for narcotics will not be addressed via an E-Visit.  Please schedule an appointment. I acknowledge   Choose the state of your primary residence [place]   Do you want to address a new or existing medication? (Start a new medication, Address a current medication) Start a new medication   What is the main issue you would like addressed today? Gout flare up   What is the name of the medication you would like to start? Indomethacin   Have you taken a similar medication in the past? Yes   What is the name of the similar medication you used in the past? Indomethacin   Why do you no longer use the similar medication? (Cost or insurance issues, Medication no longer available, Side effects, Medication not effective, None) None    What medical condition is the  medication intended to treat? Gout   Provide any information you feel is important to your history not asked above    Please attach any relevant images or files    Are you able to take your vitals? No         Encounter Diagnosis   Name Primary?    Gout, unspecified cause, unspecified chronicity, unspecified site         No orders of the defined types were placed in this encounter.     Medications  Ordered This Encounter   Medications    colchicine (COLCRYS) 0.6 mg tablet     Sig: Take 2 at onset of gout and repeat dose in 1 hr. May start 1 pill per day after 24 hours.     Dispense:  10 tablet     Refill:  1    indomethacin (INDOCIN SR) 75 mg CpSR CR capsule     Sig: Take 1 capsule (75 mg total) by mouth 2 (two) times daily as needed (pain).     Dispense:  60 capsule     Refill:  0    methylPREDNISolone (MEDROL DOSEPACK) 4 mg tablet     Sig: follow package directions     Dispense:  21 tablet     Refill:  0        No follow-ups on file.

## 2025-08-19 DIAGNOSIS — G40.209 LOCALIZATION-RELATED (FOCAL) (PARTIAL) SYMPTOMATIC EPILEPSY AND EPILEPTIC SYNDROMES WITH COMPLEX PARTIAL SEIZURES, NOT INTRACTABLE, WITHOUT STATUS EPILEPTICUS: ICD-10-CM

## 2025-08-22 RX ORDER — LEVETIRACETAM 750 MG/1
750 TABLET ORAL 2 TIMES DAILY
Qty: 180 TABLET | Refills: 3 | Status: SHIPPED | OUTPATIENT
Start: 2025-08-22

## 2025-09-04 DIAGNOSIS — M10.9 GOUT, UNSPECIFIED CAUSE, UNSPECIFIED CHRONICITY, UNSPECIFIED SITE: ICD-10-CM

## 2025-09-04 RX ORDER — INDOMETHACIN 75 MG/1
CAPSULE, EXTENDED RELEASE ORAL
Qty: 60 CAPSULE | Refills: 0 | OUTPATIENT
Start: 2025-09-04

## (undated) DEVICE — BUR BONE CUT MICRO TPS 3X3.8MM

## (undated) DEVICE — DURAPREP SURG SCRUB 26ML

## (undated) DEVICE — CORD BIPOLAR 12 FOOT

## (undated) DEVICE — FORCEP SPETZLER MALIS 8IN 1MM

## (undated) DEVICE — MARKER SKIN STND TIP BLUE BARR

## (undated) DEVICE — DRAPE OPMI STERILE

## (undated) DEVICE — CONTAINER SPECIMEN STRL 4OZ

## (undated) DEVICE — DRESSING SURGICAL 1/2X1/2

## (undated) DEVICE — SEE MEDLINE ITEM 146292

## (undated) DEVICE — PINS SKULL ADULT MAYFIELD
Type: IMPLANTABLE DEVICE | Site: CRANIAL | Status: NON-FUNCTIONAL
Removed: 2022-02-17

## (undated) DEVICE — DRAPE THYROID WITH ARMBOARD

## (undated) DEVICE — SUT 4/0 18IN NUROLON BLK B

## (undated) DEVICE — BLADE SURG CARBON STEEL SZ11

## (undated) DEVICE — BURR ROUTER TAPERED

## (undated) DEVICE — COTTON BALLS 1/2IN

## (undated) DEVICE — TUBE FRAZIER 5MM 2FT SOFT TIP

## (undated) DEVICE — DRAPE SURGICAL STERI IRRG PCH

## (undated) DEVICE — SEE MEDLINE ITEM 156905

## (undated) DEVICE — SEE MEDLINE ITEM 154981

## (undated) DEVICE — CARTRIDGE OIL

## (undated) DEVICE — TOWEL OR DISP STRL BLUE 4/PK

## (undated) DEVICE — RUBBERBAND STERILE 3X1/8IN

## (undated) DEVICE — DIFFUSER

## (undated) DEVICE — ELECTRODE REM PLYHSV RETURN 9

## (undated) DEVICE — DRESSING SURGICAL 1X3

## (undated) DEVICE — SUT CTD VICRYL BR CR/SH VIL

## (undated) DEVICE — ROUTER TAPERED 2.3MM

## (undated) DEVICE — SPONGE NEURO 1/4X1/4

## (undated) DEVICE — BURR ACORN 7.5MM

## (undated) DEVICE — HEMOSTAT SURGICEL 4X8IN

## (undated) DEVICE — HOOK STAY ELAS 5MM 8EA/PK

## (undated) DEVICE — MARKERS SPHERZ PASSIVE

## (undated) DEVICE — DRESSING SURGICAL 1X1

## (undated) DEVICE — TAPE SURG MEDIPORE 6X72IN